# Patient Record
Sex: FEMALE | Race: BLACK OR AFRICAN AMERICAN | NOT HISPANIC OR LATINO | Employment: OTHER | URBAN - METROPOLITAN AREA
[De-identification: names, ages, dates, MRNs, and addresses within clinical notes are randomized per-mention and may not be internally consistent; named-entity substitution may affect disease eponyms.]

---

## 2017-09-28 ENCOUNTER — HOSPITAL ENCOUNTER (EMERGENCY)
Facility: HOSPITAL | Age: 67
Discharge: HOME | End: 2017-09-28
Attending: EMERGENCY MEDICINE
Payer: MEDICARE

## 2017-09-28 VITALS
HEIGHT: 59 IN | RESPIRATION RATE: 12 BRPM | BODY MASS INDEX: 23.79 KG/M2 | SYSTOLIC BLOOD PRESSURE: 188 MMHG | WEIGHT: 118 LBS | DIASTOLIC BLOOD PRESSURE: 76 MMHG | TEMPERATURE: 98 F | OXYGEN SATURATION: 98 % | HEART RATE: 75 BPM

## 2017-09-28 DIAGNOSIS — Z99.2 ESRD (END STAGE RENAL DISEASE) ON DIALYSIS: Primary | ICD-10-CM

## 2017-09-28 DIAGNOSIS — N18.6 ESRD (END STAGE RENAL DISEASE) ON DIALYSIS: Primary | ICD-10-CM

## 2017-09-28 DIAGNOSIS — R07.9 CHEST PAIN: ICD-10-CM

## 2017-09-28 LAB
ALBUMIN SERPL BCP-MCNC: 3.1 G/DL
ALP SERPL-CCNC: 55 U/L
ALT SERPL W/O P-5'-P-CCNC: 5 U/L
ANION GAP SERPL CALC-SCNC: 17 MMOL/L
AST SERPL-CCNC: 10 U/L
BASOPHILS # BLD AUTO: 0.02 K/UL
BASOPHILS NFR BLD: 0.5 %
BILIRUB SERPL-MCNC: 0.9 MG/DL
BNP SERPL-MCNC: >4900 PG/ML
BUN SERPL-MCNC: 23 MG/DL
CALCIUM SERPL-MCNC: 9.6 MG/DL
CHLORIDE SERPL-SCNC: 99 MMOL/L
CK MB SERPL-MCNC: 1.2 NG/ML
CK MB SERPL-RTO: 5 %
CK SERPL-CCNC: 24 U/L
CO2 SERPL-SCNC: 23 MMOL/L
CREAT SERPL-MCNC: 7 MG/DL
DIFFERENTIAL METHOD: ABNORMAL
EOSINOPHIL # BLD AUTO: 0.1 K/UL
EOSINOPHIL NFR BLD: 2 %
ERYTHROCYTE [DISTWIDTH] IN BLOOD BY AUTOMATED COUNT: 16.8 %
EST. GFR  (AFRICAN AMERICAN): 6 ML/MIN/1.73 M^2
EST. GFR  (NON AFRICAN AMERICAN): 6 ML/MIN/1.73 M^2
GLUCOSE SERPL-MCNC: 72 MG/DL
HCT VFR BLD AUTO: 37.1 %
HGB BLD-MCNC: 11.9 G/DL
LYMPHOCYTES # BLD AUTO: 1.6 K/UL
LYMPHOCYTES NFR BLD: 39.3 %
MCH RBC QN AUTO: 27.2 PG
MCHC RBC AUTO-ENTMCNC: 32.1 G/DL
MCV RBC AUTO: 85 FL
MONOCYTES # BLD AUTO: 0.5 K/UL
MONOCYTES NFR BLD: 12.8 %
NEUTROPHILS # BLD AUTO: 1.8 K/UL
NEUTROPHILS NFR BLD: 45.9 %
PLATELET # BLD AUTO: 116 K/UL
PLATELET BLD QL SMEAR: ABNORMAL
PMV BLD AUTO: 10.2 FL
POTASSIUM SERPL-SCNC: 3.7 MMOL/L
PROT SERPL-MCNC: 7.3 G/DL
RBC # BLD AUTO: 4.38 M/UL
SODIUM SERPL-SCNC: 139 MMOL/L
TROPONIN I SERPL DL<=0.01 NG/ML-MCNC: 0.08 NG/ML
WBC # BLD AUTO: 3.99 K/UL

## 2017-09-28 PROCEDURE — 84484 ASSAY OF TROPONIN QUANT: CPT

## 2017-09-28 PROCEDURE — 99284 EMERGENCY DEPT VISIT MOD MDM: CPT | Mod: 25

## 2017-09-28 PROCEDURE — 93005 ELECTROCARDIOGRAM TRACING: CPT

## 2017-09-28 PROCEDURE — 83880 ASSAY OF NATRIURETIC PEPTIDE: CPT

## 2017-09-28 PROCEDURE — 82553 CREATINE MB FRACTION: CPT

## 2017-09-28 PROCEDURE — 85025 COMPLETE CBC W/AUTO DIFF WBC: CPT

## 2017-09-28 PROCEDURE — 80053 COMPREHEN METABOLIC PANEL: CPT

## 2017-09-28 PROCEDURE — 93010 ELECTROCARDIOGRAM REPORT: CPT | Mod: ,,, | Performed by: INTERNAL MEDICINE

## 2017-09-28 RX ORDER — BUTALBITAL, ACETAMINOPHEN AND CAFFEINE 50; 325; 40 MG/1; MG/1; MG/1
1 TABLET ORAL
Status: DISCONTINUED | OUTPATIENT
Start: 2017-09-28 | End: 2017-09-28

## 2017-09-28 RX ORDER — OMEPRAZOLE 20 MG/1
20 CAPSULE, DELAYED RELEASE ORAL DAILY
Qty: 30 CAPSULE | Refills: 11 | Status: SHIPPED | OUTPATIENT
Start: 2017-09-28 | End: 2018-06-04

## 2017-09-28 RX ORDER — PROMETHAZINE HYDROCHLORIDE 25 MG/1
25 TABLET ORAL EVERY 6 HOURS PRN
Qty: 15 TABLET | Refills: 0 | Status: ON HOLD | OUTPATIENT
Start: 2017-09-28 | End: 2018-07-18 | Stop reason: HOSPADM

## 2017-09-28 NOTE — ED PROVIDER NOTES
SCRIBE #1 NOTE: I, Yisel Blackman, am scribing for, and in the presence of, Dieudonne Cardenas MD. I have scribed the entire note.      History      Chief Complaint   Patient presents with    Chest Pain     reports pain for a week, radiates to L shoulder/neck. Reports mild SOB       Review of patient's allergies indicates:   Allergen Reactions    Tylenol [acetaminophen] Other (See Comments)     Headache        HPI   HPI    2017, 10:35 AM   History obtained from the patient      History of Present Illness: Talia Mcfarland is a 67 y.o. female patient who presents to the Emergency Department for acute substernal CP which onset gradually about 1 week ago. Symptoms are constant and moderate in severity.  No mitigating or exacerbating factors reported. Associated sxs include pain that radiates to the L shoulder and neck, SOB, and mild n/v. Patient denies any fever, chills, diaphoresis, palpitations, extremity weakness/numbness, leg pain/swelling, dizziness, cough, and all other sxs at this time. No prior Tx reported. No further complaints or concerns at this time.         Arrival mode: Personal vehicle      PCP: Tank Castro MD       Past Medical History:  Past Medical History:   Diagnosis Date    Anemia     CHF (congestive heart failure)     Diabetes mellitus     DVT (deep venous thrombosis)     Left leg    ESRD on hemodialysis 2 years     Gastroparesis     Hx of diabetic gastroparesis 3/18/2015    Hypertension     Type II or unspecified type diabetes mellitus with renal manifestations, not stated as uncontrolled 30 years     Urinary tract infection due to extended-spectrum beta lactamase (ESBL)-producing Klebsiella        Past Surgical History:  Past Surgical History:   Procedure Laterality Date     SECTION      CHOLECYSTECTOMY      KUSHAL Bilateral     LBP severe with multiple KUSHAL     ROTATOR CUFF REPAIR      UMBILICAL HERNIA REPAIR      VEIN BYPASS SURGERY Left          Family  History:  Family History   Problem Relation Age of Onset    Kidney disease Neg Hx        Social History:  Social History     Social History Main Topics    Smoking status: Never Smoker    Smokeless tobacco: Never Used    Alcohol use No    Drug use: No    Sexual activity: Not on file       ROS   Review of Systems   Constitutional: Negative for chills, diaphoresis and fever.   HENT: Negative for sore throat.    Respiratory: Positive for shortness of breath. Negative for cough.    Cardiovascular: Positive for chest pain. Negative for palpitations and leg swelling.   Gastrointestinal: Positive for nausea and vomiting.   Genitourinary: Negative for dysuria.   Musculoskeletal: Negative for back pain.        - leg pain    Skin: Negative for rash.   Neurological: Negative for dizziness, weakness and numbness.   Hematological: Does not bruise/bleed easily.       Physical Exam      Initial Vitals [09/28/17 1034]   BP Pulse Resp Temp SpO2   (!) 194/78 78 20 97.9 °F (36.6 °C) 100 %      MAP       116.67          Physical Exam  Nursing Notes and Vital Signs Reviewed.  Constitutional: Patient is in no apparent distress. Well-developed and well-nourished.  Head: Atraumatic. Normocephalic.  Eyes: PERRL. EOM intact. Conjunctivae are not pale. No scleral icterus.  ENT: Mucous membranes are moist. Oropharynx is clear and symmetri.    Neck: Supple. Full ROM. No lymphadenopathy.  Cardiovascular: Regular rate. Regular rhythm. No murmurs, rubs, or gallops. Distal pulses are 2+ and symmetric.  Pulmonary/Chest: No respiratory distress. Clear to auscultation bilaterally. No wheezing, rales, or rhonchi.  Abdominal: Soft and non-distended.  There is no tenderness.  No rebound, guarding, or rigidity. Good bowel sounds.  Genitourinary: No CVA tenderness  Musculoskeletal: Moves all extremities. No obvious deformities. No edema. No calf tenderness.  Skin: Warm and dry.  Neurological:  Alert, awake, and appropriate.  Normal speech.  No acute  "focal neurological deficits are appreciated.  Psychiatric: Normal affect. Good eye contact. Appropriate in content.    ED Course    Procedures  ED Vital Signs:  Vitals:    09/28/17 1034 09/28/17 1041 09/28/17 1132 09/28/17 1302   BP: (!) 194/78  (!) 196/76 (!) 188/76   Pulse: 78 77 74 75   Resp: 20  (!) 23 12   Temp: 97.9 °F (36.6 °C)      TempSrc: Oral      SpO2: 100%  97% 98%   Weight: 53.5 kg (118 lb)      Height: 4' 11" (1.499 m)          Abnormal Lab Results:  Labs Reviewed   CBC W/ AUTO DIFFERENTIAL - Abnormal; Notable for the following:        Result Value    Hemoglobin 11.9 (*)     RDW 16.8 (*)     Platelets 116 (*)     Platelet Estimate Decreased (*)     All other components within normal limits   B-TYPE NATRIURETIC PEPTIDE - Abnormal; Notable for the following:     BNP >4,900 (*)     All other components within normal limits   TROPONIN I - Abnormal; Notable for the following:     Troponin I 0.076 (*)     All other components within normal limits   COMPREHENSIVE METABOLIC PANEL - Abnormal; Notable for the following:     Creatinine 7.0 (*)     Albumin 3.1 (*)     ALT 5 (*)     Anion Gap 17 (*)     eGFR if  6 (*)     eGFR if non  6 (*)     All other components within normal limits   CK-MB   URINALYSIS        All Lab Results:  Results for orders placed or performed during the hospital encounter of 09/28/17   CBC auto differential   Result Value Ref Range    WBC 3.99 3.90 - 12.70 K/uL    RBC 4.38 4.00 - 5.40 M/uL    Hemoglobin 11.9 (L) 12.0 - 16.0 g/dL    Hematocrit 37.1 37.0 - 48.5 %    MCV 85 82 - 98 fL    MCH 27.2 27.0 - 31.0 pg    MCHC 32.1 32.0 - 36.0 g/dL    RDW 16.8 (H) 11.5 - 14.5 %    Platelets 116 (L) 150 - 350 K/uL    MPV 10.2 9.2 - 12.9 fL    Gran # 1.8 1.8 - 7.7 K/uL    Lymph # 1.6 1.0 - 4.8 K/uL    Mono # 0.5 0.3 - 1.0 K/uL    Eos # 0.1 0.0 - 0.5 K/uL    Baso # 0.02 0.00 - 0.20 K/uL    Gran% 45.9 38.0 - 73.0 %    Lymph% 39.3 18.0 - 48.0 %    Mono% 12.8 4.0 - 15.0 % "    Eosinophil% 2.0 0.0 - 8.0 %    Basophil% 0.5 0.0 - 1.9 %    Platelet Estimate Decreased (A)     Differential Method Automated    Brain natriuretic peptide   Result Value Ref Range    BNP >4,900 (H) 0 - 99 pg/mL   CK-MB   Result Value Ref Range    CPK 24 20 - 180 U/L    CPK MB 1.2 0.1 - 6.5 ng/mL    MB% 5.0 0.0 - 5.0 %   Troponin I   Result Value Ref Range    Troponin I 0.076 (H) 0.000 - 0.026 ng/mL   Comprehensive metabolic panel   Result Value Ref Range    Sodium 139 136 - 145 mmol/L    Potassium 3.7 3.5 - 5.1 mmol/L    Chloride 99 95 - 110 mmol/L    CO2 23 23 - 29 mmol/L    Glucose 72 70 - 110 mg/dL    BUN, Bld 23 8 - 23 mg/dL    Creatinine 7.0 (H) 0.5 - 1.4 mg/dL    Calcium 9.6 8.7 - 10.5 mg/dL    Total Protein 7.3 6.0 - 8.4 g/dL    Albumin 3.1 (L) 3.5 - 5.2 g/dL    Total Bilirubin 0.9 0.1 - 1.0 mg/dL    Alkaline Phosphatase 55 55 - 135 U/L    AST 10 10 - 40 U/L    ALT 5 (L) 10 - 44 U/L    Anion Gap 17 (H) 8 - 16 mmol/L    eGFR if African American 6 (A) >60 mL/min/1.73 m^2    eGFR if non African American 6 (A) >60 mL/min/1.73 m^2         Imaging Results:  Imaging Results          X-Ray Chest AP Portable (Final result)  Result time 09/28/17 11:43:49    Final result by RAISSA Gaviria Sr., MD (09/28/17 11:43:49)                 Impression:        1. The findings are characteristic of mild cardiomegaly with mild bilateral pulmonary edema.  2. There are surgical clips projected over the medial aspect of the left upper extremity.  3. There is a mild amount of dextroconvex curvature of the thoracic spine.        Electronically signed by: RAISSA GAVIRIA MD  Date:     09/28/17  Time:    11:43              Narrative:    One-view chest x-ray    Clinical History: Chest pain    Finding: Comparison was made to a prior examination performed on 12/22/2016. There is myocardium megaly. There is mild prominence of the central pulmonary vascularity bilaterally. There is a mild amount of interstitial and alveolar opacities seen in  both lungs with curly B lines visualized bilaterally. There is no pneumothorax.  The costophrenic angles are sharp. There is a mild amount of dextroconvex curvature of the thoracic spine. There are surgical clips projected over the medial aspect of the left upper extremity.                                    The EKG was ordered, reviewed, and independently interpreted by the ED provider.  Interpretation time: 1041  Rate: 77 BPM  Rhythm: normal sinus rhythm  Interpretation: Possible L latrial englargement. L anterior fascicular block. L ventricular hypertrophy with QRS widening and repolarization abnormality. Prolonged QT. No STEMI.      The Emergency Provider reviewed the vital signs and test results, which are outlined above.    ED Discussion   Pre-hypertension/Hypertension: The pt has been informed that they may have pre-hypertension or hypertension based on a blood pressure reading in the ED. I recommend that the pt call the PCP listed on their discharge instructions or a physician of their choice this week to arrange f/u for further evaluation of possible pre-hypertension or hypertension.     12:58 PM: Dr. Ronald MD discussed the pt's case with Dr. Maguire (cardiology) who recommends d/c pt to home and pt should attend routine dialysis tomorrow as scheduled.      12:59 PM:  Discussed with pt all pertinent ED information and results. Discussed pt dx  and plan of tx. Gave pt all f/u and return to the ED instructions. All questions and concerns were addressed at this time. Pt expresses understanding of information and instructions, and is comfortable with plan to discharge. Pt is stable for discharge.    I have discussed with patient and/or family/caretaker chest pain precautions, specifically to return for worsening chest pain, shortness of breath, fever, or any concern.  I have low suspicion for cardiopulmonary, vascular, infectious, respiratory, or other emergent medical condition based on my evaluation in the  ED.      ED Medication(s):  Medications - No data to display    Discharge Medication List as of 9/28/2017  1:08 PM          Follow-up Information     Tank Castro MD In 2 days.    Specialty:  Internal Medicine  Contact information:  [address] [facility] [address] [phone]                     Medical Decision Making    Medical Decision Making:   Clinical Tests:   Lab Tests: Reviewed and Ordered  Radiological Study: Reviewed and Ordered  Medical Tests: Reviewed and Ordered           Scribe Attestation:   Scribe #1: I performed the above scribed service and the documentation accurately describes the services I performed. I attest to the accuracy of the note.    Attending:   Physician Attestation Statement for Scribe #1: I, Dieudonne Cardenas MD, personally performed the services described in this documentation, as scribed by Yisel Blackman, in my presence, and it is both accurate and complete.          Clinical Impression       ICD-10-CM ICD-9-CM   1. ESRD (end stage renal disease) on dialysis N18.6 585.6    Z99.2 V45.11   2. Chest pain R07.9 786.50       Disposition:   Disposition: Discharged  Condition: Stable         Dieudonne Cardenas MD  09/28/17 5406

## 2017-10-02 ENCOUNTER — HOSPITAL ENCOUNTER (EMERGENCY)
Facility: HOSPITAL | Age: 67
Discharge: HOME | End: 2017-10-02
Attending: EMERGENCY MEDICINE
Payer: MEDICARE

## 2017-10-02 VITALS
TEMPERATURE: 98 F | HEIGHT: 59 IN | WEIGHT: 114.63 LBS | BODY MASS INDEX: 23.11 KG/M2 | HEART RATE: 75 BPM | OXYGEN SATURATION: 96 % | SYSTOLIC BLOOD PRESSURE: 138 MMHG | DIASTOLIC BLOOD PRESSURE: 49 MMHG | RESPIRATION RATE: 11 BRPM

## 2017-10-02 DIAGNOSIS — R52 GENERALIZED PAIN: Primary | ICD-10-CM

## 2017-10-02 LAB
ALBUMIN SERPL BCP-MCNC: 3 G/DL
ALP SERPL-CCNC: 57 U/L
ALT SERPL W/O P-5'-P-CCNC: <5 U/L
ANION GAP SERPL CALC-SCNC: 16 MMOL/L
ANISOCYTOSIS BLD QL SMEAR: SLIGHT
AST SERPL-CCNC: 10 U/L
BASOPHILS # BLD AUTO: 0.03 K/UL
BASOPHILS NFR BLD: 0.8 %
BILIRUB SERPL-MCNC: 0.8 MG/DL
BUN SERPL-MCNC: 16 MG/DL
CALCIUM SERPL-MCNC: 9.1 MG/DL
CHLORIDE SERPL-SCNC: 98 MMOL/L
CO2 SERPL-SCNC: 25 MMOL/L
CREAT SERPL-MCNC: 6.3 MG/DL
DACRYOCYTES BLD QL SMEAR: ABNORMAL
DIFFERENTIAL METHOD: ABNORMAL
EOSINOPHIL # BLD AUTO: 0.2 K/UL
EOSINOPHIL NFR BLD: 4.3 %
ERYTHROCYTE [DISTWIDTH] IN BLOOD BY AUTOMATED COUNT: 16.6 %
EST. GFR  (AFRICAN AMERICAN): 7 ML/MIN/1.73 M^2
EST. GFR  (NON AFRICAN AMERICAN): 6 ML/MIN/1.73 M^2
GLUCOSE SERPL-MCNC: 73 MG/DL
HCT VFR BLD AUTO: 34.8 %
HGB BLD-MCNC: 11.3 G/DL
HYPOCHROMIA BLD QL SMEAR: ABNORMAL
LYMPHOCYTES # BLD AUTO: 1.8 K/UL
LYMPHOCYTES NFR BLD: 45.1 %
MAGNESIUM SERPL-MCNC: 2 MG/DL
MCH RBC QN AUTO: 27.5 PG
MCHC RBC AUTO-ENTMCNC: 32.5 G/DL
MCV RBC AUTO: 85 FL
MONOCYTES # BLD AUTO: 0.5 K/UL
MONOCYTES NFR BLD: 12.2 %
NEUTROPHILS # BLD AUTO: 1.5 K/UL
NEUTROPHILS NFR BLD: 37.9 %
OVALOCYTES BLD QL SMEAR: ABNORMAL
PLATELET # BLD AUTO: 118 K/UL
PLATELET BLD QL SMEAR: ABNORMAL
PMV BLD AUTO: 9.6 FL
POIKILOCYTOSIS BLD QL SMEAR: SLIGHT
POTASSIUM SERPL-SCNC: 3.4 MMOL/L
PROT SERPL-MCNC: 7.2 G/DL
RBC # BLD AUTO: 4.11 M/UL
SODIUM SERPL-SCNC: 139 MMOL/L
SPHEROCYTES BLD QL SMEAR: ABNORMAL
STOMATOCYTES BLD QL SMEAR: PRESENT
TARGETS BLD QL SMEAR: ABNORMAL
TROPONIN I SERPL DL<=0.01 NG/ML-MCNC: 0.08 NG/ML
WBC # BLD AUTO: 3.95 K/UL

## 2017-10-02 PROCEDURE — 93005 ELECTROCARDIOGRAM TRACING: CPT

## 2017-10-02 PROCEDURE — 80053 COMPREHEN METABOLIC PANEL: CPT

## 2017-10-02 PROCEDURE — 84484 ASSAY OF TROPONIN QUANT: CPT

## 2017-10-02 PROCEDURE — 63600175 PHARM REV CODE 636 W HCPCS: Performed by: EMERGENCY MEDICINE

## 2017-10-02 PROCEDURE — 99284 EMERGENCY DEPT VISIT MOD MDM: CPT | Mod: 25

## 2017-10-02 PROCEDURE — 85025 COMPLETE CBC W/AUTO DIFF WBC: CPT

## 2017-10-02 PROCEDURE — 83735 ASSAY OF MAGNESIUM: CPT

## 2017-10-02 PROCEDURE — 96374 THER/PROPH/DIAG INJ IV PUSH: CPT

## 2017-10-02 PROCEDURE — 93010 ELECTROCARDIOGRAM REPORT: CPT | Mod: ,,, | Performed by: INTERNAL MEDICINE

## 2017-10-02 PROCEDURE — 96375 TX/PRO/DX INJ NEW DRUG ADDON: CPT

## 2017-10-02 RX ORDER — ONDANSETRON 2 MG/ML
4 INJECTION INTRAMUSCULAR; INTRAVENOUS
Status: COMPLETED | OUTPATIENT
Start: 2017-10-02 | End: 2017-10-02

## 2017-10-02 RX ORDER — MORPHINE SULFATE 4 MG/ML
4 INJECTION, SOLUTION INTRAMUSCULAR; INTRAVENOUS
Status: COMPLETED | OUTPATIENT
Start: 2017-10-02 | End: 2017-10-02

## 2017-10-02 RX ADMIN — MORPHINE SULFATE 4 MG: 4 INJECTION, SOLUTION INTRAMUSCULAR; INTRAVENOUS at 07:10

## 2017-10-02 RX ADMIN — ONDANSETRON 4 MG: 2 INJECTION INTRAMUSCULAR; INTRAVENOUS at 07:10

## 2017-10-02 NOTE — ED PROVIDER NOTES
"SCRIBE #1 NOTE: I, Randy Arechiga, am scribing for, and in the presence of, Jenniffer Zavala MD. I have scribed the entire note.     SCRIBE #2 NOTE: I, Benedict Herrera, am scribing for, and in the presence of,  Asuncion Mack MD. I have scribed the remaining portions of the note not scribed by Scribe #1.     History      Chief Complaint   Patient presents with    Pain     "I'm hurting all over"; pt dialysis pt- she did have dialysis today       Review of patient's allergies indicates:   Allergen Reactions    Tylenol [acetaminophen] Other (See Comments)     Headache        HPI   HPI    10/2/2017, 6:42 PM   History obtained from the patient      History of Present Illness: Talia Mcfarland is a 67 y.o. female patient with a PMHx of ESRD, CHF, DVT, who presents to the Emergency Department for diffuse body aches which onset gradually today. Sxs are constant and moderate in severity.  Pt notes pain to chest and back. Pt reports her sxs are chronic.There are no mitigating or exacerbating factors noted.  Pt denies any fever, chills, leg swelling, calf pain, numbness, HA, LOC, SOB, cough, and all other sxs at this time. Pt had dialysis today. No further complaints or concerns at this time.       Arrival mode: Personal vehicle    PCP: Tank Castro MD       Past Medical History:  Past Medical History:   Diagnosis Date    Anemia     CHF (congestive heart failure)     Diabetes mellitus     DVT (deep venous thrombosis)     Left leg    ESRD on hemodialysis 2 years     Gastroparesis     Hx of diabetic gastroparesis 3/18/2015    Hypertension     Type II or unspecified type diabetes mellitus with renal manifestations, not stated as uncontrolled 30 years     Urinary tract infection due to extended-spectrum beta lactamase (ESBL)-producing Klebsiella        Past Surgical History:  Past Surgical History:   Procedure Laterality Date     SECTION      CHOLECYSTECTOMY      KUSHAL Bilateral     LBP severe with " multiple KUSHAL     ROTATOR CUFF REPAIR      UMBILICAL HERNIA REPAIR      VEIN BYPASS SURGERY Left          Family History:  Family History   Problem Relation Age of Onset    Kidney disease Neg Hx        Social History:  Social History     Social History Main Topics    Smoking status: Never Smoker    Smokeless tobacco: Never Used    Alcohol use No    Drug use: No    Sexual activity: unknown       ROS   Review of Systems   Constitutional: Negative for fever.   HENT: Negative for sore throat.    Respiratory: Negative for shortness of breath.    Cardiovascular: Positive for chest pain.   Gastrointestinal: Negative for abdominal pain, constipation, diarrhea, nausea and vomiting.   Genitourinary: Negative for dysuria.   Musculoskeletal: Positive for arthralgias and back pain.   Skin: Negative for rash.   Neurological: Negative for weakness.   Hematological: Does not bruise/bleed easily.       Physical Exam      Initial Vitals [10/02/17 1752]   BP Pulse Resp Temp SpO2   (!) 189/99 81 18 98 °F (36.7 °C) 98 %      MAP       129          Physical Exam  Nursing Notes and Vital Signs Reviewed.  Constitutional: Patient is in no acute distress. Well-developed and well-nourished.  Head: Atraumatic. Normocephalic.  Eyes: PERRL. EOM intact. Conjunctivae are not pale. No scleral icterus.  ENT: Mucous membranes are moist. Oropharynx is clear and symmetric.    Neck: Supple. Full ROM. No lymphadenopathy.  Cardiovascular: Regular rate. Regular rhythm. No murmurs, rubs, or gallops. Distal pulses are 2+ and symmetric.  Pulmonary/Chest: No respiratory distress. Clear to auscultation bilaterally. No wheezing, rales, or rhonchi.  Abdominal: Soft and non-distended.  There is no tenderness.  No rebound, guarding, or rigidity. Good bowel sounds.  Genitourinary: No CVA tenderness  Musculoskeletal: Moves all extremities. No obvious deformities. No edema. No calf tenderness.  Skin: Warm and dry. Shunt to LUE with positive thrill.  "  Neurological:  Alert, awake, and appropriate.  Normal speech.  No acute focal neurological deficits are appreciated.  Psychiatric: Normal affect. Good eye contact. Appropriate in content.    ED Course    Procedures  ED Vital Signs:  Vitals:    10/02/17 1752 10/02/17 1854 10/02/17 2000 10/02/17 2100   BP: (!) 189/99  (!) 142/56 (!) 138/49   Pulse: 81 78 78 75   Resp: 18  15 11   Temp: 98 °F (36.7 °C)      TempSrc: Oral      SpO2: 98%  98% 96%   Weight: 52 kg (114 lb 10.2 oz)      Height: 4' 11" (1.499 m)          Abnormal Lab Results:  Labs Reviewed   COMPREHENSIVE METABOLIC PANEL - Abnormal; Notable for the following:        Result Value    Potassium 3.4 (*)     Creatinine 6.3 (*)     Albumin 3.0 (*)     ALT <5 (*)     eGFR if  7 (*)     eGFR if non  6 (*)     All other components within normal limits   TROPONIN I - Abnormal; Notable for the following:     Troponin I 0.082 (*)     All other components within normal limits   CBC W/ AUTO DIFFERENTIAL - Abnormal; Notable for the following:     Hemoglobin 11.3 (*)     Hematocrit 34.8 (*)     RDW 16.6 (*)     Platelets 118 (*)     Gran # 1.5 (*)     Gran% 37.9 (*)     Platelet Estimate Decreased (*)     All other components within normal limits   MAGNESIUM        All Lab Results:  Results for orders placed or performed during the hospital encounter of 10/02/17   Comprehensive metabolic panel   Result Value Ref Range    Sodium 139 136 - 145 mmol/L    Potassium 3.4 (L) 3.5 - 5.1 mmol/L    Chloride 98 95 - 110 mmol/L    CO2 25 23 - 29 mmol/L    Glucose 73 70 - 110 mg/dL    BUN, Bld 16 8 - 23 mg/dL    Creatinine 6.3 (H) 0.5 - 1.4 mg/dL    Calcium 9.1 8.7 - 10.5 mg/dL    Total Protein 7.2 6.0 - 8.4 g/dL    Albumin 3.0 (L) 3.5 - 5.2 g/dL    Total Bilirubin 0.8 0.1 - 1.0 mg/dL    Alkaline Phosphatase 57 55 - 135 U/L    AST 10 10 - 40 U/L    ALT <5 (L) 10 - 44 U/L    Anion Gap 16 8 - 16 mmol/L    eGFR if African American 7 (A) >60 mL/min/1.73 " m^2    eGFR if non African American 6 (A) >60 mL/min/1.73 m^2   Troponin I #1   Result Value Ref Range    Troponin I 0.082 (H) 0.000 - 0.026 ng/mL   Magnesium   Result Value Ref Range    Magnesium 2.0 1.6 - 2.6 mg/dL   CBC auto differential   Result Value Ref Range    WBC 3.95 3.90 - 12.70 K/uL    RBC 4.11 4.00 - 5.40 M/uL    Hemoglobin 11.3 (L) 12.0 - 16.0 g/dL    Hematocrit 34.8 (L) 37.0 - 48.5 %    MCV 85 82 - 98 fL    MCH 27.5 27.0 - 31.0 pg    MCHC 32.5 32.0 - 36.0 g/dL    RDW 16.6 (H) 11.5 - 14.5 %    Platelets 118 (L) 150 - 350 K/uL    MPV 9.6 9.2 - 12.9 fL    Gran # 1.5 (L) 1.8 - 7.7 K/uL    Lymph # 1.8 1.0 - 4.8 K/uL    Mono # 0.5 0.3 - 1.0 K/uL    Eos # 0.2 0.0 - 0.5 K/uL    Baso # 0.03 0.00 - 0.20 K/uL    Gran% 37.9 (L) 38.0 - 73.0 %    Lymph% 45.1 18.0 - 48.0 %    Mono% 12.2 4.0 - 15.0 %    Eosinophil% 4.3 0.0 - 8.0 %    Basophil% 0.8 0.0 - 1.9 %    Platelet Estimate Decreased (A)     Aniso Slight     Poik Slight     Hypo Occasional     Ovalocytes Occasional     Target Cells Occasional     Tear Drop Cells Occasional     Stomatocytes Present     Spherocytes Occasional     Differential Method Automated          Imaging Results:  Imaging Results          X-Ray Chest AP Portable (Final result)  Result time 10/02/17 19:14:07    Final result by RAISSA Gaviria Sr., MD (10/02/17 19:14:07)                 Impression:        1. The findings are characteristic of mild cardiomegaly with mild bilateral pulmonary edema.  2. There are surgical clips projected over the medial aspect of the left upper extremity.        Electronically signed by: RAISSA GAVIRIA MD  Date:     10/02/17  Time:    19:14              Narrative:    One-view chest x-ray    Clinical History: Chest pain    Finding: Comparison was made to a prior examination performed on 9/28/2017. There is mild cardiomegaly. There is a mild amount of interstitial and alveolar opacities seen in both lungs. There is a mild amount of atherosclerosis. There is no  pneumothorax.  The costophrenic angles are sharp. There are surgical clips projected over the medial aspect of the left upper extremity.                                    The EKG was ordered, reviewed, and independently interpreted by the ED provider.  Interpretation time: 18:35  Rate: 77 BPM  Rhythm: normal sinus rhythm  Interpretation: Possible LAE. LAFB. LVH. No STEMI.      The Emergency Provider reviewed the vital signs and test results, which are outlined above.    ED Discussion     7:57 PM: Dr. Zavala transfers care of pt to Dr. Mack, pending lab results.    8:51 PM: Reassessed pt at this time.  Pt is awake, alert, and in no distress. Pt has hydrocodone for pain at home. Discussed with pt all pertinent ED information and results. Discussed pt dx and plan of tx. Gave pt all f/u and return to the ED instructions. All questions and concerns were addressed at this time. Pt expresses understanding of information and instructions, and is comfortable with plan to discharge. Pt is stable for discharge.        ED Medication(s):  Medications   morphine injection 4 mg (4 mg Intravenous Given 10/2/17 1949)   ondansetron injection 4 mg (4 mg Intravenous Given 10/2/17 1948)       Discharge Medication List as of 10/2/2017  8:53 PM          Follow-up Information     Tank Castro MD In 1 day.    Specialty:  Internal Medicine  Contact information:  [address] [facility] [address] [phone]             [facility] - .    Specialty:  Emergency Medicine  Why:  As needed, If symptoms worsen  Contact information:  [address] [facility] [address] [phone]                   Medical Decision Making    Medical Decision Making:   Clinical Tests:   Lab Tests: Ordered and Reviewed  Radiological Study: Ordered and Reviewed  Medical Tests: Ordered and Reviewed           Scribe Attestation:   Scribe #1: I performed the above scribed service and the documentation accurately  describes the services I performed. I attest to the accuracy of the note.    Attending:   Physician Attestation Statement for Scribe #1: I, Jenniffer Zavala MD, personally performed the services described in this documentation, as scribed by Randy Arechiga, in my presence, and it is both accurate and complete.       Scribe Attestation:   Scribe #2: I performed the above scribed service and the documentation accurately describes the services I performed. I attest to the accuracy of the note.    Attending Attestation:           Physician Attestation for Scribe:    Physician Attestation Statement for Scribe #2: I, Asuncion Mack MD, reviewed documentation, as scribed by Benedict Herrera in my presence, and it is both accurate and complete. I also acknowledge and confirm the content of the note done by Scribe #1.          Clinical Impression       ICD-10-CM ICD-9-CM   1. Generalized pain R52 780.96       Disposition:   Disposition: Discharged  Condition: Stable         Asuncion Mack MD  10/03/17 0542

## 2017-12-05 ENCOUNTER — OFFICE VISIT (OUTPATIENT)
Dept: OBSTETRICS AND GYNECOLOGY | Facility: CLINIC | Age: 67
End: 2017-12-05
Payer: MEDICARE

## 2017-12-05 VITALS
BODY MASS INDEX: 24.8 KG/M2 | SYSTOLIC BLOOD PRESSURE: 130 MMHG | HEIGHT: 59 IN | WEIGHT: 123 LBS | DIASTOLIC BLOOD PRESSURE: 72 MMHG

## 2017-12-05 DIAGNOSIS — Z12.31 ENCOUNTER FOR SCREENING MAMMOGRAM FOR MALIGNANT NEOPLASM OF BREAST: ICD-10-CM

## 2017-12-05 DIAGNOSIS — Z00.00 PREVENTATIVE HEALTH CARE: ICD-10-CM

## 2017-12-05 DIAGNOSIS — Z78.0 POSTMENOPAUSAL: ICD-10-CM

## 2017-12-05 DIAGNOSIS — Z01.419 ENCOUNTER FOR WELL WOMAN EXAM WITH ROUTINE GYNECOLOGICAL EXAM: Primary | ICD-10-CM

## 2017-12-05 PROCEDURE — G0101 CA SCREEN;PELVIC/BREAST EXAM: HCPCS | Mod: S$PBB,,, | Performed by: NURSE PRACTITIONER

## 2017-12-05 PROCEDURE — 88175 CYTOPATH C/V AUTO FLUID REDO: CPT

## 2017-12-05 PROCEDURE — 99213 OFFICE O/P EST LOW 20 MIN: CPT | Mod: PBBFAC | Performed by: NURSE PRACTITIONER

## 2017-12-05 PROCEDURE — 99999 PR PBB SHADOW E&M-EST. PATIENT-LVL III: CPT | Mod: PBBFAC,,, | Performed by: NURSE PRACTITIONER

## 2017-12-05 RX ORDER — AMLODIPINE BESYLATE 5 MG/1
TABLET ORAL
Status: ON HOLD | COMMUNITY
Start: 2017-10-27 | End: 2018-07-18 | Stop reason: HOSPADM

## 2017-12-05 RX ORDER — ERGOCALCIFEROL 1.25 MG/1
50000 CAPSULE ORAL
COMMUNITY
Start: 2017-03-22 | End: 2018-03-22

## 2017-12-05 NOTE — PROGRESS NOTES
"CC: Well woman exam    Talia Mcfarland is a 67 y.o. female is presenting for WWE. , no AUB and is not sexually active. Last pap exam and mammogram were over ten years ago.Never had a bone scan. Colonoscopy 2 years ago, normal.On HD.     Past Medical History:   Diagnosis Date    Anemia     CHF (congestive heart failure)     Diabetes mellitus     DVT (deep venous thrombosis)     Left leg    ESRD on hemodialysis 2 years     Gastroparesis     Hx of diabetic gastroparesis 3/18/2015    Hypertension     Type II or unspecified type diabetes mellitus with renal manifestations, not stated as uncontrolled(250.40) 30 years     Urinary tract infection due to extended-spectrum beta lactamase (ESBL)-producing Klebsiella      Past Surgical History:   Procedure Laterality Date     SECTION      CHOLECYSTECTOMY      KUSHAL Bilateral     LBP severe with multiple KUSHAL     ROTATOR CUFF REPAIR      UMBILICAL HERNIA REPAIR      VEIN BYPASS SURGERY Left      Social History     Social History    Marital status:      Spouse name: N/A    Number of children: N/A    Years of education: N/A     Occupational History    Not on file.     Social History Main Topics    Smoking status: Never Smoker    Smokeless tobacco: Never Used    Alcohol use No    Drug use: No    Sexual activity: Not on file     Other Topics Concern    Not on file     Social History Narrative    No narrative on file     Family History   Problem Relation Age of Onset    Kidney disease Neg Hx      OB History     No data available          /72   Ht 4' 11" (1.499 m)   Wt 55.8 kg (123 lb)   LMP  (LMP Unknown)   BMI 24.84 kg/m²       ROS:  GENERAL: Denies weight gain or weight loss. Feeling well overall.   SKIN: Denies rash or lesions.   HEAD: Denies head injury or headache.   NODES: Denies enlarged lymph nodes.   CHEST: Denies chest pain or shortness of breath.   CARDIOVASCULAR: Denies palpitations or left sided chest pain. "   ABDOMEN: No abdominal pain, constipation, diarrhea, nausea, vomiting or rectal bleeding.   URINARY: No frequency, dysuria, hematuria, or burning on urination.  REPRODUCTIVE: See HPI.   BREASTS: The patient performs breast self-examination and denies pain, lumps, or nipple discharge.   HEMATOLOGIC: No easy bruisability or excessive bleeding.   MUSCULOSKELETAL: Wheelchair for ambulation.  NEUROLOGIC: Denies syncope or weakness.   PSYCHIATRIC: Denies depression, anxiety or mood swings.    PHYSICAL EXAM:  APPEARANCE: Older AA female, in no acute distress.  AFFECT: WNL, alert and oriented x 3  SKIN: No acne or hirsutism  NECK: Neck symmetric without masses or thyromegaly  NODES: No inguinal, cervical, axillary, or femoral lymph node enlargement  CHEST: Good respiratory effect  ABDOMEN: Soft.  No tenderness or masses.  No hepatosplenomegaly.  No hernias.  BREASTS: Symmetrical, no skin changes or visible lesions.  No palpable masses, nipple discharge bilaterally.  PELVIC: Normal external genitalia without lesions.  Normal hair distribution.  Adequate perineal body, normal urethral meatus.  Vagina atrophy.  Cervix pink, without lesions, discharge or tenderness.  No significant cystocele or rectocele.  Bimanual exam shows uterus to be normal size, regular, mobile and nontender.  Adnexa without masses or tenderness.    EXTREMITIES: No edema.    1. Encounter for well woman exam with routine gynecological exam  Mammo Digital Screening Bilat with CAD    DXA Bone Density Spine And Hip    Liquid-based pap smear, screening   2. Preventative health care  Mammo Digital Screening Bilat with CAD    DXA Bone Density Spine And Hip    Liquid-based pap smear, screening   3. Postmenopausal  Mammo Digital Screening Bilat with CAD    DXA Bone Density Spine And Hip    Liquid-based pap smear, screening   4. Encounter for screening mammogram for malignant neoplasm of breast   Mammo Digital Screening Bilat with CAD    PLAN:  Dexa bone scan  Pap  exam  Mammogram       Patient was counseled today on A.C.S. Pap guidelines and recommendations for yearly pelvic exams, mammograms and monthly self breast exams; to see her PCP for other health maintenance.

## 2017-12-09 NOTE — PROGRESS NOTES
Please call patient and inform  Her that pap exam results were normal. She does not need any more pap exams.

## 2017-12-28 ENCOUNTER — TELEPHONE (OUTPATIENT)
Dept: OBSTETRICS AND GYNECOLOGY | Facility: CLINIC | Age: 67
End: 2017-12-28

## 2017-12-28 ENCOUNTER — APPOINTMENT (OUTPATIENT)
Dept: RADIOLOGY | Facility: CLINIC | Age: 67
End: 2017-12-28
Attending: NURSE PRACTITIONER
Payer: MEDICARE

## 2017-12-28 DIAGNOSIS — Z01.419 ENCOUNTER FOR WELL WOMAN EXAM WITH ROUTINE GYNECOLOGICAL EXAM: ICD-10-CM

## 2017-12-28 DIAGNOSIS — Z00.00 PREVENTATIVE HEALTH CARE: ICD-10-CM

## 2017-12-28 DIAGNOSIS — Z78.0 POSTMENOPAUSAL: ICD-10-CM

## 2017-12-28 PROCEDURE — 77080 DXA BONE DENSITY AXIAL: CPT | Mod: TC

## 2017-12-28 PROCEDURE — 77080 DXA BONE DENSITY AXIAL: CPT | Mod: 26,,, | Performed by: RADIOLOGY

## 2017-12-28 RX ORDER — ALENDRONATE SODIUM 70 MG/1
70 TABLET ORAL
Qty: 4 TABLET | Refills: 11 | Status: SHIPPED | OUTPATIENT
Start: 2017-12-28 | End: 2017-12-28

## 2017-12-28 NOTE — PROGRESS NOTES
Please call patient and inform  Her that bone scan indicated osteoporosis. She is remain on calcium supplements, increase weight bearing exercises , like walking, and start weekly Fosamax.

## 2017-12-28 NOTE — TELEPHONE ENCOUNTER
Spoke with pt DEXA results given. Informed pt to start weight bearing exercises. Patient verbalized understanding  Renee Cheng will reassess Fosamax instructions since pt is unable to drink water per Nephrology provider.

## 2017-12-28 NOTE — TELEPHONE ENCOUNTER
----- Message from Renee Cheng NP sent at 12/28/2017  1:07 PM CST -----  Please call patient and inform  Her that bone scan indicated osteoporosis. Related to ESRD, patient needs to be referred to Rheumatology.

## 2017-12-29 ENCOUNTER — TELEPHONE (OUTPATIENT)
Dept: OBSTETRICS AND GYNECOLOGY | Facility: CLINIC | Age: 67
End: 2017-12-29

## 2017-12-29 NOTE — TELEPHONE ENCOUNTER
----- Message from Renee Cheng NP sent at 12/28/2017  3:02 PM CST -----  Patient needs to be referred for treatment of osteoporosis to rheumatology secondary to ESRD

## 2018-01-02 ENCOUNTER — OFFICE VISIT (OUTPATIENT)
Dept: RHEUMATOLOGY | Facility: CLINIC | Age: 68
End: 2018-01-02
Payer: MEDICARE

## 2018-01-02 VITALS
HEIGHT: 59 IN | BODY MASS INDEX: 26.31 KG/M2 | DIASTOLIC BLOOD PRESSURE: 62 MMHG | WEIGHT: 130.5 LBS | HEART RATE: 67 BPM | SYSTOLIC BLOOD PRESSURE: 127 MMHG

## 2018-01-02 DIAGNOSIS — R26.89 IMBALANCE: ICD-10-CM

## 2018-01-02 DIAGNOSIS — N18.6 ESRD ON HEMODIALYSIS: Chronic | ICD-10-CM

## 2018-01-02 DIAGNOSIS — E55.9 VITAMIN D DEFICIENCY: ICD-10-CM

## 2018-01-02 DIAGNOSIS — M81.0 AGE-RELATED OSTEOPOROSIS WITHOUT CURRENT PATHOLOGICAL FRACTURE: Primary | ICD-10-CM

## 2018-01-02 DIAGNOSIS — R29.6 FREQUENT FALLS: ICD-10-CM

## 2018-01-02 DIAGNOSIS — Z99.2 ESRD ON HEMODIALYSIS: Chronic | ICD-10-CM

## 2018-01-02 PROCEDURE — 99213 OFFICE O/P EST LOW 20 MIN: CPT | Mod: PBBFAC,PO | Performed by: PHYSICIAN ASSISTANT

## 2018-01-02 PROCEDURE — 99204 OFFICE O/P NEW MOD 45 MIN: CPT | Mod: S$PBB,,, | Performed by: PHYSICIAN ASSISTANT

## 2018-01-02 PROCEDURE — 99999 PR PBB SHADOW E&M-EST. PATIENT-LVL III: CPT | Mod: PBBFAC,,, | Performed by: PHYSICIAN ASSISTANT

## 2018-01-02 NOTE — PROGRESS NOTES
Subjective:       Patient ID: Talia Mcfarland is a 67 y.o. female.    Chief Complaint: Osteoporosis    Talia is here today for new patient evaluation for osteoporosis in the setting of end-stage renal disease at the request of Renee CRISOSTOMO in the gynecology dept.     Talia is a 67-year-old female patient with end-stage renal disease currently on hemodialysis 3 times weekly managed through the nephrology Department and  Dr Fuentes.  She had a bone density done 12/28/17 meeting criteria for osteoporosis at the left femur and the left femur neck with T-scores -2.6.  She denies any prior fractures but reports imbalance and recurrent falling.  She does have vitamin D deficiency and takes 50,000 units 3 times a week on vitamin D.  On our last labs she did have a normal magnesium and phosphorus level, normal calcium.  Last vitamin D level XII.9 but this was in March 2017, no PTH noted.  She receives her dialysis at [facility] and has frequent lab draws.      She does have chronic pain and sees pain management and is prescribed narcotic pain medication.  Rates her pain level today 10/10.  Generalized and lower back     Past medical history positive for diabetes causing end-stage renal disease and gastroparesis, hemodialysis and chronic anemia      Review of Systems   Constitutional: Negative.  Negative for activity change, appetite change, chills, fatigue and fever.   HENT: Negative.  Negative for mouth sores and trouble swallowing.         No dry mouth   Eyes: Negative.  Negative for photophobia, pain and redness.        No swollen or red eyes, no dry eye     Respiratory: Negative.  Negative for chest tightness, shortness of breath, wheezing and stridor.    Cardiovascular: Negative.  Negative for chest pain.   Gastrointestinal: Negative.  Negative for abdominal pain, blood in stool, diarrhea, nausea and vomiting.   Genitourinary: Negative.  Negative for dysuria, frequency, hematuria and urgency.  "  Musculoskeletal: Positive for arthralgias, back pain and gait problem. Negative for joint swelling, myalgias, neck pain and neck stiffness.   Skin: Negative.  Negative for color change, pallor and rash.   Neurological: Negative for weakness.   Hematological: Negative for adenopathy.   Psychiatric/Behavioral: Negative for suicidal ideas.         Objective:   /62   Pulse 67   Ht 4' 11" (1.499 m)   Wt 59.2 kg (130 lb 8.2 oz)   LMP  (LMP Unknown)   BMI 26.36 kg/m²      Physical Exam   Constitutional: She is oriented to person, place, and time and well-developed, well-nourished, and in no distress. No distress.   HENT:   Head: Normocephalic and atraumatic.   Right Ear: External ear normal.   Left Ear: External ear normal.   Mouth/Throat: No oropharyngeal exudate.   Eyes: Conjunctivae and EOM are normal. Pupils are equal, round, and reactive to light. No scleral icterus.   Neck: Normal range of motion. Neck supple. No thyromegaly present.   Cardiovascular: Normal rate, regular rhythm and normal heart sounds.    No murmur heard.  Pulmonary/Chest: Effort normal and breath sounds normal. She exhibits no tenderness.   Abdominal: Soft. Bowel sounds are normal.   Lymphadenopathy:     She has no cervical adenopathy.   Neurological: She is alert and oriented to person, place, and time. She displays normal reflexes. No cranial nerve deficit. She exhibits normal muscle tone. Gait normal.   Skin: Skin is warm and dry. No rash noted.     Musculoskeletal: She exhibits deformity. She exhibits no edema or tenderness.   She has some mild kyphosis of the mid back  No tenderness to palpation  She does have some mild generalized weakness  Mild osteoarthritic changes  No synovitis on exam             Labs from [facility]--  1/4/17 phosphorous low at 1.2  1/11/17 intact  1/16/17 phosphorous 1.21/20/17 magnesium 1.7 and phosphorus 2.2  1/20/17         Component      Latest Ref Rng & Units 10/2/2017   Sodium      136 " - 145 mmol/L 139   Potassium      3.5 - 5.1 mmol/L 3.4 (L)   Chloride      95 - 110 mmol/L 98   CO2      23 - 29 mmol/L 25   Glucose      70 - 110 mg/dL 73   BUN, Bld      8 - 23 mg/dL 16   Creatinine      0.5 - 1.4 mg/dL 6.3 (H)   Calcium      8.7 - 10.5 mg/dL 9.1   Total Protein      6.0 - 8.4 g/dL 7.2   Albumin      3.5 - 5.2 g/dL 3.0 (L)   Total Bilirubin      0.1 - 1.0 mg/dL 0.8   Alkaline Phosphatase      55 - 135 U/L 57   AST      10 - 40 U/L 10   ALT      10 - 44 U/L <5 (L)   Anion Gap      8 - 16 mmol/L 16   eGFR if African American      >60 mL/min/1.73 m:2 7 (A)   eGFR if non African American      >60 mL/min/1.73 m:2 6 (A)       Component      Latest Ref Rng & Units 10/2/2017   Magnesium      1.6 - 2.6 mg/dL 2.0     Component      Latest Ref Rng & Units 12/23/2016   Magnesium      1.6 - 2.6 mg/dL 2.0   Phosphorus      2.7 - 4.5 mg/dL 4.2         DEXA 12/28/17  Left femur mean 0.679 g/cm² with a T score -2.6  Left femur neck 0.683 g/cm² with a T score -2.6  Spine L1-L2 0.981 g/cm² T score -1.5  Osteoporosis      Assessment:       1. Age-related osteoporosis without current pathological fracture    2. ESRD on hemodialysis    3. Vitamin D deficiency    4. Imbalance    5. Frequent falls          1.  Osteoporosis without history of fracture in the setting of patient with end-stage renal disease on dialysis- T score at the left femur neck -2.6  Treatment is limited    2.  Chronic low vitamin D on replacement 50,000 units 3 times weekly  Normal magnesium and phosphorus  Need to obtain more lab records for most recent vitamin D, phosphorus and PTH level    3.  Imbalance with frequent falling    4. ESRD on HD 3 X weekly- monitored by Dr Fuentes- Nephrology     Plan:         Discussed pathology and treatment of osteoporosis, discussed limitations to treatment with  esrd   Discussed imbalance and frequent fall increasing her risk for fracture  We'll refer her to physical therapy for fall prevention,  muscle  strengthening, and balance training     Continue her vitamin D supplementation 50K 3x weekly per renal  Get copies of her most recent lab work especially vitamin D, PTH, magnesium, phosphorus and metabolic panel from [facility]      Addendum: I spoke with  regarding Prolia he said to go ahead and initiate Prolia therapy, watch her calcium closely and screen for any urinary tract infections   Outpatient discussed the initiation of Prolia, went over risks versus benefits and potential side effects again with patient   Medication literature had already been issued previously     Will get insurance approval start Prolia in 1-2 weeks we'll do a complete metabolic panel stat before administration of Prolia   We'll increase her calcium and magnesium intake for 2 weeks post Prolia   Follow her calcium levels-CMP once weekly for 3 weeks post Prolia - we will see if we can set this up with her dialysis center       Rationale for use of Prolia We are very limited to treatment all diphosphonate meds are contraindicated, prolia would be our best option but managing renal osteodystrophy would supersede our treatment so we would only proceed with approval of nephrology. we have heard back and have confirmation that okay to proceed                         Copy Renee Cheng NP-C  Copy Dr baptiste

## 2018-01-02 NOTE — LETTER
January 2, 2018      Renee Cheng NP  [address] [place] [address]           [facility] - Rheumatology  [address] [place] [address]  Phone: [phone]  Fax: [phone]          Patient: Talia Mcfarland   MR Number: [id number]   YOB: 1950   Date of Visit: 1/2/2018       Dear Renee Cheng:    Thank you for referring Talia Mcfarland to me for evaluation. Attached you will find relevant portions of my assessment and plan of care.    If you have questions, please do not hesitate to call me. I look forward to following Talia Mcfarland along with you.    Sincerely,    Renee Solitario PA-C    Enclosure  CC:  No Recipients    If you would like to receive this communication electronically, please contact [email or web address] or [phone] to request more information on [employer] Link access.    For providers and/or their staff who would like to refer a patient to [facility], please contact us through our one-stop-shop provider referral line, [facility]pin, at [phone].    If you feel you have received this communication in error or would no longer like to receive these types of communications, please e-mail [email or web address]

## 2018-01-02 NOTE — Clinical Note
Mrs Yeager was referred to Rheum by GYN dexa showed osteoporosis T score -2.6 at left hip Treatment options  limited with her ESRD  Prolia would be best option but management of  renal osteodystrophy would supersede our treating osteoporosis so we  would only proceed with your approval.  I did refer her to PT for muscle strengthening, balance training and fall prevention  Thank you Renee Solitario PA-C

## 2018-01-09 ENCOUNTER — TELEPHONE (OUTPATIENT)
Dept: RHEUMATOLOGY | Facility: CLINIC | Age: 68
End: 2018-01-09

## 2018-01-09 DIAGNOSIS — M81.0 AGE-RELATED OSTEOPOROSIS WITHOUT CURRENT PATHOLOGICAL FRACTURE: Primary | ICD-10-CM

## 2018-01-09 DIAGNOSIS — N18.6 ESRD ON HEMODIALYSIS: Chronic | ICD-10-CM

## 2018-01-09 DIAGNOSIS — Z99.2 ESRD ON HEMODIALYSIS: Chronic | ICD-10-CM

## 2018-01-09 NOTE — TELEPHONE ENCOUNTER
----- Message from Gurvinder Rios MD sent at 1/3/2018 10:41 AM CST -----  Proceed with prolia and recheck calcium levels and watch for UTI     Thanks Renee DODD  ----- Message -----  From: Renee Solitario PA-C  Sent: 1/2/2018   3:23 PM  To: Braden Fuentes MD    Mrs Yeager was referred to Rheum by GYN  dexa showed osteoporosis T score -2.6 at left hip  Treatment options  limited with her ESRD   Prolia would be best option but management of  renal osteodystrophy would supersede our treating osteoporosis so we  would only proceed with your approval.   I did refer her to PT for muscle strengthening, balance training and fall prevention    Thank you  Renee Solitario PA-C

## 2018-01-09 NOTE — TELEPHONE ENCOUNTER
Get copies of her most recent lab work especially vitamin D, PTH, magnesium, phosphorus and metabolic panel from [facility] on [address]    If not done then will do labs    Called mani to let her know I had heard back from dr baptiste and he is ok to move forward with prolia just watch her calcium level closely    Discussed risk vx benefits again  Med literature given to pt last week    Please set up nurse visit in the next 1-2 weeks for pt  to start prolia-same day labs- cmp only  If unable to obtain labs from [facility] then want to do vit D, pth, phos and cmp the day of her nurse visit     Prolia auth and orders entered

## 2018-01-10 ENCOUNTER — TELEPHONE (OUTPATIENT)
Dept: RHEUMATOLOGY | Facility: CLINIC | Age: 68
End: 2018-01-10

## 2018-01-19 ENCOUNTER — TELEPHONE (OUTPATIENT)
Dept: RHEUMATOLOGY | Facility: CLINIC | Age: 68
End: 2018-01-19

## 2018-01-19 NOTE — TELEPHONE ENCOUNTER
Called mrs brunson    Spoke to [facility] np in nephrology    The have mrs brunson on calcium bath with dialysis because she was not compliant with taking oral calcium  She is not taking her tums as instructed and at times misses her HD    We will follow her calcium levels for the next 4-6 weeks and I dicussed importance of compliance with tums, oral meds and HD apt. Pt verbalized understanding    Discussed risk of treatment with prolia and discussed risk and potential consequences of major fracture sulma hip    Pt verbalized understanding    I cancelled her apt for 1/23/18 for lab and nurse visit for prolia  Need to get her weekly labs from [facility] [address]    Mrs live was supposed to have them fax me a weekly copy but have not seen any yet.

## 2018-01-22 ENCOUNTER — TELEPHONE (OUTPATIENT)
Dept: RHEUMATOLOGY | Facility: CLINIC | Age: 68
End: 2018-01-22

## 2018-01-22 NOTE — TELEPHONE ENCOUNTER
Spoke to Lulu and requested for labs for patient from [facility] on [address] [phone]. Fax number provided

## 2018-01-26 ENCOUNTER — DOCUMENTATION ONLY (OUTPATIENT)
Dept: RHEUMATOLOGY | Facility: CLINIC | Age: 68
End: 2018-01-26

## 2018-01-26 NOTE — PROGRESS NOTES
Labs received    1/19/18   Ca 8.1, corrected Ca 9.1    Will continue to follow her calcium for then next month before we consider starting prolia

## 2018-05-22 DIAGNOSIS — R10.9 ABDOMINAL PAIN, UNSPECIFIED ABDOMINAL LOCATION: Primary | ICD-10-CM

## 2018-06-04 ENCOUNTER — INITIAL CONSULT (OUTPATIENT)
Dept: GASTROENTEROLOGY | Facility: CLINIC | Age: 68
End: 2018-06-04
Payer: MEDICARE

## 2018-06-04 VITALS
DIASTOLIC BLOOD PRESSURE: 78 MMHG | HEIGHT: 59 IN | SYSTOLIC BLOOD PRESSURE: 122 MMHG | HEART RATE: 66 BPM | BODY MASS INDEX: 23.74 KG/M2 | WEIGHT: 117.75 LBS

## 2018-06-04 DIAGNOSIS — E11.43 GASTROPARESIS DUE TO DM: Primary | ICD-10-CM

## 2018-06-04 DIAGNOSIS — K31.84 GASTROPARESIS DUE TO DM: Primary | ICD-10-CM

## 2018-06-04 PROCEDURE — 99213 OFFICE O/P EST LOW 20 MIN: CPT | Mod: PBBFAC,PO | Performed by: NURSE PRACTITIONER

## 2018-06-04 PROCEDURE — 99999 PR PBB SHADOW E&M-EST. PATIENT-LVL III: CPT | Mod: PBBFAC,,, | Performed by: NURSE PRACTITIONER

## 2018-06-04 PROCEDURE — 99204 OFFICE O/P NEW MOD 45 MIN: CPT | Mod: S$PBB,,, | Performed by: NURSE PRACTITIONER

## 2018-06-04 RX ORDER — PANTOPRAZOLE SODIUM 20 MG/1
20 TABLET, DELAYED RELEASE ORAL DAILY
Qty: 30 TABLET | Refills: 11 | Status: ON HOLD | OUTPATIENT
Start: 2018-06-04 | End: 2018-07-18 | Stop reason: HOSPADM

## 2018-06-04 RX ORDER — ASPIRIN 81 MG/1
81 TABLET ORAL DAILY
Status: ON HOLD | COMMUNITY
End: 2018-07-18 | Stop reason: HOSPADM

## 2018-06-04 NOTE — LETTER
June 5, 2018      Braden Fuentes MD  [address] [place] [address]           [facility]  [address]  [place] [address]  Phone: [phone]  Fax: [phone]          Patient: Talia Mcfarland   MR Number: [id number]   YOB: 1950   Date of Visit: 6/4/2018       Dear Dr. Braden Fuentes:    Thank you for referring Talia Mcfarland to me for evaluation. Attached you will find relevant portions of my assessment and plan of care.    If you have questions, please do not hesitate to call me. I look forward to following Talia Mcfarland along with you.    Sincerely,    Harriet Fang, [facility]    Enclosure  CC:  No Recipients    If you would like to receive this communication electronically, please contact [email or web address] or [phone] to request more information on [employer] Link access.    For providers and/or their staff who would like to refer a patient to [facility], please contact us through our one-stop-shop provider referral line, [facility], at [phone].    If you feel you have received this communication in error or would no longer like to receive these types of communications, please e-mail [email or web address]

## 2018-06-05 NOTE — PROGRESS NOTES
"Clinic Consult:  [facility] Consultation Note    Reason for Consult:  The encounter diagnosis was Gastroparesis due to DM.    PCP: Tank Castro   [address] [address]    HPI:  This is a 68 y.o. female here for evaluation of the above  Pt states that she was diagnosed with gastroparesis "a long time ago".  She states that she has had a persistent, mild generalized abdominal pain with the diagnosis.  She has tried medications previously without any significant improvement.    She has occasional nausea with some vomiting.  Is not currently on PPI.  Was previously on omeprazole, but reports it worsened her abdominal pain.   She denies any melena or hematochezia.  No change in bowel pattern.  No weight loss.   No recent medication changes.   She is on chronic opioids for back pain.       Review of Systems   Constitutional: Positive for malaise/fatigue. Negative for chills, fever and weight loss.   Respiratory: Negative for cough.    Cardiovascular: Negative for chest pain.   Gastrointestinal:        Per HPI   Musculoskeletal: Positive for back pain. Negative for myalgias.   Skin: Negative for itching and rash.   Neurological: Negative for headaches.   Psychiatric/Behavioral: The patient is not nervous/anxious.        Medical History:   Past Medical History:   Diagnosis Date    Anemia     CHF (congestive heart failure)     Diabetes mellitus     DVT (deep venous thrombosis)     Left leg    ESRD on hemodialysis 2 years     Gastroparesis     Hx of diabetic gastroparesis 3/18/2015    Hypertension     Type II or unspecified type diabetes mellitus with renal manifestations, not stated as uncontrolled(250.40) 30 years     Urinary tract infection due to extended-spectrum beta lactamase (ESBL)-producing Klebsiella        Surgical History:  Past Surgical History:   Procedure Laterality Date     SECTION      CHOLECYSTECTOMY      KUSHAL Bilateral     LBP severe with multiple KUSHAL  " "   ROTATOR CUFF REPAIR      UMBILICAL HERNIA REPAIR      VEIN BYPASS SURGERY Left        Family History:   Family History   Problem Relation Age of Onset    Kidney disease Neg Hx        Social History:   Social History   Substance Use Topics    Smoking status: Never Smoker    Smokeless tobacco: Never Used    Alcohol use No       Allergies: Reviewed    Home Medications:   Current Outpatient Prescriptions on File Prior to Visit   Medication Sig Dispense Refill    amLODIPine (NORVASC) 5 MG tablet TAKE 1 TABLET BY MOUTH DAILY      carvedilol (COREG) 25 MG tablet Take 25 mg by mouth 2 (two) times daily with meals.      hydrocodone-acetaminophen 5-325mg (NORCO) 5-325 mg per tablet Take 1 tablet by mouth 2 (two) times daily as needed for Pain. 15 tablet 0    levothyroxine (SYNTHROID) 25 MCG tablet Take 25 mcg by mouth once daily.  0    lidocaine-prilocaine (EMLA) cream Apply topically as needed. APPLY TO TREATMENT AREA 1 HOUR PRIOR TO TREATMENT UTD  3    promethazine (PHENERGAN) 25 MG tablet Take 1 tablet (25 mg total) by mouth every 6 (six) hours as needed for Nausea. 15 tablet 0    ropinirole (REQUIP) 0.5 MG tablet Take 1 tablet (0.5 mg total) by mouth 3 (three) times daily. 15 tablet 0     Current Facility-Administered Medications on File Prior to Visit   Medication Dose Route Frequency Provider Last Rate Last Dose    denosumab (PROLIA) injection 60 mg  60 mg Subcutaneous Q6 Months Renee Solitario PA-C           Physical Exam:  Vital Signs:  /78   Pulse 66   Ht 4' 11" (1.499 m)   Wt 53.4 kg (117 lb 11.6 oz)   LMP  (LMP Unknown)   BMI 23.78 kg/m²   Body mass index is 23.78 kg/m².  Physical Exam   Constitutional: She is oriented to person, place, and time. No distress.   Chronically ill appearing   HENT:   Head: Normocephalic.   Eyes: No scleral icterus.   Neck: Normal range of motion.   Cardiovascular: Normal rate and regular rhythm.    Pulmonary/Chest: Effort normal and breath sounds normal. "   Abdominal: Soft. Bowel sounds are normal. She exhibits no distension (generalized). There is tenderness.   Musculoskeletal: She exhibits no edema.   In wheelchair   Neurological: She is alert and oriented to person, place, and time.   Skin: Skin is warm and dry.   Psychiatric: She has a normal mood and affect.   Vitals reviewed.      Labs: Pertinent labs reviewed.    Assessment:  1. Gastroparesis due to DM         Recommendations:  Gastroparesis due to DM  - discussed possible treatment and possible EGD for further investigation and to R/O other causes of the abdominal pain.   - Pt is not interested in any procedures at this time  - Will start on Protonix once daily  - Encouraged pt to decrease narcotic use as this will worsen the gastroparesis.   - Small, frequent meals discussed and encouraged.     -     pantoprazole (PROTONIX) 20 MG tablet; Take 1 tablet (20 mg total) by mouth once daily.  Dispense: 30 tablet; Refill: 11          Follow-up if symptoms worsen or fail to improve.        Thank you so much for allowing me to participate in the care of Talia CECILIA Seth

## 2018-07-11 ENCOUNTER — HOSPITAL ENCOUNTER (INPATIENT)
Facility: HOSPITAL | Age: 68
LOS: 7 days | Discharge: HOSPICE | DRG: 870 | End: 2018-07-18
Attending: EMERGENCY MEDICINE | Admitting: INTERNAL MEDICINE
Payer: MEDICARE

## 2018-07-11 DIAGNOSIS — R60.9 SWELLING: ICD-10-CM

## 2018-07-11 DIAGNOSIS — D69.6 THROMBOCYTOPENIA: Chronic | ICD-10-CM

## 2018-07-11 DIAGNOSIS — Z91.199 HX OF NONCOMPLIANCE WITH MEDICAL TREATMENT, PRESENTING HAZARDS TO HEALTH: ICD-10-CM

## 2018-07-11 DIAGNOSIS — G93.40 ACUTE ENCEPHALOPATHY: ICD-10-CM

## 2018-07-11 DIAGNOSIS — I46.9 CARDIAC ARREST: ICD-10-CM

## 2018-07-11 DIAGNOSIS — R07.9 CHEST PAIN: ICD-10-CM

## 2018-07-11 DIAGNOSIS — E11.65 TYPE 2 DIABETES MELLITUS WITH HYPERGLYCEMIA, WITHOUT LONG-TERM CURRENT USE OF INSULIN: ICD-10-CM

## 2018-07-11 DIAGNOSIS — I10 ESSENTIAL HYPERTENSION: Chronic | ICD-10-CM
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
## 2018-07-11 DIAGNOSIS — N39.0 E. COLI URINARY TRACT INFECTION: ICD-10-CM

## 2018-07-11 DIAGNOSIS — B96.20 E. COLI URINARY TRACT INFECTION: ICD-10-CM

## 2018-07-11 DIAGNOSIS — Z99.2 ESRD ON HEMODIALYSIS: Chronic | ICD-10-CM

## 2018-07-11 DIAGNOSIS — J18.9 PNEUMONIA: ICD-10-CM

## 2018-07-11 DIAGNOSIS — T14.8XXA DEEP TISSUE INJURY: ICD-10-CM

## 2018-07-11 DIAGNOSIS — R09.2 RESPIRATORY ARREST: Primary | ICD-10-CM

## 2018-07-11 DIAGNOSIS — J18.9 HAP (HOSPITAL-ACQUIRED PNEUMONIA): ICD-10-CM

## 2018-07-11 DIAGNOSIS — R41.82 ALTERED MENTAL STATUS: ICD-10-CM

## 2018-07-11 DIAGNOSIS — R41.82 ALTERED MENTAL STATUS, UNSPECIFIED ALTERED MENTAL STATUS TYPE: ICD-10-CM

## 2018-07-11 DIAGNOSIS — J96.01 ACUTE RESPIRATORY FAILURE WITH HYPOXIA: ICD-10-CM

## 2018-07-11 DIAGNOSIS — M86.9 OSTEOMYELITIS: ICD-10-CM

## 2018-07-11 DIAGNOSIS — N18.6 ESRD ON HEMODIALYSIS: Chronic | ICD-10-CM

## 2018-07-11 DIAGNOSIS — A41.59: ICD-10-CM

## 2018-07-11 PROBLEM — A41.9 SEVERE SEPSIS: Status: ACTIVE | Noted: 2018-07-11

## 2018-07-11 PROBLEM — J96.00 ACUTE RESPIRATORY FAILURE: Status: ACTIVE | Noted: 2018-07-11

## 2018-07-11 PROBLEM — R65.20 SEVERE SEPSIS: Status: ACTIVE | Noted: 2018-07-11

## 2018-07-11 PROBLEM — E11.9 DM TYPE 2 (DIABETES MELLITUS, TYPE 2): Status: ACTIVE | Noted: 2018-07-11

## 2018-07-11 LAB
ALBUMIN SERPL BCP-MCNC: 2.6 G/DL
ALBUMIN SERPL BCP-MCNC: 2.9 G/DL
ALLENS TEST: ABNORMAL
ALP SERPL-CCNC: 105 U/L
ALP SERPL-CCNC: 76 U/L
ALT SERPL W/O P-5'-P-CCNC: 11 U/L
ALT SERPL W/O P-5'-P-CCNC: 43 U/L
AMORPH CRY URNS QL MICRO: ABNORMAL
AMPHET+METHAMPHET UR QL: NEGATIVE
ANION GAP SERPL CALC-SCNC: 17 MMOL/L
ANION GAP SERPL CALC-SCNC: 17 MMOL/L
ANISOCYTOSIS BLD QL SMEAR: SLIGHT
AORTIC VALVE STENOSIS: ABNORMAL
AST SERPL-CCNC: 17 U/L
AST SERPL-CCNC: 75 U/L
BACTERIA #/AREA URNS HPF: ABNORMAL /HPF
BARBITURATES UR QL SCN>200 NG/ML: NEGATIVE
BASOPHILS # BLD AUTO: 0.01 K/UL
BASOPHILS # BLD AUTO: 0.02 K/UL
BASOPHILS NFR BLD: 0.1 %
BASOPHILS NFR BLD: 0.1 %
BENZODIAZ UR QL SCN>200 NG/ML: NEGATIVE
BILIRUB SERPL-MCNC: 0.6 MG/DL
BILIRUB SERPL-MCNC: 0.8 MG/DL
BILIRUB UR QL STRIP: ABNORMAL
BNP SERPL-MCNC: >4900 PG/ML
BUN SERPL-MCNC: 20 MG/DL
BUN SERPL-MCNC: 24 MG/DL
BZE UR QL SCN: NEGATIVE
CALCIUM SERPL-MCNC: 10.5 MG/DL
CALCIUM SERPL-MCNC: 9.8 MG/DL
CANNABINOIDS UR QL SCN: NEGATIVE
CHLORIDE SERPL-SCNC: 95 MMOL/L
CHLORIDE SERPL-SCNC: 97 MMOL/L
CLARITY UR: ABNORMAL
CO2 SERPL-SCNC: 25 MMOL/L
CO2 SERPL-SCNC: 25 MMOL/L
COLOR UR: ABNORMAL
CREAT SERPL-MCNC: 3.6 MG/DL
CREAT SERPL-MCNC: 3.8 MG/DL
CREAT UR-MCNC: <5 MG/DL
DACRYOCYTES BLD QL SMEAR: ABNORMAL
DELSYS: ABNORMAL
DIFFERENTIAL METHOD: ABNORMAL
DIFFERENTIAL METHOD: ABNORMAL
EOSINOPHIL # BLD AUTO: 0 K/UL
EOSINOPHIL # BLD AUTO: 0 K/UL
EOSINOPHIL NFR BLD: 0 %
EOSINOPHIL NFR BLD: 0.1 %
ERYTHROCYTE [DISTWIDTH] IN BLOOD BY AUTOMATED COUNT: 19.2 %
ERYTHROCYTE [DISTWIDTH] IN BLOOD BY AUTOMATED COUNT: 19.7 %
ERYTHROCYTE [SEDIMENTATION RATE] IN BLOOD BY WESTERGREN METHOD: 22 MM/H
EST. GFR  (AFRICAN AMERICAN): 13 ML/MIN/1.73 M^2
EST. GFR  (AFRICAN AMERICAN): 14 ML/MIN/1.73 M^2
EST. GFR  (NON AFRICAN AMERICAN): 12 ML/MIN/1.73 M^2
EST. GFR  (NON AFRICAN AMERICAN): 12 ML/MIN/1.73 M^2
ESTIMATED PA SYSTOLIC PRESSURE: 59.09
FIO2: 100
GIANT PLATELETS BLD QL SMEAR: PRESENT
GLUCOSE SERPL-MCNC: 76 MG/DL
GLUCOSE SERPL-MCNC: 97 MG/DL (ref 70–110)
GLUCOSE SERPL-MCNC: 99 MG/DL
GLUCOSE UR QL STRIP: ABNORMAL
HCO3 UR-SCNC: 31.7 MMOL/L (ref 24–28)
HCT VFR BLD AUTO: 37 %
HCT VFR BLD AUTO: 40.6 %
HGB BLD-MCNC: 10.9 G/DL
HGB BLD-MCNC: 12 G/DL
HGB UR QL STRIP: ABNORMAL
HYALINE CASTS #/AREA URNS LPF: ABNORMAL /LPF
HYPOCHROMIA BLD QL SMEAR: ABNORMAL
KETONES UR QL STRIP: ABNORMAL
LACTATE SERPL-SCNC: 2.2 MMOL/L
LACTATE SERPL-SCNC: 3.3 MMOL/L
LEUKOCYTE ESTERASE UR QL STRIP: ABNORMAL
LYMPHOCYTES # BLD AUTO: 0.4 K/UL
LYMPHOCYTES # BLD AUTO: 0.8 K/UL
LYMPHOCYTES NFR BLD: 3.7 %
LYMPHOCYTES NFR BLD: 5.6 %
MAGNESIUM SERPL-MCNC: 2 MG/DL
MCH RBC QN AUTO: 24.2 PG
MCH RBC QN AUTO: 24.3 PG
MCHC RBC AUTO-ENTMCNC: 29.5 G/DL
MCHC RBC AUTO-ENTMCNC: 29.6 G/DL
MCV RBC AUTO: 82 FL
MCV RBC AUTO: 82 FL
METHADONE UR QL SCN>300 NG/ML: NEGATIVE
MICROSCOPIC COMMENT: ABNORMAL
MITRAL VALVE MOBILITY: ABNORMAL
MODE: ABNORMAL
MONOCYTES # BLD AUTO: 0.2 K/UL
MONOCYTES # BLD AUTO: 0.8 K/UL
MONOCYTES NFR BLD: 1.9 %
MONOCYTES NFR BLD: 5.6 %
NEUTROPHILS # BLD AUTO: 11.1 K/UL
NEUTROPHILS # BLD AUTO: 13.4 K/UL
NEUTROPHILS NFR BLD: 88.9 %
NEUTROPHILS NFR BLD: 94.3 %
NITRITE UR QL STRIP: ABNORMAL
NON-SQ EPI CELLS #/AREA URNS HPF: ABNORMAL /HPF
OPIATES UR QL SCN: NEGATIVE
OVALOCYTES BLD QL SMEAR: ABNORMAL
PCO2 BLDA: 49.3 MMHG (ref 35–45)
PCP UR QL SCN>25 NG/ML: NEGATIVE
PEEP: 5
PH SMN: 7.42 [PH] (ref 7.35–7.45)
PH UR STRIP: ABNORMAL [PH] (ref 5–8)
PLATELET # BLD AUTO: 165 K/UL
PLATELET # BLD AUTO: 165 K/UL
PLATELET BLD QL SMEAR: ABNORMAL
PMV BLD AUTO: 9.3 FL
PMV BLD AUTO: ABNORMAL FL
PO2 BLDA: 138 MMHG (ref 80–100)
POC BE: 7 MMOL/L
POC SATURATED O2: 99 % (ref 95–100)
POCT GLUCOSE: 114 MG/DL (ref 70–110)
POCT GLUCOSE: 97 MG/DL (ref 70–110)
POIKILOCYTOSIS BLD QL SMEAR: SLIGHT
POLYCHROMASIA BLD QL SMEAR: ABNORMAL
POTASSIUM SERPL-SCNC: 4.2 MMOL/L
POTASSIUM SERPL-SCNC: 5.4 MMOL/L
PROCALCITONIN SERPL IA-MCNC: 2.77 NG/ML
PROT SERPL-MCNC: 6.7 G/DL
PROT SERPL-MCNC: 8.1 G/DL
PROT UR QL STRIP: ABNORMAL
RBC # BLD AUTO: 4.51 M/UL
RBC # BLD AUTO: 4.93 M/UL
RBC #/AREA URNS HPF: >100 /HPF (ref 0–4)
RETIRED EF AND QEF - SEE NOTES: 60 (ref 55–65)
SAMPLE: ABNORMAL
SCHISTOCYTES BLD QL SMEAR: PRESENT
SITE: ABNORMAL
SODIUM SERPL-SCNC: 137 MMOL/L
SODIUM SERPL-SCNC: 139 MMOL/L
SP GR UR STRIP: ABNORMAL (ref 1–1.03)
SPHEROCYTES BLD QL SMEAR: ABNORMAL
SQUAMOUS #/AREA URNS HPF: ABNORMAL /HPF
STOMATOCYTES BLD QL SMEAR: PRESENT
TARGETS BLD QL SMEAR: ABNORMAL
TOXICOLOGY INFORMATION: ABNORMAL
TRICUSPID VALVE REGURGITATION: ABNORMAL
TROPONIN I SERPL DL<=0.01 NG/ML-MCNC: 0.04 NG/ML
TSH SERPL DL<=0.005 MIU/L-ACNC: 1.35 UIU/ML
URN SPEC COLLECT METH UR: ABNORMAL
UROBILINOGEN UR STRIP-ACNC: ABNORMAL EU/DL
VT: 300
WBC # BLD AUTO: 11.76 K/UL
WBC # BLD AUTO: 15.1 K/UL
WBC #/AREA URNS HPF: >100 /HPF (ref 0–5)
WBC CLUMPS URNS QL MICRO: ABNORMAL
YEAST URNS QL MICRO: ABNORMAL

## 2018-07-11 PROCEDURE — 63600175 PHARM REV CODE 636 W HCPCS: Performed by: INTERNAL MEDICINE

## 2018-07-11 PROCEDURE — 84484 ASSAY OF TROPONIN QUANT: CPT

## 2018-07-11 PROCEDURE — 63600175 PHARM REV CODE 636 W HCPCS: Performed by: EMERGENCY MEDICINE

## 2018-07-11 PROCEDURE — 87070 CULTURE OTHR SPECIMN AEROBIC: CPT

## 2018-07-11 PROCEDURE — 85025 COMPLETE CBC W/AUTO DIFF WBC: CPT

## 2018-07-11 PROCEDURE — 87186 SC STD MICRODIL/AGAR DIL: CPT | Mod: 59

## 2018-07-11 PROCEDURE — 93306 TTE W/DOPPLER COMPLETE: CPT | Mod: 26,,, | Performed by: INTERNAL MEDICINE

## 2018-07-11 PROCEDURE — 83735 ASSAY OF MAGNESIUM: CPT

## 2018-07-11 PROCEDURE — 5A1955Z RESPIRATORY VENTILATION, GREATER THAN 96 CONSECUTIVE HOURS: ICD-10-PCS | Performed by: EMERGENCY MEDICINE

## 2018-07-11 PROCEDURE — 36415 COLL VENOUS BLD VENIPUNCTURE: CPT

## 2018-07-11 PROCEDURE — 87040 BLOOD CULTURE FOR BACTERIA: CPT

## 2018-07-11 PROCEDURE — 84443 ASSAY THYROID STIM HORMONE: CPT

## 2018-07-11 PROCEDURE — 87077 CULTURE AEROBIC IDENTIFY: CPT | Mod: 59

## 2018-07-11 PROCEDURE — 93010 ELECTROCARDIOGRAM REPORT: CPT | Mod: ,,, | Performed by: INTERNAL MEDICINE

## 2018-07-11 PROCEDURE — 20000000 HC ICU ROOM

## 2018-07-11 PROCEDURE — 25000003 PHARM REV CODE 250: Performed by: INTERNAL MEDICINE

## 2018-07-11 PROCEDURE — 25000003 PHARM REV CODE 250: Performed by: EMERGENCY MEDICINE

## 2018-07-11 PROCEDURE — 87205 SMEAR GRAM STAIN: CPT

## 2018-07-11 PROCEDURE — 0BH17EZ INSERTION OF ENDOTRACHEAL AIRWAY INTO TRACHEA, VIA NATURAL OR ARTIFICIAL OPENING: ICD-10-PCS | Performed by: EMERGENCY MEDICINE

## 2018-07-11 PROCEDURE — 96367 TX/PROPH/DG ADDL SEQ IV INF: CPT

## 2018-07-11 PROCEDURE — 93306 TTE W/DOPPLER COMPLETE: CPT

## 2018-07-11 PROCEDURE — 83605 ASSAY OF LACTIC ACID: CPT | Mod: 91

## 2018-07-11 PROCEDURE — 82962 GLUCOSE BLOOD TEST: CPT

## 2018-07-11 PROCEDURE — 63600175 PHARM REV CODE 636 W HCPCS

## 2018-07-11 PROCEDURE — 96375 TX/PRO/DX INJ NEW DRUG ADDON: CPT | Mod: 59

## 2018-07-11 PROCEDURE — 81000 URINALYSIS NONAUTO W/SCOPE: CPT | Mod: 59

## 2018-07-11 PROCEDURE — S0028 INJECTION, FAMOTIDINE, 20 MG: HCPCS | Performed by: INTERNAL MEDICINE

## 2018-07-11 PROCEDURE — 99285 EMERGENCY DEPT VISIT HI MDM: CPT | Mod: ,,, | Performed by: INTERNAL MEDICINE

## 2018-07-11 PROCEDURE — 25500020 PHARM REV CODE 255: Performed by: EMERGENCY MEDICINE

## 2018-07-11 PROCEDURE — 31500 INSERT EMERGENCY AIRWAY: CPT

## 2018-07-11 PROCEDURE — 87088 URINE BACTERIA CULTURE: CPT

## 2018-07-11 PROCEDURE — 84145 PROCALCITONIN (PCT): CPT

## 2018-07-11 PROCEDURE — 99291 CRITICAL CARE FIRST HOUR: CPT | Mod: ,,, | Performed by: INTERNAL MEDICINE

## 2018-07-11 PROCEDURE — 99291 CRITICAL CARE FIRST HOUR: CPT | Mod: 25

## 2018-07-11 PROCEDURE — 80307 DRUG TEST PRSMV CHEM ANLYZR: CPT

## 2018-07-11 PROCEDURE — 25000003 PHARM REV CODE 250

## 2018-07-11 PROCEDURE — 80053 COMPREHEN METABOLIC PANEL: CPT | Mod: 91

## 2018-07-11 PROCEDURE — 83036 HEMOGLOBIN GLYCOSYLATED A1C: CPT

## 2018-07-11 PROCEDURE — 83880 ASSAY OF NATRIURETIC PEPTIDE: CPT

## 2018-07-11 PROCEDURE — 94002 VENT MGMT INPAT INIT DAY: CPT

## 2018-07-11 PROCEDURE — 96365 THER/PROPH/DIAG IV INF INIT: CPT

## 2018-07-11 PROCEDURE — 92950 HEART/LUNG RESUSCITATION CPR: CPT

## 2018-07-11 PROCEDURE — 80053 COMPREHEN METABOLIC PANEL: CPT

## 2018-07-11 PROCEDURE — 5A12012 PERFORMANCE OF CARDIAC OUTPUT, SINGLE, MANUAL: ICD-10-PCS | Performed by: EMERGENCY MEDICINE

## 2018-07-11 PROCEDURE — 83605 ASSAY OF LACTIC ACID: CPT

## 2018-07-11 PROCEDURE — 87086 URINE CULTURE/COLONY COUNT: CPT

## 2018-07-11 PROCEDURE — 96366 THER/PROPH/DIAG IV INF ADDON: CPT

## 2018-07-11 PROCEDURE — 99900035 HC TECH TIME PER 15 MIN (STAT)

## 2018-07-11 RX ORDER — ATROPINE SULFATE 0.1 MG/ML
INJECTION INTRAVENOUS CODE/TRAUMA/SEDATION MEDICATION
Status: COMPLETED | OUTPATIENT
Start: 2018-07-11 | End: 2018-07-11

## 2018-07-11 RX ORDER — DEXTROSE MONOHYDRATE AND SODIUM CHLORIDE 5; .9 G/100ML; G/100ML
INJECTION, SOLUTION INTRAVENOUS CONTINUOUS
Status: DISCONTINUED | OUTPATIENT
Start: 2018-07-11 | End: 2018-07-12

## 2018-07-11 RX ORDER — HEPARIN SODIUM,PORCINE/D5W 25000/250
16 INTRAVENOUS SOLUTION INTRAVENOUS CONTINUOUS
Status: DISCONTINUED | OUTPATIENT
Start: 2018-07-11 | End: 2018-07-11

## 2018-07-11 RX ORDER — GLUCAGON 1 MG
1 KIT INJECTION
Status: DISCONTINUED | OUTPATIENT
Start: 2018-07-11 | End: 2018-07-18

## 2018-07-11 RX ORDER — INSULIN ASPART 100 [IU]/ML
0-5 INJECTION, SOLUTION INTRAVENOUS; SUBCUTANEOUS EVERY 6 HOURS PRN
Status: DISCONTINUED | OUTPATIENT
Start: 2018-07-11 | End: 2018-07-18

## 2018-07-11 RX ORDER — DOPAMINE HYDROCHLORIDE 160 MG/100ML
INJECTION, SOLUTION INTRAVENOUS
Status: DISPENSED
Start: 2018-07-11 | End: 2018-07-12

## 2018-07-11 RX ORDER — PROPOFOL 10 MG/ML
5 INJECTION, EMULSION INTRAVENOUS CONTINUOUS
Status: DISCONTINUED | OUTPATIENT
Start: 2018-07-11 | End: 2018-07-12

## 2018-07-11 RX ORDER — EPINEPHRINE 0.1 MG/ML
INJECTION INTRAVENOUS CODE/TRAUMA/SEDATION MEDICATION
Status: COMPLETED | OUTPATIENT
Start: 2018-07-11 | End: 2018-07-11

## 2018-07-11 RX ORDER — CHLORHEXIDINE GLUCONATE ORAL RINSE 1.2 MG/ML
15 SOLUTION DENTAL 2 TIMES DAILY
Status: DISCONTINUED | OUTPATIENT
Start: 2018-07-11 | End: 2018-07-18

## 2018-07-11 RX ORDER — FAMOTIDINE 20 MG/50ML
20 INJECTION, SOLUTION INTRAVENOUS DAILY
Status: DISCONTINUED | OUTPATIENT
Start: 2018-07-11 | End: 2018-07-11

## 2018-07-11 RX ORDER — SODIUM BICARBONATE 1 MEQ/ML
SYRINGE (ML) INTRAVENOUS CODE/TRAUMA/SEDATION MEDICATION
Status: COMPLETED | OUTPATIENT
Start: 2018-07-11 | End: 2018-07-11

## 2018-07-11 RX ORDER — CALCIUM CHLORIDE INJECTION 100 MG/ML
INJECTION, SOLUTION INTRAVENOUS CODE/TRAUMA/SEDATION MEDICATION
Status: COMPLETED | OUTPATIENT
Start: 2018-07-11 | End: 2018-07-11

## 2018-07-11 RX ORDER — DOPAMINE HYDROCHLORIDE 160 MG/100ML
10 INJECTION, SOLUTION INTRAVENOUS CONTINUOUS
Status: DISCONTINUED | OUTPATIENT
Start: 2018-07-11 | End: 2018-07-11

## 2018-07-11 RX ORDER — FAMOTIDINE 10 MG/ML
20 INJECTION INTRAVENOUS DAILY
Status: DISCONTINUED | OUTPATIENT
Start: 2018-07-12 | End: 2018-07-18

## 2018-07-11 RX ORDER — HEPARIN SODIUM 5000 [USP'U]/ML
5000 INJECTION, SOLUTION INTRAVENOUS; SUBCUTANEOUS EVERY 8 HOURS
Status: DISCONTINUED | OUTPATIENT
Start: 2018-07-11 | End: 2018-07-18

## 2018-07-11 RX ADMIN — DOPAMINE HYDROCHLORIDE IN DEXTROSE 10 MCG/KG/MIN: 1.6 INJECTION, SOLUTION INTRAVENOUS at 02:07

## 2018-07-11 RX ADMIN — DEXTROSE AND SODIUM CHLORIDE: 5; .9 INJECTION, SOLUTION INTRAVENOUS at 06:07

## 2018-07-11 RX ADMIN — CHLORHEXIDINE GLUCONATE 15 ML: 1.2 RINSE ORAL at 10:07

## 2018-07-11 RX ADMIN — PIPERACILLIN SODIUM AND TAZOBACTAM SODIUM 4.5 G: 4; .5 INJECTION, POWDER, LYOPHILIZED, FOR SOLUTION INTRAVENOUS at 02:07

## 2018-07-11 RX ADMIN — EPINEPHRINE 1 MG: 0.1 INJECTION, SOLUTION ENDOTRACHEAL; INTRACARDIAC; INTRAVENOUS at 02:07

## 2018-07-11 RX ADMIN — PROPOFOL 5 MCG/KG/MIN: 10 INJECTION, EMULSION INTRAVENOUS at 03:07

## 2018-07-11 RX ADMIN — FAMOTIDINE 20 MG: 20 INJECTION, SOLUTION INTRAVENOUS at 06:07

## 2018-07-11 RX ADMIN — ATROPINE SULFATE 1 MG: 0.1 INJECTION, SOLUTION ENDOTRACHEAL; INTRAMUSCULAR; INTRAVENOUS; SUBCUTANEOUS at 02:07

## 2018-07-11 RX ADMIN — IOHEXOL 100 ML: 350 INJECTION, SOLUTION INTRAVENOUS at 05:07

## 2018-07-11 RX ADMIN — CALCIUM CHLORIDE 1 G: 100 INJECTION, SOLUTION INTRAVENOUS; INTRAVENTRICULAR at 02:07

## 2018-07-11 RX ADMIN — SODIUM BICARBONATE 50 MEQ: 84 INJECTION, SOLUTION INTRAVENOUS at 02:07

## 2018-07-11 RX ADMIN — VANCOMYCIN HYDROCHLORIDE 1000 MG: 1 INJECTION, POWDER, LYOPHILIZED, FOR SOLUTION INTRAVENOUS at 07:07

## 2018-07-11 RX ADMIN — HEPARIN SODIUM 5000 UNITS: 5000 INJECTION, SOLUTION INTRAVENOUS; SUBCUTANEOUS at 10:07

## 2018-07-11 NOTE — ED PROVIDER NOTES
SCRIBE #1 NOTE: I, Corinne Mack, am scribing for, and in the presence of, Ricardo Blake DO. I have scribed the entire note.      History      Chief Complaint   Patient presents with    Fatigue     EMS reports that patient called from dialysis reporting that she wasnt feeling well with general malaise and skin tear to the left toe area        Review of patient's allergies indicates:   Allergen Reactions    Tylenol [acetaminophen] Other (See Comments)     Headache        HPI   HPI    2018, 11:51 AM   History obtained from EMS      History of Present Illness: Talia Mcfarland is a 68 y.o. female patient with PMHx of CHF, DM, DVT, ESRD, gastroparesis, and HTN who was sent to the Emergency Department from the dialysis clinic for AMS which onset gradually today after the pt completed dialysis. Per EMS, pt called from the clinic stating that she was not feeling well. Pt c/o malaise and a painful wound to the L foot, but is not communicative in the ED. Symptoms are constant and moderate in severity. No prior Tx reported. HPI and ROS limited secondary to pt AMS.        Arrival mode: EMS    PCP: Tank Castro MD       Past Medical History:  Past Medical History:   Diagnosis Date    Anemia     CHF (congestive heart failure)     Diabetes mellitus     DVT (deep venous thrombosis)     Left leg    ESRD on hemodialysis 2 years     Gastroparesis     Hx of diabetic gastroparesis 3/18/2015    Hypertension     Type II or unspecified type diabetes mellitus with renal manifestations, not stated as uncontrolled(250.40) 30 years     Urinary tract infection due to extended-spectrum beta lactamase (ESBL)-producing Klebsiella        Past Surgical History:  Past Surgical History:   Procedure Laterality Date     SECTION      CHOLECYSTECTOMY      KUSHAL Bilateral     LBP severe with multiple KUSHAL     ROTATOR CUFF REPAIR      UMBILICAL HERNIA REPAIR      VEIN BYPASS SURGERY Left          Family History:  Family  History   Problem Relation Age of Onset    Kidney disease Neg Hx        Social History:  Social History     Social History Main Topics    Smoking status: Never Smoker    Smokeless tobacco: Never Used    Alcohol use No    Drug use: No    Sexual activity: Not on file       ROS   Review of Systems   Unable to perform ROS: Mental status change   Constitutional:        (+) generalized malaise   Skin: Positive for wound (L foot).   Psychiatric/Behavioral: Positive for confusion.     Physical Exam      Initial Vitals   BP Pulse Resp Temp SpO2   07/11/18 1159 07/11/18 1159 07/11/18 1159 07/11/18 1148 --   115/80 91 (!) 25 99.8 °F (37.7 °C)       MAP       --                 Physical Exam  Nursing Notes and Vital Signs Reviewed.  Constitutional: Patient is in no apparent distress. Well-developed and well-nourished.  Head: Atraumatic. Normocephalic.  Eyes: PERRL. EOM intact. Conjunctivae are not pale. No scleral icterus.  Neck: Supple. Full ROM.   Cardiovascular: Regular rate. Regular rhythm. 4/6 systolic murmur. No rubs or gallops. Distal pulses are 2+ and symmetric.  Pulmonary/Chest: No respiratory distress. Mild course breath sounds bilaterally. No wheezing or rales.  Abdominal: Soft and non-distended.  There is no tenderness.  No rebound, guarding, or rigidity.   Musculoskeletal: Moves all extremities. No obvious deformities. BLE edema that is greater on the L than the R. Shunt to the LUE with good bruit.  Skin: Warm and dry. Wound and skin break to the dorsum of the L foot.  Neurological:  Arouses to voice. AMS.    ED Course    Intubation  Date/Time: 7/11/2018 2:26 PM  Performed by: ANGELIA BENITEZ  Authorized by: ANGEILA BENITEZ   Consent Done: Emergent Situation  Indications: respiratory failure  Intubation method: direct  Preoxygenation: bag valve mask  Pretreatment medications: none  Sedatives: see MAR for details  Paralytic: none  Tube size: 7.5 mm  Tube type: cuffed  Number of attempts: 1  Ventilation between  attempts: BVM  Cricoid pressure: yes  Cords visualized: yes  Post-procedure assessment: chest rise  Breath sounds: equal  Cuff inflated: yes  ETT to teeth: 24 cm  Tube secured with: ETT carvalho  Chest x-ray interpreted by radiologist.  Chest x-ray findings: endotracheal tube in appropriate position  Patient tolerance: Patient tolerated the procedure well with no immediate complications  Complications: No  Specimens: No  Implants: No    Critical Care  Date/Time: 7/11/2018 2:47 PM  Performed by: ANGELIA BENITEZ  Authorized by: ANGELIA BENITEZ   Direct patient critical care time: 20 minutes  Additional history critical care time: 4 minutes  Ordering / reviewing critical care time: 5 minutes  Documentation critical care time: 10 minutes  Consulting other physicians critical care time: 5 minutes  Consult with family critical care time: 3 minutes  Total critical care time (exclusive of procedural time) : 47 minutes  Critical care time was exclusive of separately billable procedures and treating other patients and teaching time.  Critical care was necessary to treat or prevent imminent or life-threatening deterioration of the following conditions: cardiac failure and respiratory failure.  Critical care was time spent personally by me on the following activities: blood draw for specimens, development of treatment plan with patient or surrogate, discussions with consultants, discussions with primary provider, interpretation of cardiac output measurements, evaluation of patient's response to treatment, examination of patient, obtaining history from patient or surrogate, ordering and review of laboratory studies, ordering and performing treatments and interventions, ordering and review of radiographic studies, pulse oximetry, re-evaluation of patient's condition, review of old charts and ventilator management.        ED Vital Signs:  Vitals:    07/11/18 1148 07/11/18 1155 07/11/18 1159 07/11/18 1211   BP:   115/80    Pulse:   91  92   Resp:   (!) 25    Temp: 99.8 °F (37.7 °C)      TempSrc: Oral      Weight:  55.1 kg (121 lb 6.4 oz)      07/11/18 1320 07/11/18 1405   BP: 132/63 (!) 118/59   Pulse: 88 81   Resp: 20 18   Temp:     TempSrc:     Weight:         Abnormal Lab Results:  Labs Reviewed   CBC W/ AUTO DIFFERENTIAL - Abnormal; Notable for the following:        Result Value    WBC 15.10 (*)     MCH 24.3 (*)     MCHC 29.6 (*)     RDW 19.7 (*)     Gran # (ANC) 13.4 (*)     Lymph # 0.8 (*)     Gran% 88.9 (*)     Lymph% 5.6 (*)     All other components within normal limits   COMPREHENSIVE METABOLIC PANEL - Abnormal; Notable for the following:     Potassium 5.4 (*)     Creatinine 3.6 (*)     Albumin 2.9 (*)     Anion Gap 17 (*)     eGFR if  14 (*)     eGFR if non  12 (*)     All other components within normal limits   TROPONIN I - Abnormal; Notable for the following:     Troponin I 0.039 (*)     All other components within normal limits   B-TYPE NATRIURETIC PEPTIDE - Abnormal; Notable for the following:     BNP >4,900 (*)     All other components within normal limits   PROCALCITONIN - Abnormal; Notable for the following:     Procalcitonin 2.77 (*)     All other components within normal limits   POCT GLUCOSE MONITORING CONTINUOUS - Normal   CULTURE, BLOOD   CULTURE, BLOOD   TSH   LACTIC ACID, PLASMA   POCT GLUCOSE        All Lab Results:  Results for orders placed or performed during the hospital encounter of 07/11/18   CBC auto differential   Result Value Ref Range    WBC 15.10 (H) 3.90 - 12.70 K/uL    RBC 4.93 4.00 - 5.40 M/uL    Hemoglobin 12.0 12.0 - 16.0 g/dL    Hematocrit 40.6 37.0 - 48.5 %    MCV 82 82 - 98 fL    MCH 24.3 (L) 27.0 - 31.0 pg    MCHC 29.6 (L) 32.0 - 36.0 g/dL    RDW 19.7 (H) 11.5 - 14.5 %    Platelets 165 150 - 350 K/uL    MPV SEE COMMENT 9.2 - 12.9 fL    Gran # (ANC) 13.4 (H) 1.8 - 7.7 K/uL    Lymph # 0.8 (L) 1.0 - 4.8 K/uL    Mono # 0.8 0.3 - 1.0 K/uL    Eos # 0.0 0.0 - 0.5 K/uL    Baso #  0.02 0.00 - 0.20 K/uL    Gran% 88.9 (H) 38.0 - 73.0 %    Lymph% 5.6 (L) 18.0 - 48.0 %    Mono% 5.6 4.0 - 15.0 %    Eosinophil% 0.1 0.0 - 8.0 %    Basophil% 0.1 0.0 - 1.9 %    Platelet Estimate Appears normal     Aniso Slight     Poik Slight     Poly Occasional     Hypo Moderate     Ovalocytes Occasional     Target Cells Occasional     Tear Drop Cells Occasional     Stomatocytes Present     Spherocytes Occasional     Schistocytes Present     Large/Giant Platelets Present     Differential Method Automated    Comprehensive metabolic panel   Result Value Ref Range    Sodium 137 136 - 145 mmol/L    Potassium 5.4 (H) 3.5 - 5.1 mmol/L    Chloride 95 95 - 110 mmol/L    CO2 25 23 - 29 mmol/L    Glucose 76 70 - 110 mg/dL    BUN, Bld 20 8 - 23 mg/dL    Creatinine 3.6 (H) 0.5 - 1.4 mg/dL    Calcium 9.8 8.7 - 10.5 mg/dL    Total Protein 8.1 6.0 - 8.4 g/dL    Albumin 2.9 (L) 3.5 - 5.2 g/dL    Total Bilirubin 0.6 0.1 - 1.0 mg/dL    Alkaline Phosphatase 76 55 - 135 U/L    AST 17 10 - 40 U/L    ALT 11 10 - 44 U/L    Anion Gap 17 (H) 8 - 16 mmol/L    eGFR if African American 14 (A) >60 mL/min/1.73 m^2    eGFR if non African American 12 (A) >60 mL/min/1.73 m^2   Troponin I   Result Value Ref Range    Troponin I 0.039 (H) 0.000 - 0.026 ng/mL   TSH   Result Value Ref Range    TSH 1.353 0.400 - 4.000 uIU/mL   Brain natriuretic peptide   Result Value Ref Range    BNP >4,900 (H) 0 - 99 pg/mL   Lactic acid, plasma   Result Value Ref Range    Lactate (Lactic Acid) 2.2 0.5 - 2.2 mmol/L   Procalcitonin   Result Value Ref Range    Procalcitonin 2.77 (H) <0.25 ng/mL   POCT glucose   Result Value Ref Range    POC Glucose 97 70 - 110 MG/DL   POCT glucose   Result Value Ref Range    POCT Glucose 97 70 - 110 mg/dL       Imaging Results:  Imaging Results          X-Ray Chest AP Portable (In process)                X-Ray Foot Complete Left (Final result)  Result time 07/11/18 13:18:19    Final result by Vamshi Lynne MD (07/11/18 13:18:19)                  Impression:      Charcot arthropathy.  Superimposed osteomyelitis is not excluded.  Consider MRI correlation.      Electronically signed by: Vamshi Lynne MD  Date:    07/11/2018  Time:    13:18             Narrative:    EXAMINATION:  XR FOOT COMPLETE 3 VIEW LEFT    CLINICAL HISTORY:  Osteomyelitis, unspecified.   Pain.    TECHNIQUE:  3 views.    COMPARISON:  None    FINDINGS:  Severe osteopenia.  Extensive vascular calcifications.  Plantar subluxation of the midfoot.  Sclerosis and partial collapse of the middle cuneiform.  Extensive erosive changes throughout the midfoot.  Diffuse soft tissue swelling.  Prominent plantar spur.                               X-Ray Chest AP Portable (Final result)  Result time 07/11/18 13:15:44    Final result by Vamshi Lynne MD (07/11/18 13:15:44)                 Impression:      Left upper lobe pneumonia.  Recommend close follow-up after treatment.      Electronically signed by: Vamshi Lynne MD  Date:    07/11/2018  Time:    13:15             Narrative:    EXAMINATION:  XR CHEST AP PORTABLE    CLINICAL HISTORY:  cough;    TECHNIQUE:  AP view of the chest was performed.    COMPARISON:  October 2, 2017    FINDINGS:  Left upper lobe consolidation.    Stable cardiomegaly.  Aortic atherosclerosis.  No acute osseous findings demonstrated.                               US Lower Extremity Veins Bilateral (Final result)  Result time 07/11/18 13:15:04    Final result by Vamshi Lynne MD (07/11/18 13:15:04)                 Impression:      Negative for bilateral lower extremity DVT.  Limited visualization of the left calf veins.      Electronically signed by: Vamshi Lynne MD  Date:    07/11/2018  Time:    13:15             Narrative:    EXAMINATION:  US LOWER EXTREMITY VEINS BILATERAL    CLINICAL HISTORY:  Edema, unspecified    FINDINGS:  Grayscale and color Doppler sonography was formed through the bilateral lower extremities.    The visualized bilateral lower extremity veins are widely  patent with normal augmentation, and compressibility and respiratory variability.  The left leg and calf veins were not visualized secondary to use marked soft tissue edema and skin thickening.  No evidence of fluid collection.                               CT Head Without Contrast (Final result)  Result time 07/11/18 12:18:25    Final result by Benedict Henderson MD (07/11/18 12:18:25)                 Impression:      Chronic microvascular ischemic changes.      Electronically signed by: Benedict Henderson MD  Date:    07/11/2018  Time:    12:18             Narrative:    EXAMINATION:  CT HEAD WITHOUT CONTRAST    CLINICAL HISTORY:  ams;    TECHNIQUE:  Low dose axial CT images obtained throughout the head without intravenous contrast. Sagittal and coronal reconstructions were performed.  All CT scans at this facility use dose modulation, iterative reconstruction and/or weight based dosing when appropriate to reduce radiation dose to as low as reasonably achievable.    COMPARISON:  03/14/2016    FINDINGS:  Intracranial compartment:    The brain parenchyma demonstrates areas of decreased attenuation with mild to moderate periventricular white matter consistent with chronic microvascular ischemic changes..  No parenchymal mass, hemorrhage, edema or major vascular distribution infarct.  Vascular calcifications are noted.    Mild prominence of the sulci and ventricles are consistent with age-related involutional changes.    No extra-axial blood or fluid collections.    Skull/extracranial contents (limited evaluation): No fracture. Mastoid air cells and paranasal sinuses are essentially clear.                                 The EKG was ordered, reviewed, and independently interpreted by the ED provider.  Interpretation time: 1227  Rate: 93 BPM  Rhythm: normal sinus rhythm  Interpretation: Baseline artifact. Nonspecific ST and T wave abnormalities. IVCD. No STEMI.    The EKG was ordered, reviewed, and independently interpreted by  the ED provider.  Interpretation time: 1436  Rate: 132 BPM  Rhythm: sinus tachycardia  Interpretation: IVCD. Nonspecific ST and T wave abnormalities. No STEMI.           The Emergency Provider reviewed the vital signs and test results, which are outlined above.    ED Discussion     1:33 PM: Re-evaluated pt. Pt is resting comfortably and is in no acute distress.  D/w pt all pertinent results. D/w pt any concerns expressed at this time. Answered all questions. Pt expresses understanding at this time.    2:25 PM: Pt moved to bed 4.    2:26 PM: Pt intubated.    2:27 PM: Pt has no pulse. Compressions started.     2:28 PM: Compressions stopped. Epi pushed. Bicarb pushed. Compressions resumed.    2:29 PM: Compressions stopped. Pulse check. Atropine pushed.    2:30 PM: No pulse appreciated. Compressions resumed.    2:31 PM: Epi pushed. Calcium pushed.    2:32 PM: Compressions stopped. Pulse check. Pulse appreciated. Pt is tachycardic.    2:42 PM: Discussed case with Deborah Angelo NP ([facility]). Dr. England agrees with current care and management of pt and accepts admission.   Admitting Service: Hospital medicine   Admitting Physician: Dr. England  Admit to: ICU    2:48 PM: Pt's family is unaware of what the pt's code status is. Pt's family made aware of pt condition. I have discussed test results, shared treatment plan, and the need for admission with patient's family at bedside. Pt's family expresses understanding at this time and agrees with all information. All questions answered. Pt's family has no further questions or concerns at this time. Pt is ready for admit.      ED Medication(s):  Medications   DOPamine 400 mg in dextrose 5 % 250 mL infusion (premix) (10 mcg/kg/min × 55.1 kg Intravenous New Bag 7/11/18 1440)   piperacillin-tazobactam 4.5 g in dextrose 5 % 100 mL IVPB (ready to mix system) (0 g Intravenous Stopped 7/11/18 1438)       New Prescriptions    No medications on file             Medical Decision  Making    Medical Decision Making:   Clinical Tests:   Lab Tests: Ordered and Reviewed  Radiological Study: Ordered and Reviewed  Medical Tests: Ordered and Reviewed           Scribe Attestation:   Scribe #1: I performed the above scribed service and the documentation accurately describes the services I performed. I attest to the accuracy of the note.    Attending:   Physician Attestation Statement for Scribe #1: I, Ricardo Blake DO, personally performed the services described in this documentation, as scribed by Corinne Mack, in my presence, and it is both accurate and complete.          Clinical Impression       ICD-10-CM ICD-9-CM   1. Respiratory arrest R09.2 799.1   2. Altered mental status R41.82 780.97   3. Swelling R60.9 782.3   4. Osteomyelitis M86.9 730.20   5. Chest pain R07.9 786.50   6. Altered mental status, unspecified altered mental status type R41.82 780.97       Disposition:   Disposition: Admitted  Condition: Critical           Ricardo Blake DO  07/11/18 1509

## 2018-07-11 NOTE — PROGRESS NOTES
Patient arrived in er bed 4 with no spontaneous respirations. MD at bedside. Patient bagged via Ambu bag with mask at 100% fio2. No pulse noted, CPR initiated and patient intubated per MD without difficulty. Pulse noted and patient placed on  Ventilator.RT to continue to monitor.

## 2018-07-11 NOTE — CONSULTS
[facility] -   Cardiology  Consult Note    Patient Name: Talia Mcfarland  MRN: [id number]  Admission Date: 2018  Hospital Length of Stay: 0 days  Code Status: Prior   Attending Provider: Ricardo Blake DO   Consulting Provider: Tanisha Trammell Md, MD  Primary Care Physician: Tank Castro MD  Principal Problem:<principal problem not specified>    Patient information was obtained from past medical records and ER records.     Inpatient consult to Cardiology  Consult performed by: TANISHA TRAMMELL  Consult ordered by: CHIN MCMAHON  Reason for consult: cardiac arrest         Subjective:     Chief Complaint:  Malaise     HPI:   Talia Mcfarland is a 68 y.o. female patient with  DM, DVT, ESRD, gastroparesis, and HTN who was sent to the Emergency Department from the dialysis clinic for AMS which onset gradually today after the pt completed dialysis. Per EMS, pt called from the clinic stating that she was not feeling well. Pt c/o malaise and a painful wound to the L foot. Information obtained from chart. Pt became apneic/agonal breathing and had lost pulse and required CPR for PEA cardiac arrest and ultimately achieved ROSC after epi/bicarb given. Pt had 2D ECHO with normal LV function but dilated RV and dec function, will be going to CT to r/o PE. Pt intubated/sedated, family at bedside discussed case.     Past Medical History:   Diagnosis Date    Anemia     CHF (congestive heart failure)     Diabetes mellitus     DVT (deep venous thrombosis)     Left leg    ESRD on hemodialysis 2 years     Gastroparesis     Hx of diabetic gastroparesis 3/18/2015    Hypertension     Type II or unspecified type diabetes mellitus with renal manifestations, not stated as uncontrolled(250.40) 30 years     Urinary tract infection due to extended-spectrum beta lactamase (ESBL)-producing Klebsiella        Past Surgical History:   Procedure Laterality Date     SECTION      CHOLECYSTECTOMY      KUSHAL  Bilateral     LBP severe with multiple KUSHAL     ROTATOR CUFF REPAIR      UMBILICAL HERNIA REPAIR      VEIN BYPASS SURGERY Left        Review of patient's allergies indicates:   Allergen Reactions    Tylenol [acetaminophen] Other (See Comments)     Headache       Current Facility-Administered Medications on File Prior to Encounter   Medication    denosumab (PROLIA) injection 60 mg     Current Outpatient Prescriptions on File Prior to Encounter   Medication Sig    amLODIPine (NORVASC) 5 MG tablet TAKE 1 TABLET BY MOUTH DAILY    aspirin (ECOTRIN) 81 MG EC tablet Take 81 mg by mouth once daily.    carvedilol (COREG) 25 MG tablet Take 25 mg by mouth 2 (two) times daily with meals.    hydrocodone-acetaminophen 5-325mg (NORCO) 5-325 mg per tablet Take 1 tablet by mouth 2 (two) times daily as needed for Pain.    levothyroxine (SYNTHROID) 25 MCG tablet Take 25 mcg by mouth once daily.    lidocaine-prilocaine (EMLA) cream Apply topically as needed. APPLY TO TREATMENT AREA 1 HOUR PRIOR TO TREATMENT UTD    pantoprazole (PROTONIX) 20 MG tablet Take 1 tablet (20 mg total) by mouth once daily.    promethazine (PHENERGAN) 25 MG tablet Take 1 tablet (25 mg total) by mouth every 6 (six) hours as needed for Nausea.    ropinirole (REQUIP) 0.5 MG tablet Take 1 tablet (0.5 mg total) by mouth 3 (three) times daily.     Family History     None        Social History Main Topics    Smoking status: Never Smoker    Smokeless tobacco: Never Used    Alcohol use No    Drug use: No    Sexual activity: Not on file     Review of Systems   Unable to perform ROS: intubated     Objective:     Vital Signs (Most Recent):  Temp: 99.8 °F (37.7 °C) (07/11/18 1148)  Pulse: 87 (07/11/18 1731)  Resp: (!) 22 (07/11/18 1731)  BP: (!) 144/66 (07/11/18 1731)  SpO2: 100 % (07/11/18 1731) Vital Signs (24h Range):  Temp:  [99.8 °F (37.7 °C)] 99.8 °F (37.7 °C)  Pulse:  [] 87  Resp:  [18-26] 22  SpO2:  [94 %-100 %] 100 %  BP: (115-144)/(57-80)  144/66     Weight: 55.1 kg (121 lb 6.4 oz)  Body mass index is 24.52 kg/m².    SpO2: 100 %  O2 Device (Oxygen Therapy): ventilator    No intake or output data in the 24 hours ending 07/11/18 1738    Lines/Drains/Airways     Drain                 Urethral Catheter 07/11/18 1445 Latex 16 Fr. less than 1 day          Airway                 Airway - Non-Surgical 07/11/18 1426 Endotracheal Tube less than 1 day          Peripheral Intravenous Line                 Peripheral IV - Single Lumen 07/11/18 1240 Right Forearm less than 1 day         Peripheral IV - Single Lumen 07/11/18 1438 Right  less than 1 day                Physical Exam   Constitutional: She is oriented to person, place, and time. She appears well-developed and well-nourished. No distress.   HENT:   Head: Normocephalic and atraumatic.   Nose: Nose normal.   Mouth/Throat: Oropharynx is clear and moist.   Eyes: Conjunctivae and EOM are normal. No scleral icterus.   Neck: Normal range of motion. Neck supple. No JVD present. No thyromegaly present.   Cardiovascular: Normal rate, regular rhythm, S1 normal and S2 normal.  Exam reveals no gallop, no S3, no S4 and no friction rub.    Murmur heard.  Pulmonary/Chest: Effort normal and breath sounds normal. No stridor. No respiratory distress. She has no wheezes. She has no rales. She exhibits no tenderness.   Abdominal: Soft. Bowel sounds are normal. She exhibits no distension and no mass. There is no tenderness. There is no rebound.   Genitourinary:   Genitourinary Comments: Deferred   Musculoskeletal: Normal range of motion. She exhibits no edema, tenderness or deformity.   Lymphadenopathy:     She has no cervical adenopathy.   Neurological: She is alert and oriented to person, place, and time. She exhibits normal muscle tone. Coordination normal.   Skin: Skin is warm and dry. No rash noted. She is not diaphoretic. No erythema. No pallor.   Psychiatric: She has a normal mood and affect. Her behavior is normal.  Judgment and thought content normal.   Nursing note and vitals reviewed.      Significant Labs:   All pertinent lab results from the last 24 hours have been reviewed. and   Recent Lab Results       07/11/18  1640 07/11/18  1536 07/11/18  1511 07/11/18  1245 07/11/18  1240      Procalcitonin     2.77  Comment:  Please re-baseline procalcitonin if a patient is transferred from  other facilities to [facility].  A concentration < 0.25 ng/mL represents a low risk bacterial   infection.  Procalcitonin may not be accurate among patients with localized   infection, recent trauma or major surgery, immunosuppressed state,   invasive fungal infection, renal dysfunction. Decisions regarding   initiation or continuation of antibiotic therapy should not be based   solely on procalcitonin levels.  (H)     Albumin  2.6(L)   2.9(L)     Alkaline Phosphatase  105   76     Allens Test   N/A       ALT  43   11     Anion Gap  17(H)   17(H)     Aniso     Slight     AST  75(H)   17     Aortic Valve Stenosis MODERATE(A)         Baso #  0.01   0.02     Basophil%  0.1   0.1     Total Bilirubin  0.8  Comment:  For infants and newborns, interpretation of results should be based  on gestational age, weight and in agreement with clinical  observations.  Premature Infant recommended reference ranges:  Up to 24 hours.............<8.0 mg/dL  Up to 48 hours............<12.0 mg/dL  3-5 days..................<15.0 mg/dL  6-29 days.................<15.0 mg/dL     0.6  Comment:  For infants and newborns, interpretation of results should be based  on gestational age, weight and in agreement with clinical  observations.  Premature Infant recommended reference ranges:  Up to 24 hours.............<8.0 mg/dL  Up to 48 hours............<12.0 mg/dL  3-5 days..................<15.0 mg/dL  6-29 days.................<15.0 mg/dL       BNP     >4,900  Comment:  Values of less than 100 pg/ml are consistent with non-CHF populations.(H)     Site   RR       BUN,  Bld  24(H)   20     Calcium  10.5   9.8     Chloride  97   95     CO2  25   25     Creatinine  3.8(H)   3.6(H)     DelSys   Adult Vent       Differential Method  Automated   Automated     EF 60         eGFR if   13(A)   14(A)     eGFR if non   12  Comment:  Calculation used to obtain the estimated glomerular filtration  rate (eGFR) is the CKD-EPI equation.   (A)   12  Comment:  Calculation used to obtain the estimated glomerular filtration  rate (eGFR) is the CKD-EPI equation.   (A)     Eos #  0.0   0.0     Eosinophil%  0.0   0.1     FiO2   100       Large/Giant Platelets     Present     Glucose  99   76     Gran # (ANC)  11.1(H)   13.4(H)     Gran%  94.3(H)   88.9(H)     Hematocrit  37.0   40.6     Hemoglobin  10.9(L)   12.0     Hypo     Moderate     Lactate, Yasir  3.3  Comment:  Falsely low lactic acid results can be found in samples   containing >=13.0 mg/dL total bilirubin and/or >=3.5 mg/dL   direct bilirubin.  (H)   2.2  Comment:  Falsely low lactic acid results can be found in samples   containing >=13.0 mg/dL total bilirubin and/or >=3.5 mg/dL   direct bilirubin.       Lymph #  0.4(L)   0.8(L)     Lymph%  3.7(L)   5.6(L)     MCH  24.2(L)   24.3(L)     MCHC  29.5(L)   29.6(L)     MCV  82   82     Mode   AC/PRVC       Mono #  0.2(L)   0.8     Mono%  1.9(L)   5.6     MPV  9.3   SEE COMMENT  Comment:  Result not available.     Mitral Valve Mobility MILDLY RESTRICTED         Ovalocytes     Occasional     Est. PA Systolic Pressure 59.09(A)         PEEP   5       Platelet Estimate     Appears normal     Platelets  165   165     POC BE   7       POC Glucose    97      POC HCO3   31.7(H)       POC PCO2   49.3(H)       POC PH   7.416       POC PO2   138(H)       POC SATURATED O2   99       POCT Glucose          Poik     Slight     Poly     Occasional     Potassium  4.2   5.4(H)     Total Protein  6.7   8.1     Rate   22       RBC  4.51   4.93     RDW  19.2(H)   19.7(H)     Sample    ARTERIAL       Schistocytes     Present     Sodium  139   137     Spherocytes     Occasional     Stomatocytes     Present     Target Cells     Occasional     Tear Drop Cells     Occasional     Troponin I     0.039  Comment:  The reference interval for Troponin I represents the 99th percentile   cutoff   for our facility and is consistent with 3rd generation assay   performance.  (H)     TSH     1.353     Tricuspid Valve Regurgitation MILD TO MODERATE         Vt   300       WBC  11.76   15.10(H)                 07/11/18  1239      Procalcitonin      Albumin      Alkaline Phosphatase      Allens Test      ALT      Anion Gap      Aniso      AST      Aortic Valve Stenosis      Baso #      Basophil%      Total Bilirubin      BNP      Site      BUN, Bld      Calcium      Chloride      CO2      Creatinine      DelSys      Differential Method      EF      eGFR if       eGFR if non       Eos #      Eosinophil%      FiO2      Large/Giant Platelets      Glucose      Gran # (ANC)      Gran%      Hematocrit      Hemoglobin      Hypo      Lactate, Yasir      Lymph #      Lymph%      MCH      MCHC      MCV      Mode      Mono #      Mono%      MPV      Mitral Valve Mobility      Ovalocytes      Est. PA Systolic Pressure      PEEP      Platelet Estimate      Platelets      POC BE      POC Glucose      POC HCO3      POC PCO2      POC PH      POC PO2      POC SATURATED O2      POCT Glucose 97     Poik      Poly      Potassium      Total Protein      Rate      RBC      RDW      Sample      Schistocytes      Sodium      Spherocytes      Stomatocytes      Target Cells      Tear Drop Cells      Troponin I      TSH      Tricuspid Valve Regurgitation      Vt      WBC            Significant Imaging: Echocardiogram:   2D echo with color flow doppler:   Results for orders placed or performed during the hospital encounter of 07/11/18   2D echo with color flow doppler   Result Value Ref Range    EF 60 55 - 65     Aortic Valve Stenosis MODERATE (A)     Est. PA Systolic Pressure 59.09 (A)     Mitral Valve Mobility MILDLY RESTRICTED     Tricuspid Valve Regurgitation MILD TO MODERATE     and X-Ray: CXR: X-Ray Chest 1 View (CXR): No results found for this visit on 07/11/18.    Assessment and Plan:     Respiratory arrest    For CTA to r/o PE  Pt also has PNA on CXR leading to hypoxic arrest          Elevated troponin    In setting of PEA cardiac arrest  Trend enzymes  Will discuss ishcemic workup once stable  2D ECHO without WMA        ESRD on hemodialysis    Cont HD per nephrology             VTE Risk Mitigation     None          Thank you for your consult. I will follow-up with patient. Please contact us if you have any additional questions.    Manny Avina Md, MD  Cardiology   [facility] -

## 2018-07-11 NOTE — SUBJECTIVE & OBJECTIVE
Past Medical History:   Diagnosis Date    Anemia     CHF (congestive heart failure)     Diabetes mellitus     DVT (deep venous thrombosis)     Left leg    ESRD on hemodialysis 2 years     Gastroparesis     Hx of diabetic gastroparesis 3/18/2015    Hypertension     Type II or unspecified type diabetes mellitus with renal manifestations, not stated as uncontrolled(250.40) 30 years     Urinary tract infection due to extended-spectrum beta lactamase (ESBL)-producing Klebsiella        Past Surgical History:   Procedure Laterality Date     SECTION      CHOLECYSTECTOMY      KUSHAL Bilateral     LBP severe with multiple KUSHAL     ROTATOR CUFF REPAIR      UMBILICAL HERNIA REPAIR      VEIN BYPASS SURGERY Left        Review of patient's allergies indicates:   Allergen Reactions    Tylenol [acetaminophen] Other (See Comments)     Headache     Current Facility-Administered Medications   Medication Frequency    denosumab (PROLIA) injection 60 mg Q6 Months    dextrose 5 % and 0.9 % NaCl infusion Continuous    dextrose 50% injection 12.5 g PRN    famotidine IVPB 20 mg Daily    glucagon (human recombinant) injection 1 mg PRN    heparin (porcine) injection 5,000 Units Q8H    insulin aspart U-100 pen 0-5 Units Q6H PRN    [START ON 2018] piperacillin-tazobactam 4.5 g in dextrose 5 % 100 mL IVPB (ready to mix system) Q12H    propofol (DIPRIVAN) 10 mg/mL infusion Continuous    vancomycin 1 g in dextrose 5 % 250 mL IVPB (ready to mix system) Once     Current Outpatient Prescriptions   Medication    amLODIPine (NORVASC) 5 MG tablet    aspirin (ECOTRIN) 81 MG EC tablet    carvedilol (COREG) 25 MG tablet    hydrocodone-acetaminophen 5-325mg (NORCO) 5-325 mg per tablet    levothyroxine (SYNTHROID) 25 MCG tablet    lidocaine-prilocaine (EMLA) cream    pantoprazole (PROTONIX) 20 MG tablet    promethazine (PHENERGAN) 25 MG tablet    ropinirole (REQUIP) 0.5 MG tablet     Family History     None         Social History Main Topics    Smoking status: Never Smoker    Smokeless tobacco: Never Used    Alcohol use No    Drug use: No    Sexual activity: Not on file     Review of Systems   Unable to perform ROS: Intubated     Objective:     Vital Signs (Most Recent):  Temp: (!) 100.8 °F (38.2 °C) (07/11/18 1738)  Pulse: 84 (07/11/18 1812)  Resp: (!) 23 (07/11/18 1812)  BP: 120/61 (07/11/18 1812)  SpO2: 99 % (07/11/18 1812)  O2 Device (Oxygen Therapy): ventilator (07/11/18 1551) Vital Signs (24h Range):  Temp:  [99.8 °F (37.7 °C)-100.8 °F (38.2 °C)] 100.8 °F (38.2 °C)  Pulse:  [] 84  Resp:  [18-26] 23  SpO2:  [94 %-100 %] 99 %  BP: (115-144)/(57-80) 120/61     Weight: 55.1 kg (121 lb 6.4 oz) (07/11/18 1155)  Body mass index is 24.52 kg/m².  Body surface area is 1.51 meters squared.    No intake/output data recorded.    Physical Exam   Constitutional: She appears well-developed. She appears distressed.   HENT:   Head: Normocephalic and atraumatic.   Mouth/Throat: Oropharynx is clear and moist. No oropharyngeal exudate.   ETT in place   Eyes: Conjunctivae are normal.   Neck: No JVD present. Carotid bruit is not present. No tracheal deviation present. No thyroid mass and no thyromegaly present.   Cardiovascular: Normal rate, regular rhythm and intact distal pulses.  Exam reveals no gallop and no friction rub.    Murmur heard.  Pulmonary/Chest: No respiratory distress. She has wheezes. She has rales. She exhibits no tenderness.   Abdominal: Soft. Bowel sounds are normal. She exhibits no distension, no abdominal bruit, no ascites and no mass. There is no hepatosplenomegaly. There is no tenderness. There is no rebound, no guarding and no CVA tenderness.   Musculoskeletal: She exhibits edema. She exhibits no tenderness.   Lymphadenopathy:     She has no cervical adenopathy.   Neurological: No cranial nerve deficit. She exhibits normal muscle tone. Coordination normal.   Skin: Skin is warm and intact. No rash noted.  No erythema. No pallor.       Significant Labs:    CBC:   Recent Labs  Lab 07/11/18  1536   WBC 11.76   RBC 4.51   HGB 10.9*   HCT 37.0      MCV 82   MCH 24.2*   MCHC 29.5*     CMP:   Recent Labs  Lab 07/11/18  1536   GLU 99   CALCIUM 10.5   ALBUMIN 2.6*   PROT 6.7      K 4.2   CO2 25   CL 97   BUN 24*   CREATININE 3.8*   ALKPHOS 105   ALT 43   AST 75*   BILITOT 0.8     Coagulation: No results for input(s): PT, INR, APTT in the last 168 hours.  LFTs:   Recent Labs  Lab 07/11/18  1536   ALT 43   AST 75*   ALKPHOS 105   BILITOT 0.8   PROT 6.7   ALBUMIN 2.6*     All labs within the past 24 hours have been reviewed.    Lab Results   Component Value Date    CREATININE 3.8 (H) 07/11/2018    BUN 24 (H) 07/11/2018     07/11/2018    K 4.2 07/11/2018    CL 97 07/11/2018    CO2 25 07/11/2018       Lab Results   Component Value Date    CALCIUM 10.5 07/11/2018    PHOS 4.2 12/23/2016       Lab Results   Component Value Date    ALBUMIN 2.6 (L) 07/11/2018         Significant Imaging: reviewed

## 2018-07-11 NOTE — PROGRESS NOTES
Pharmacist Renal Dose Adjustment Note    Talia Mcfarland is a 68 y.o. female being treated with the medication Pepcid    Patient Data:    Vital Signs (Most Recent):  Temp: (!) 100.8 °F (38.2 °C) (07/11/18 1738)  Pulse: 87 (07/11/18 1731)  Resp: (!) 22 (07/11/18 1731)  BP: (!) 144/66 (07/11/18 1731)  SpO2: 100 % (07/11/18 1731)   Vital Signs (72h Range):  Temp:  [99.8 °F (37.7 °C)-100.8 °F (38.2 °C)]   Pulse:  []   Resp:  [18-26]   BP: (115-144)/(57-80)   SpO2:  [94 %-100 %]        Recent Labs     Lab 07/11/18  1240 07/11/18  1536   CREATININE 3.6* 3.8*     Serum creatinine: 3.8 mg/dL (H) 07/11/18 1536  Estimated creatinine clearance: 12.3 mL/min (A)    Medication:Pepcid dose: 20mg frequency BId will be changed to medication:Pepcid dose:20mg frequency:QD    Pharmacist's Name: Deneen Kelly  Pharmacist's Extension: [phone]

## 2018-07-11 NOTE — ASSESSMENT & PLAN NOTE
- multilobar pneumonia on broad spectrum antibotics   - also possible oste in toe will order mri once patient stable  - continue with antibotics and follow up on cultures

## 2018-07-11 NOTE — H&P
[facility] Hospital Medicine  History & Physical    Patient Name: Talia Mcfarland  MRN: [id number]  Admission Date: 7/11/2018  Attending Physician: Zion England MD   Primary Care Provider: Tank Castro MD         Patient information was obtained from past medical records and ER records.     Subjective:     Principal Problem:HAP (hospital-acquired pneumonia)    Chief Complaint:   Chief Complaint   Patient presents with    Fatigue     EMS reports that patient called from dialysis reporting that she wasnt feeling well with general malaise and skin tear to the left toe area         HPI: Talia Mcfarland is a 66 y.o. female patient with PMHx of ESRD on HD, DM2, HTN, PVD, who presented to the ER from dialysis center with alert mental status . At dialysis center patient was receiving  First dose of vancomycin  For possible infection . She was confused so was transferred to ed . History obtained from electronic records no family bedside . 13 30 pm patient was seen by ed physician with confusion but awake . Later around 14 25 she was found not responsive bradycardiac and show breathing weak pulse was moved to ed room 4 from room 14 . Was intubated and after that was found to have no pulse(pea) cpr was started with two rounds , two epi,one atropine / calcium and bicarb  . After ROSc ekg done was with no acute changes .  . Not following commands but randomly moving extremities and opening eyes . Stat echo was done which showed right heart strain with possibility of PE . So cta was ordered . Also started on broad spectrum antibotic culture drawn     Past Medical History:   Diagnosis Date    Anemia     CHF (congestive heart failure)     Diabetes mellitus     DVT (deep venous thrombosis)     Left leg    ESRD on hemodialysis 2 years 2012    Gastroparesis     Hx of diabetic gastroparesis 3/18/2015    Hypertension     Type II or unspecified type diabetes mellitus with renal manifestations, not stated  as uncontrolled(250.40) 30 years     Urinary tract infection due to extended-spectrum beta lactamase (ESBL)-producing Klebsiella        Past Surgical History:   Procedure Laterality Date     SECTION      CHOLECYSTECTOMY      KUSHAL Bilateral     LBP severe with multiple KUSHAL     ROTATOR CUFF REPAIR      UMBILICAL HERNIA REPAIR      VEIN BYPASS SURGERY Left        Review of patient's allergies indicates:   Allergen Reactions    Tylenol [acetaminophen] Other (See Comments)     Headache       Current Facility-Administered Medications on File Prior to Encounter   Medication    denosumab (PROLIA) injection 60 mg     Current Outpatient Prescriptions on File Prior to Encounter   Medication Sig    amLODIPine (NORVASC) 5 MG tablet TAKE 1 TABLET BY MOUTH DAILY    aspirin (ECOTRIN) 81 MG EC tablet Take 81 mg by mouth once daily.    carvedilol (COREG) 25 MG tablet Take 25 mg by mouth 2 (two) times daily with meals.    hydrocodone-acetaminophen 5-325mg (NORCO) 5-325 mg per tablet Take 1 tablet by mouth 2 (two) times daily as needed for Pain.    levothyroxine (SYNTHROID) 25 MCG tablet Take 25 mcg by mouth once daily.    lidocaine-prilocaine (EMLA) cream Apply topically as needed. APPLY TO TREATMENT AREA 1 HOUR PRIOR TO TREATMENT UTD    pantoprazole (PROTONIX) 20 MG tablet Take 1 tablet (20 mg total) by mouth once daily.    promethazine (PHENERGAN) 25 MG tablet Take 1 tablet (25 mg total) by mouth every 6 (six) hours as needed for Nausea.    ropinirole (REQUIP) 0.5 MG tablet Take 1 tablet (0.5 mg total) by mouth 3 (three) times daily.     Family History     Reviewed and not relevant         Social History Main Topics    Smoking status: Never Smoker    Smokeless tobacco: Never Used    Alcohol use No    Drug use: No    Sexual activity: Not on file     Review of Systems   Unable to perform ROS: Intubated     Objective:     Vital Signs (Most Recent):  Temp: (!) 100.8 °F (38.2 °C) (18 1738)  Pulse: 87  (07/11/18 1731)  Resp: (!) 22 (07/11/18 1731)  BP: (!) 144/66 (07/11/18 1731)  SpO2: 100 % (07/11/18 1731) Vital Signs (24h Range):  Temp:  [99.8 °F (37.7 °C)-100.8 °F (38.2 °C)] 100.8 °F (38.2 °C)  Pulse:  [] 87  Resp:  [18-26] 22  SpO2:  [94 %-100 %] 100 %  BP: (115-144)/(57-80) 144/66     Weight: 55.1 kg (121 lb 6.4 oz)  Body mass index is 24.52 kg/m².    Physical Exam   Constitutional: She appears well-developed and well-nourished. She is sedated and intubated.   HENT:   Head: Normocephalic and atraumatic.   Eyes: Right eye exhibits no discharge. No scleral icterus.   Neck: No JVD present.   Cardiovascular: Normal rate and regular rhythm.    Murmur heard.  Pulmonary/Chest: Effort normal. She is intubated. She has rales.   Abdominal: Soft. Bowel sounds are normal.   Musculoskeletal: Normal range of motion.   Lymphadenopathy:     She has no cervical adenopathy.   Neurological:   Intubated and sedated    Skin: Skin is warm and dry.   Psychiatric: She has a normal mood and affect.   Nursing note and vitals reviewed.          Significant Labs: All pertinent labs within the past 24 hours have been reviewed.    Significant Imaging: I have reviewed all pertinent imaging results/findings within the past 24 hours.    Assessment/Plan:     * HAP (hospital-acquired pneumonia)    -dialysis patient with multilobar pneumonia  - continue with broad spectrum antibotics   -follow up on cultures   - continue with ventilator support           DM type 2 (diabetes mellitus, type 2)    Continue with iss   And follow hba1c           Severe sepsis    - multilobar pneumonia on broad spectrum antibotics   - also possible oste in toe will order mri once patient stable  - continue with antibotics and follow up on cultures           Acute respiratory failure    -acute hyper capneic and hypoxic respiratory failure   - in setting of multilobar pneumonia   - continue with broad spectrum antibotics and follow up on cultures   - pe rule out  with cta          Cardiac arrest    -initial rhythm as per ed notes PEA  - two rounds of cpr with rosc   - echo with right heart strain and elevated PA and increase central vein pressure pe was rule out cta negative   - respiratory arrest in setting of pneumonia          Elevated troponin    - stress induced in setting of the sepsis           ESRD on hemodialysis    -nephrology on board   - case discussed with  nephrology         Type II or unspecified type diabetes mellitus with renal manifestations, not stated as uncontrolled(250.40)                VTE Risk Mitigation         Ordered     heparin (porcine) injection 5,000 Units  Every 8 hours      07/11/18 1811             Zayda England MD  Department of Hospital Medicine   [facility]

## 2018-07-11 NOTE — SUBJECTIVE & OBJECTIVE
Past Medical History:   Diagnosis Date    Anemia     CHF (congestive heart failure)     Diabetes mellitus     DVT (deep venous thrombosis)     Left leg    ESRD on hemodialysis 2 years     Gastroparesis     Hx of diabetic gastroparesis 3/18/2015    Hypertension     Type II or unspecified type diabetes mellitus with renal manifestations, not stated as uncontrolled(250.40) 30 years     Urinary tract infection due to extended-spectrum beta lactamase (ESBL)-producing Klebsiella        Past Surgical History:   Procedure Laterality Date     SECTION      CHOLECYSTECTOMY      KUSHAL Bilateral     LBP severe with multiple KUSHAL     ROTATOR CUFF REPAIR      UMBILICAL HERNIA REPAIR      VEIN BYPASS SURGERY Left        Review of patient's allergies indicates:   Allergen Reactions    Tylenol [acetaminophen] Other (See Comments)     Headache       Current Facility-Administered Medications on File Prior to Encounter   Medication    denosumab (PROLIA) injection 60 mg     Current Outpatient Prescriptions on File Prior to Encounter   Medication Sig    amLODIPine (NORVASC) 5 MG tablet TAKE 1 TABLET BY MOUTH DAILY    aspirin (ECOTRIN) 81 MG EC tablet Take 81 mg by mouth once daily.    carvedilol (COREG) 25 MG tablet Take 25 mg by mouth 2 (two) times daily with meals.    hydrocodone-acetaminophen 5-325mg (NORCO) 5-325 mg per tablet Take 1 tablet by mouth 2 (two) times daily as needed for Pain.    levothyroxine (SYNTHROID) 25 MCG tablet Take 25 mcg by mouth once daily.    lidocaine-prilocaine (EMLA) cream Apply topically as needed. APPLY TO TREATMENT AREA 1 HOUR PRIOR TO TREATMENT UTD    pantoprazole (PROTONIX) 20 MG tablet Take 1 tablet (20 mg total) by mouth once daily.    promethazine (PHENERGAN) 25 MG tablet Take 1 tablet (25 mg total) by mouth every 6 (six) hours as needed for Nausea.    ropinirole (REQUIP) 0.5 MG tablet Take 1 tablet (0.5 mg total) by mouth 3 (three) times daily.     Family History      None        Social History Main Topics    Smoking status: Never Smoker    Smokeless tobacco: Never Used    Alcohol use No    Drug use: No    Sexual activity: Not on file     Review of Systems   Unable to perform ROS: intubated     Objective:     Vital Signs (Most Recent):  Temp: 99.8 °F (37.7 °C) (07/11/18 1148)  Pulse: 87 (07/11/18 1731)  Resp: (!) 22 (07/11/18 1731)  BP: (!) 144/66 (07/11/18 1731)  SpO2: 100 % (07/11/18 1731) Vital Signs (24h Range):  Temp:  [99.8 °F (37.7 °C)] 99.8 °F (37.7 °C)  Pulse:  [] 87  Resp:  [18-26] 22  SpO2:  [94 %-100 %] 100 %  BP: (115-144)/(57-80) 144/66     Weight: 55.1 kg (121 lb 6.4 oz)  Body mass index is 24.52 kg/m².    SpO2: 100 %  O2 Device (Oxygen Therapy): ventilator    No intake or output data in the 24 hours ending 07/11/18 1738    Lines/Drains/Airways     Drain                 Urethral Catheter 07/11/18 1445 Latex 16 Fr. less than 1 day          Airway                 Airway - Non-Surgical 07/11/18 1426 Endotracheal Tube less than 1 day          Peripheral Intravenous Line                 Peripheral IV - Single Lumen 07/11/18 1240 Right Forearm less than 1 day         Peripheral IV - Single Lumen 07/11/18 1438 Right  less than 1 day                Physical Exam   Constitutional: She is oriented to person, place, and time. She appears well-developed and well-nourished. No distress.   HENT:   Head: Normocephalic and atraumatic.   Nose: Nose normal.   Mouth/Throat: Oropharynx is clear and moist.   Eyes: Conjunctivae and EOM are normal. No scleral icterus.   Neck: Normal range of motion. Neck supple. No JVD present. No thyromegaly present.   Cardiovascular: Normal rate, regular rhythm, S1 normal and S2 normal.  Exam reveals no gallop, no S3, no S4 and no friction rub.    Murmur heard.  Pulmonary/Chest: Effort normal and breath sounds normal. No stridor. No respiratory distress. She has no wheezes. She has no rales. She exhibits no tenderness.   Abdominal: Soft.  Bowel sounds are normal. She exhibits no distension and no mass. There is no tenderness. There is no rebound.   Genitourinary:   Genitourinary Comments: Deferred   Musculoskeletal: Normal range of motion. She exhibits no edema, tenderness or deformity.   Lymphadenopathy:     She has no cervical adenopathy.   Neurological: She is alert and oriented to person, place, and time. She exhibits normal muscle tone. Coordination normal.   Skin: Skin is warm and dry. No rash noted. She is not diaphoretic. No erythema. No pallor.   Psychiatric: She has a normal mood and affect. Her behavior is normal. Judgment and thought content normal.   Nursing note and vitals reviewed.      Significant Labs:   All pertinent lab results from the last 24 hours have been reviewed. and   Recent Lab Results       07/11/18  1640 07/11/18  1536 07/11/18  1511 07/11/18  1245 07/11/18  1240      Procalcitonin     2.77  Comment:  Please re-baseline procalcitonin if a patient is transferred from  other facilities to [facility].  A concentration < 0.25 ng/mL represents a low risk bacterial   infection.  Procalcitonin may not be accurate among patients with localized   infection, recent trauma or major surgery, immunosuppressed state,   invasive fungal infection, renal dysfunction. Decisions regarding   initiation or continuation of antibiotic therapy should not be based   solely on procalcitonin levels.  (H)     Albumin  2.6(L)   2.9(L)     Alkaline Phosphatase  105   76     Allens Test   N/A       ALT  43   11     Anion Gap  17(H)   17(H)     Aniso     Slight     AST  75(H)   17     Aortic Valve Stenosis MODERATE(A)         Baso #  0.01   0.02     Basophil%  0.1   0.1     Total Bilirubin  0.8  Comment:  For infants and newborns, interpretation of results should be based  on gestational age, weight and in agreement with clinical  observations.  Premature Infant recommended reference ranges:  Up to 24 hours.............<8.0 mg/dL  Up to 48  hours............<12.0 mg/dL  3-5 days..................<15.0 mg/dL  6-29 days.................<15.0 mg/dL     0.6  Comment:  For infants and newborns, interpretation of results should be based  on gestational age, weight and in agreement with clinical  observations.  Premature Infant recommended reference ranges:  Up to 24 hours.............<8.0 mg/dL  Up to 48 hours............<12.0 mg/dL  3-5 days..................<15.0 mg/dL  6-29 days.................<15.0 mg/dL       BNP     >4,900  Comment:  Values of less than 100 pg/ml are consistent with non-CHF populations.(H)     Site   RR       BUN, Bld  24(H)   20     Calcium  10.5   9.8     Chloride  97   95     CO2  25   25     Creatinine  3.8(H)   3.6(H)     DelSys   Adult Vent       Differential Method  Automated   Automated     EF 60         eGFR if   13(A)   14(A)     eGFR if non   12  Comment:  Calculation used to obtain the estimated glomerular filtration  rate (eGFR) is the CKD-EPI equation.   (A)   12  Comment:  Calculation used to obtain the estimated glomerular filtration  rate (eGFR) is the CKD-EPI equation.   (A)     Eos #  0.0   0.0     Eosinophil%  0.0   0.1     FiO2   100       Large/Giant Platelets     Present     Glucose  99   76     Gran # (ANC)  11.1(H)   13.4(H)     Gran%  94.3(H)   88.9(H)     Hematocrit  37.0   40.6     Hemoglobin  10.9(L)   12.0     Hypo     Moderate     Lactate, Yasir  3.3  Comment:  Falsely low lactic acid results can be found in samples   containing >=13.0 mg/dL total bilirubin and/or >=3.5 mg/dL   direct bilirubin.  (H)   2.2  Comment:  Falsely low lactic acid results can be found in samples   containing >=13.0 mg/dL total bilirubin and/or >=3.5 mg/dL   direct bilirubin.       Lymph #  0.4(L)   0.8(L)     Lymph%  3.7(L)   5.6(L)     MCH  24.2(L)   24.3(L)     MCHC  29.5(L)   29.6(L)     MCV  82   82     Mode   AC/PRVC       Mono #  0.2(L)   0.8     Mono%  1.9(L)   5.6     MPV  9.3   SEE  COMMENT  Comment:  Result not available.     Mitral Valve Mobility MILDLY RESTRICTED         Ovalocytes     Occasional     Est. PA Systolic Pressure 59.09(A)         PEEP   5       Platelet Estimate     Appears normal     Platelets  165   165     POC BE   7       POC Glucose    97      POC HCO3   31.7(H)       POC PCO2   49.3(H)       POC PH   7.416       POC PO2   138(H)       POC SATURATED O2   99       POCT Glucose          Poik     Slight     Poly     Occasional     Potassium  4.2   5.4(H)     Total Protein  6.7   8.1     Rate   22       RBC  4.51   4.93     RDW  19.2(H)   19.7(H)     Sample   ARTERIAL       Schistocytes     Present     Sodium  139   137     Spherocytes     Occasional     Stomatocytes     Present     Target Cells     Occasional     Tear Drop Cells     Occasional     Troponin I     0.039  Comment:  The reference interval for Troponin I represents the 99th percentile   cutoff   for our facility and is consistent with 3rd generation assay   performance.  (H)     TSH     1.353     Tricuspid Valve Regurgitation MILD TO MODERATE         Vt   300       WBC  11.76   15.10(H)                 07/11/18  1239      Procalcitonin      Albumin      Alkaline Phosphatase      Allens Test      ALT      Anion Gap      Aniso      AST      Aortic Valve Stenosis      Baso #      Basophil%      Total Bilirubin      BNP      Site      BUN, Bld      Calcium      Chloride      CO2      Creatinine      DelSys      Differential Method      EF      eGFR if       eGFR if non       Eos #      Eosinophil%      FiO2      Large/Giant Platelets      Glucose      Gran # (ANC)      Gran%      Hematocrit      Hemoglobin      Hypo      Lactate, Yasir      Lymph #      Lymph%      MCH      MCHC      MCV      Mode      Mono #      Mono%      MPV      Mitral Valve Mobility      Ovalocytes      Est. PA Systolic Pressure      PEEP      Platelet Estimate      Platelets      POC BE      POC Glucose      POC  HCO3      POC PCO2      POC PH      POC PO2      POC SATURATED O2      POCT Glucose 97     Poik      Poly      Potassium      Total Protein      Rate      RBC      RDW      Sample      Schistocytes      Sodium      Spherocytes      Stomatocytes      Target Cells      Tear Drop Cells      Troponin I      TSH      Tricuspid Valve Regurgitation      Vt      WBC            Significant Imaging: Echocardiogram:   2D echo with color flow doppler:   Results for orders placed or performed during the hospital encounter of 07/11/18   2D echo with color flow doppler   Result Value Ref Range    EF 60 55 - 65    Aortic Valve Stenosis MODERATE (A)     Est. PA Systolic Pressure 59.09 (A)     Mitral Valve Mobility MILDLY RESTRICTED     Tricuspid Valve Regurgitation MILD TO MODERATE     and X-Ray: CXR: X-Ray Chest 1 View (CXR): No results found for this visit on 07/11/18.

## 2018-07-11 NOTE — ASSESSMENT & PLAN NOTE
-dialysis patient with multilobar pneumonia  - continue with broad spectrum antibotics   -follow up on cultures   - continue with ventilator support

## 2018-07-11 NOTE — HPI
Talia Mcfarland is a 68 y.o. female patient with  DM, DVT, ESRD, gastroparesis, and HTN who was sent to the Emergency Department from the dialysis clinic for AMS which onset gradually today after the pt completed dialysis. Per EMS, pt called from the clinic stating that she was not feeling well. Pt c/o malaise and a painful wound to the L foot. Information obtained from chart. Pt became apneic/agonal breathing and had lost pulse and required CPR for PEA cardiac arrest and ultimately achieved ROSC after epi/bicarb given. Pt had 2D ECHO with normal LV function but dilated RV and dec function, will be going to CT to r/o PE. Pt intubated/sedated, family at bedside discussed case.

## 2018-07-11 NOTE — ASSESSMENT & PLAN NOTE
In setting of PEA cardiac arrest  Trend enzymes  Will discuss ishcemic workup once stable  2D ECHO without WMA

## 2018-07-11 NOTE — ASSESSMENT & PLAN NOTE
-initial rhythm as per ed notes PEA  - two rounds of cpr with rosc   - echo with right heart strain and elevated PA and increase central vein pressure pe was rule out cta negative   - respiratory arrest in setting of pneumonia

## 2018-07-11 NOTE — ASSESSMENT & PLAN NOTE
1. ESRD on HD ( MWF , [facility] [place] [facility] ) - had short treatment of hemodialysis today, no acute indication for the same at this time, will continue to monitor    2.  Respiratory failure - following cardiac arrest requiring brief CPR, significant pneumonia noted on the CT scan of the chest, continue broad-spectrum antibiotics, continue ventilator support,    3. S/p cardiac arrest - cardiology note reviewed, patient being evaluated for acute coronary syndrome.    4. HTN - BP controlled    5. Anemia of CKD - stable Hb     6. Edema - due to nonadherence with dialysis treatments as outpatient, likely nonadherence with salt and fluid restriction,    7.  Wound on her left foot - continue antibiotics and wound care.

## 2018-07-11 NOTE — CONSULTS
[facility] -   Nephrology  Consult Note      Patient Name: Talia Mcfarland  MRN: [id number]  Admission Date: 2018  Hospital Length of Stay: 0 days  Attending Provider: Ricardo Blake DO   Primary Care Physician: Tank Castro MD  Principal Problem:HAP (hospital-acquired pneumonia)    Consults  Subjective:     HPI: Talia Mcfarland is a 78 y old AA woman with h/o HTN, PAD , ESRD on HD ( MWF , [facility] ) , DM-2 , highly noncompliant with outpatient dialysis treatments, patient misses at least 1 treatment every week for outpatient dialysis team, patient went to hemodialysis treatment today, she had some fever and nausea at the dialysis unit, and a set of blood cultures were drawn and patient received a g of vancomycin and was sent to the emergency room, while she was in the ER here patient developed cardiac arrest requiring CPR and was intubated.  Patient started on broad-spectrum antibiotics.  She also has a wound on her left ft, significant peripheral edema noted likely due to nonadherence with dialysis.  CT chest done on admission showed significant left-sided pneumonia.    Past Medical History:   Diagnosis Date    Anemia     CHF (congestive heart failure)     Diabetes mellitus     DVT (deep venous thrombosis)     Left leg    ESRD on hemodialysis 2 years     Gastroparesis     Hx of diabetic gastroparesis 3/18/2015    Hypertension     Type II or unspecified type diabetes mellitus with renal manifestations, not stated as uncontrolled(250.40) 30 years     Urinary tract infection due to extended-spectrum beta lactamase (ESBL)-producing Klebsiella        Past Surgical History:   Procedure Laterality Date     SECTION      CHOLECYSTECTOMY      KUSHAL Bilateral     LBP severe with multiple KUSHAL     ROTATOR CUFF REPAIR      UMBILICAL HERNIA REPAIR      VEIN BYPASS SURGERY Left        Review of patient's allergies indicates:   Allergen Reactions    Tylenol [acetaminophen] Other  (See Comments)     Headache     Current Facility-Administered Medications   Medication Frequency    denosumab (PROLIA) injection 60 mg Q6 Months    dextrose 5 % and 0.9 % NaCl infusion Continuous    dextrose 50% injection 12.5 g PRN    famotidine IVPB 20 mg Daily    glucagon (human recombinant) injection 1 mg PRN    heparin (porcine) injection 5,000 Units Q8H    insulin aspart U-100 pen 0-5 Units Q6H PRN    [START ON 7/12/2018] piperacillin-tazobactam 4.5 g in dextrose 5 % 100 mL IVPB (ready to mix system) Q12H    propofol (DIPRIVAN) 10 mg/mL infusion Continuous    vancomycin 1 g in dextrose 5 % 250 mL IVPB (ready to mix system) Once     Current Outpatient Prescriptions   Medication    amLODIPine (NORVASC) 5 MG tablet    aspirin (ECOTRIN) 81 MG EC tablet    carvedilol (COREG) 25 MG tablet    hydrocodone-acetaminophen 5-325mg (NORCO) 5-325 mg per tablet    levothyroxine (SYNTHROID) 25 MCG tablet    lidocaine-prilocaine (EMLA) cream    pantoprazole (PROTONIX) 20 MG tablet    promethazine (PHENERGAN) 25 MG tablet    ropinirole (REQUIP) 0.5 MG tablet     Family History     None        Social History Main Topics    Smoking status: Never Smoker    Smokeless tobacco: Never Used    Alcohol use No    Drug use: No    Sexual activity: Not on file     Review of Systems   Unable to perform ROS: Intubated     Objective:     Vital Signs (Most Recent):  Temp: (!) 100.8 °F (38.2 °C) (07/11/18 1738)  Pulse: 84 (07/11/18 1812)  Resp: (!) 23 (07/11/18 1812)  BP: 120/61 (07/11/18 1812)  SpO2: 99 % (07/11/18 1812)  O2 Device (Oxygen Therapy): ventilator (07/11/18 1551) Vital Signs (24h Range):  Temp:  [99.8 °F (37.7 °C)-100.8 °F (38.2 °C)] 100.8 °F (38.2 °C)  Pulse:  [] 84  Resp:  [18-26] 23  SpO2:  [94 %-100 %] 99 %  BP: (115-144)/(57-80) 120/61     Weight: 55.1 kg (121 lb 6.4 oz) (07/11/18 1155)  Body mass index is 24.52 kg/m².  Body surface area is 1.51 meters squared.    No intake/output data  recorded.    Physical Exam   Constitutional: She appears well-developed. She appears distressed.   HENT:   Head: Normocephalic and atraumatic.   Mouth/Throat: Oropharynx is clear and moist. No oropharyngeal exudate.   ETT in place   Eyes: Conjunctivae are normal.   Neck: No JVD present. Carotid bruit is not present. No tracheal deviation present. No thyroid mass and no thyromegaly present.   Cardiovascular: Normal rate, regular rhythm and intact distal pulses.  Exam reveals no gallop and no friction rub.    Murmur heard.  Pulmonary/Chest: No respiratory distress. She has wheezes. She has rales. She exhibits no tenderness.   Abdominal: Soft. Bowel sounds are normal. She exhibits no distension, no abdominal bruit, no ascites and no mass. There is no hepatosplenomegaly. There is no tenderness. There is no rebound, no guarding and no CVA tenderness.   Musculoskeletal: She exhibits edema. She exhibits no tenderness.   Lymphadenopathy:     She has no cervical adenopathy.   Neurological: No cranial nerve deficit. She exhibits normal muscle tone. Coordination normal.   Skin: Skin is warm and intact. No rash noted. No erythema. No pallor.       Significant Labs:    CBC:   Recent Labs  Lab 07/11/18  1536   WBC 11.76   RBC 4.51   HGB 10.9*   HCT 37.0      MCV 82   MCH 24.2*   MCHC 29.5*     CMP:   Recent Labs  Lab 07/11/18  1536   GLU 99   CALCIUM 10.5   ALBUMIN 2.6*   PROT 6.7      K 4.2   CO2 25   CL 97   BUN 24*   CREATININE 3.8*   ALKPHOS 105   ALT 43   AST 75*   BILITOT 0.8     Coagulation: No results for input(s): PT, INR, APTT in the last 168 hours.  LFTs:   Recent Labs  Lab 07/11/18  1536   ALT 43   AST 75*   ALKPHOS 105   BILITOT 0.8   PROT 6.7   ALBUMIN 2.6*     All labs within the past 24 hours have been reviewed.    Lab Results   Component Value Date    CREATININE 3.8 (H) 07/11/2018    BUN 24 (H) 07/11/2018     07/11/2018    K 4.2 07/11/2018    CL 97 07/11/2018    CO2 25 07/11/2018       Lab  Results   Component Value Date    CALCIUM 10.5 07/11/2018    PHOS 4.2 12/23/2016       Lab Results   Component Value Date    ALBUMIN 2.6 (L) 07/11/2018         Significant Imaging: reviewed     Assessment/Plan:     ESRD on hemodialysis    1. ESRD on HD ( MWF , [facility] ) - had short treatment of hemodialysis today, no acute indication for the same at this time, will continue to monitor    2.  Respiratory failure - following cardiac arrest requiring brief CPR, significant pneumonia noted on the CT scan of the chest, continue broad-spectrum antibiotics, continue ventilator support,    3. S/p cardiac arrest - cardiology note reviewed, patient being evaluated for acute coronary syndrome.    4. HTN - BP controlled    5. Anemia of CKD - stable Hb     6. Edema - due to nonadherence with dialysis treatments as outpatient, likely nonadherence with salt and fluid restriction,    7.  Wound on her left foot - continue antibiotics and wound care.            Thank you for your consult. I will follow-up with patient. Please contact us if you have any additional questions.     Total critical care time spent 70 minutes including time needed to review the records,  patient  evaluation, documentation, face-to-face discussion with the ER and primary teams,   more than 50% of the time was spent on coordination of care and counseling.       Riky Bautista MD   Nephrology  [facility] [place]

## 2018-07-11 NOTE — HPI
Talia Mcfarland is a 78 y old AA woman with h/o HTN, PAD , ESRD on HD ( [place] , [facility] ) , DM-2 , highly noncompliant with outpatient dialysis treatments, patient misses at least 1 treatment every week for outpatient dialysis team, patient went to hemodialysis treatment today, she had some fever and nausea at the dialysis unit, and a set of blood cultures were drawn and patient received a g of vancomycin and was sent to the emergency room, while she was in the ER here patient developed cardiac arrest requiring CPR and was intubated.  Patient started on broad-spectrum antibiotics.  She also has a wound on her left ft, significant peripheral edema noted likely due to nonadherence with dialysis.  CT chest done on admission showed significant left-sided pneumonia.

## 2018-07-11 NOTE — ED NOTES
Pt lying in bed in NAD, VSS, RR equal and unlabored. Bed is low, locked, and call light in reach. Side rails up x 2.  Pt  at bs, updated on POC. Pt responsive to verbal and tactile stimulation.

## 2018-07-11 NOTE — ASSESSMENT & PLAN NOTE
-acute hyper capneic and hypoxic respiratory failure   - in setting of multilobar pneumonia   - continue with broad spectrum antibotics and follow up on cultures   - pe rule out with cta

## 2018-07-11 NOTE — SUBJECTIVE & OBJECTIVE
Past Medical History:   Diagnosis Date    Anemia     CHF (congestive heart failure)     Diabetes mellitus     DVT (deep venous thrombosis)     Left leg    ESRD on hemodialysis 2 years     Gastroparesis     Hx of diabetic gastroparesis 3/18/2015    Hypertension     Type II or unspecified type diabetes mellitus with renal manifestations, not stated as uncontrolled(250.40) 30 years     Urinary tract infection due to extended-spectrum beta lactamase (ESBL)-producing Klebsiella        Past Surgical History:   Procedure Laterality Date     SECTION      CHOLECYSTECTOMY      KUSHAL Bilateral     LBP severe with multiple KUSHAL     ROTATOR CUFF REPAIR      UMBILICAL HERNIA REPAIR      VEIN BYPASS SURGERY Left        Review of patient's allergies indicates:   Allergen Reactions    Tylenol [acetaminophen] Other (See Comments)     Headache       Current Facility-Administered Medications on File Prior to Encounter   Medication    denosumab (PROLIA) injection 60 mg     Current Outpatient Prescriptions on File Prior to Encounter   Medication Sig    amLODIPine (NORVASC) 5 MG tablet TAKE 1 TABLET BY MOUTH DAILY    aspirin (ECOTRIN) 81 MG EC tablet Take 81 mg by mouth once daily.    carvedilol (COREG) 25 MG tablet Take 25 mg by mouth 2 (two) times daily with meals.    hydrocodone-acetaminophen 5-325mg (NORCO) 5-325 mg per tablet Take 1 tablet by mouth 2 (two) times daily as needed for Pain.    levothyroxine (SYNTHROID) 25 MCG tablet Take 25 mcg by mouth once daily.    lidocaine-prilocaine (EMLA) cream Apply topically as needed. APPLY TO TREATMENT AREA 1 HOUR PRIOR TO TREATMENT UTD    pantoprazole (PROTONIX) 20 MG tablet Take 1 tablet (20 mg total) by mouth once daily.    promethazine (PHENERGAN) 25 MG tablet Take 1 tablet (25 mg total) by mouth every 6 (six) hours as needed for Nausea.    ropinirole (REQUIP) 0.5 MG tablet Take 1 tablet (0.5 mg total) by mouth 3 (three) times daily.     Family History      None        Social History Main Topics    Smoking status: Never Smoker    Smokeless tobacco: Never Used    Alcohol use No    Drug use: No    Sexual activity: Not on file     Review of Systems   Unable to perform ROS: Intubated     Objective:     Vital Signs (Most Recent):  Temp: (!) 100.8 °F (38.2 °C) (07/11/18 1738)  Pulse: 87 (07/11/18 1731)  Resp: (!) 22 (07/11/18 1731)  BP: (!) 144/66 (07/11/18 1731)  SpO2: 100 % (07/11/18 1731) Vital Signs (24h Range):  Temp:  [99.8 °F (37.7 °C)-100.8 °F (38.2 °C)] 100.8 °F (38.2 °C)  Pulse:  [] 87  Resp:  [18-26] 22  SpO2:  [94 %-100 %] 100 %  BP: (115-144)/(57-80) 144/66     Weight: 55.1 kg (121 lb 6.4 oz)  Body mass index is 24.52 kg/m².    Physical Exam   Constitutional: She appears well-developed and well-nourished. She is sedated and intubated.   HENT:   Head: Normocephalic and atraumatic.   Eyes: Right eye exhibits no discharge. No scleral icterus.   Neck: No JVD present.   Cardiovascular: Normal rate and regular rhythm.    Murmur heard.  Pulmonary/Chest: Effort normal. She is intubated. She has rales.   Abdominal: Soft. Bowel sounds are normal.   Musculoskeletal: Normal range of motion.   Lymphadenopathy:     She has no cervical adenopathy.   Neurological:   Intubated and sedated    Skin: Skin is warm and dry.   Psychiatric: She has a normal mood and affect.   Nursing note and vitals reviewed.          Significant Labs: All pertinent labs within the past 24 hours have been reviewed.    Significant Imaging: I have reviewed all pertinent imaging results/findings within the past 24 hours.

## 2018-07-11 NOTE — ED NOTES
Pt family contact numbers    Donnell Mcfarland -  - [phone]    Keegan Maloney - daughter - [phone], [phone]    Steve Melendez - Son - [phone]    Jasmyn Melendez - Son - [phone]

## 2018-07-11 NOTE — HPI
Talia Mcfarland is a 66 y.o. female patient with PMHx of ESRD on HD, DM2, HTN, PVD, who presented to the ER from dialysis center with alert mental status . At dialysis center patient was receiving  First dose of vancomycin  For possible infection . She was confused so was transferred to ed . History obtained from electronic records no family bedside . 13 30 pm patient was seen by ed physician with confusion but awake . Later around 14 25 she was found not responsive bradycardiac and show breathing weak pulse was moved to ed room 4 from room 14 . Was intubated and after that was found to have no pulse(pea) cpr was started with two rounds , two epi,one atropine / calcium and bicarb  . After ROSc ekg done was with no acute changes .  . Not following commands but randomly moving extremities and opening eyes . Stat echo was done which showed right heart strain with possibility of PE . So cta was ordered . Also started on broad spectrum antibotic culture drawn

## 2018-07-11 NOTE — ED NOTES
Pt lying in bed not responding to verbal or tactile stimulation, pt breathing is shallow and pulse is weak and thready, Dr. Blake call to bs. Dr. Blake verbalized to emergently intubate pt at this time. Pt being moved to code room for intubation.

## 2018-07-11 NOTE — ED NOTES
OG tube placed.  Air bolus auscultated over epigastric area, pH on aspirated contents found to be 3.

## 2018-07-12 PROBLEM — S90.425A: Status: ACTIVE | Noted: 2018-07-12

## 2018-07-12 PROBLEM — T14.8XXA DEEP TISSUE INJURY: Status: ACTIVE | Noted: 2018-07-12

## 2018-07-12 LAB
ALBUMIN SERPL BCP-MCNC: 2.4 G/DL
ALLENS TEST: ABNORMAL
ALP SERPL-CCNC: 82 U/L
ALT SERPL W/O P-5'-P-CCNC: 32 U/L
ANION GAP SERPL CALC-SCNC: 21 MMOL/L
ANISOCYTOSIS BLD QL SMEAR: SLIGHT
AST SERPL-CCNC: 47 U/L
BASOPHILS # BLD AUTO: 0.01 K/UL
BASOPHILS NFR BLD: 0.1 %
BILIRUB SERPL-MCNC: 0.9 MG/DL
BUN SERPL-MCNC: 29 MG/DL
BURR CELLS BLD QL SMEAR: ABNORMAL
CALCIUM SERPL-MCNC: 9.5 MG/DL
CHLORIDE SERPL-SCNC: 97 MMOL/L
CO2 SERPL-SCNC: 20 MMOL/L
CREAT SERPL-MCNC: 4.4 MG/DL
DACRYOCYTES BLD QL SMEAR: ABNORMAL
DELSYS: ABNORMAL
DIFFERENTIAL METHOD: ABNORMAL
EOSINOPHIL # BLD AUTO: 0 K/UL
EOSINOPHIL NFR BLD: 0 %
ERYTHROCYTE [DISTWIDTH] IN BLOOD BY AUTOMATED COUNT: 19.6 %
ERYTHROCYTE [SEDIMENTATION RATE] IN BLOOD BY WESTERGREN METHOD: 20 MM/H
EST. GFR  (AFRICAN AMERICAN): 11 ML/MIN/1.73 M^2
EST. GFR  (NON AFRICAN AMERICAN): 10 ML/MIN/1.73 M^2
ESTIMATED AVG GLUCOSE: 114 MG/DL
FIO2: 60
GIANT PLATELETS BLD QL SMEAR: PRESENT
GLUCOSE SERPL-MCNC: 148 MG/DL
HBA1C MFR BLD HPLC: 5.6 %
HCO3 UR-SCNC: 28.2 MMOL/L (ref 24–28)
HCT VFR BLD AUTO: 36.5 %
HGB BLD-MCNC: 10.9 G/DL
HYPOCHROMIA BLD QL SMEAR: ABNORMAL
LACTATE SERPL-SCNC: 4.9 MMOL/L
LYMPHOCYTES # BLD AUTO: 0.9 K/UL
LYMPHOCYTES NFR BLD: 12.7 %
MAGNESIUM SERPL-MCNC: 2 MG/DL
MCH RBC QN AUTO: 24.4 PG
MCHC RBC AUTO-ENTMCNC: 29.9 G/DL
MCV RBC AUTO: 82 FL
MODE: ABNORMAL
MONOCYTES # BLD AUTO: 0.3 K/UL
MONOCYTES NFR BLD: 4.6 %
NEUTROPHILS # BLD AUTO: 5.9 K/UL
NEUTROPHILS NFR BLD: 82.9 %
OVALOCYTES BLD QL SMEAR: ABNORMAL
PCO2 BLDA: 33.6 MMHG (ref 35–45)
PEEP: 5
PH SMN: 7.53 [PH] (ref 7.35–7.45)
PHOSPHATE SERPL-MCNC: 5 MG/DL
PLATELET # BLD AUTO: 149 K/UL
PLATELET BLD QL SMEAR: ABNORMAL
PMV BLD AUTO: ABNORMAL FL
PO2 BLDA: 89 MMHG (ref 80–100)
POC BE: 6 MMOL/L
POC SATURATED O2: 98 % (ref 95–100)
POCT GLUCOSE: 109 MG/DL (ref 70–110)
POCT GLUCOSE: 122 MG/DL (ref 70–110)
POCT GLUCOSE: 143 MG/DL (ref 70–110)
POCT GLUCOSE: 145 MG/DL (ref 70–110)
POCT GLUCOSE: 156 MG/DL (ref 70–110)
POIKILOCYTOSIS BLD QL SMEAR: SLIGHT
POLYCHROMASIA BLD QL SMEAR: ABNORMAL
POTASSIUM SERPL-SCNC: 4.4 MMOL/L
PROT SERPL-MCNC: 6.5 G/DL
RBC # BLD AUTO: 4.47 M/UL
SAMPLE: ABNORMAL
SITE: ABNORMAL
SODIUM SERPL-SCNC: 138 MMOL/L
SPHEROCYTES BLD QL SMEAR: ABNORMAL
TARGETS BLD QL SMEAR: ABNORMAL
VANCOMYCIN SERPL-MCNC: 24.3 UG/ML
VT: 350
WBC # BLD AUTO: 7.14 K/UL

## 2018-07-12 PROCEDURE — 83605 ASSAY OF LACTIC ACID: CPT

## 2018-07-12 PROCEDURE — 94003 VENT MGMT INPAT SUBQ DAY: CPT

## 2018-07-12 PROCEDURE — 85025 COMPLETE CBC W/AUTO DIFF WBC: CPT

## 2018-07-12 PROCEDURE — 97802 MEDICAL NUTRITION INDIV IN: CPT

## 2018-07-12 PROCEDURE — 25000003 PHARM REV CODE 250: Performed by: INTERNAL MEDICINE

## 2018-07-12 PROCEDURE — 80053 COMPREHEN METABOLIC PANEL: CPT

## 2018-07-12 PROCEDURE — 94640 AIRWAY INHALATION TREATMENT: CPT

## 2018-07-12 PROCEDURE — 80100016 HC MAINTENANCE HEMODIALYSIS

## 2018-07-12 PROCEDURE — 82803 BLOOD GASES ANY COMBINATION: CPT

## 2018-07-12 PROCEDURE — 99291 CRITICAL CARE FIRST HOUR: CPT | Mod: ,,, | Performed by: INTERNAL MEDICINE

## 2018-07-12 PROCEDURE — 36600 WITHDRAWAL OF ARTERIAL BLOOD: CPT

## 2018-07-12 PROCEDURE — 94667 MNPJ CHEST WALL 1ST: CPT

## 2018-07-12 PROCEDURE — 80202 ASSAY OF VANCOMYCIN: CPT

## 2018-07-12 PROCEDURE — 87340 HEPATITIS B SURFACE AG IA: CPT

## 2018-07-12 PROCEDURE — 99900035 HC TECH TIME PER 15 MIN (STAT)

## 2018-07-12 PROCEDURE — 63600175 PHARM REV CODE 636 W HCPCS: Mod: JG | Performed by: INTERNAL MEDICINE

## 2018-07-12 PROCEDURE — 20000000 HC ICU ROOM

## 2018-07-12 PROCEDURE — S0028 INJECTION, FAMOTIDINE, 20 MG: HCPCS | Performed by: INTERNAL MEDICINE

## 2018-07-12 PROCEDURE — 36415 COLL VENOUS BLD VENIPUNCTURE: CPT

## 2018-07-12 PROCEDURE — 84100 ASSAY OF PHOSPHORUS: CPT

## 2018-07-12 PROCEDURE — P9047 ALBUMIN (HUMAN), 25%, 50ML: HCPCS | Mod: JG | Performed by: INTERNAL MEDICINE

## 2018-07-12 PROCEDURE — 63600175 PHARM REV CODE 636 W HCPCS: Performed by: INTERNAL MEDICINE

## 2018-07-12 PROCEDURE — 25000242 PHARM REV CODE 250 ALT 637 W/ HCPCS: Performed by: NURSE PRACTITIONER

## 2018-07-12 PROCEDURE — 25000003 PHARM REV CODE 250: Performed by: NURSE PRACTITIONER

## 2018-07-12 PROCEDURE — 83735 ASSAY OF MAGNESIUM: CPT

## 2018-07-12 PROCEDURE — 86706 HEP B SURFACE ANTIBODY: CPT

## 2018-07-12 RX ORDER — IPRATROPIUM BROMIDE AND ALBUTEROL SULFATE 2.5; .5 MG/3ML; MG/3ML
3 SOLUTION RESPIRATORY (INHALATION)
Status: DISCONTINUED | OUTPATIENT
Start: 2018-07-12 | End: 2018-07-12

## 2018-07-12 RX ORDER — IPRATROPIUM BROMIDE AND ALBUTEROL SULFATE 2.5; .5 MG/3ML; MG/3ML
3 SOLUTION RESPIRATORY (INHALATION) EVERY 6 HOURS
Status: DISCONTINUED | OUTPATIENT
Start: 2018-07-12 | End: 2018-07-18

## 2018-07-12 RX ORDER — POLYETHYLENE GLYCOL 3350 17 G/17G
17 POWDER, FOR SOLUTION ORAL DAILY
Status: DISCONTINUED | OUTPATIENT
Start: 2018-07-12 | End: 2018-07-18

## 2018-07-12 RX ORDER — ACETYLCYSTEINE 100 MG/ML
4 SOLUTION ORAL; RESPIRATORY (INHALATION) 2 TIMES DAILY
Status: DISCONTINUED | OUTPATIENT
Start: 2018-07-12 | End: 2018-07-18

## 2018-07-12 RX ORDER — FENTANYL CITRAT/DEXTROSE 5%/PF 100 MCG/10
PATIENT CONTROLLED ANALGESIA SYRINGE INTRAVENOUS CONTINUOUS
Status: DISCONTINUED | OUTPATIENT
Start: 2018-07-12 | End: 2018-07-13

## 2018-07-12 RX ORDER — BISACODYL 10 MG
10 SUPPOSITORY, RECTAL RECTAL DAILY PRN
Status: DISCONTINUED | OUTPATIENT
Start: 2018-07-12 | End: 2018-07-18

## 2018-07-12 RX ORDER — ALBUMIN HUMAN 250 G/1000ML
12.5 SOLUTION INTRAVENOUS
Status: DISCONTINUED | OUTPATIENT
Start: 2018-07-12 | End: 2018-07-18

## 2018-07-12 RX ADMIN — HEPARIN SODIUM 5000 UNITS: 5000 INJECTION, SOLUTION INTRAVENOUS; SUBCUTANEOUS at 05:07

## 2018-07-12 RX ADMIN — HEPARIN SODIUM 5000 UNITS: 5000 INJECTION, SOLUTION INTRAVENOUS; SUBCUTANEOUS at 02:07

## 2018-07-12 RX ADMIN — ALBUMIN HUMAN 12.5 G: 0.25 SOLUTION INTRAVENOUS at 03:07

## 2018-07-12 RX ADMIN — ACETYLCYSTEINE 4 ML: 100 SOLUTION ORAL; RESPIRATORY (INHALATION) at 07:07

## 2018-07-12 RX ADMIN — FAMOTIDINE 20 MG: 10 INJECTION INTRAVENOUS at 09:07

## 2018-07-12 RX ADMIN — PIPERACILLIN AND TAZOBACTAM 4.5 G: 4; .5 INJECTION, POWDER, LYOPHILIZED, FOR SOLUTION INTRAVENOUS; PARENTERAL at 08:07

## 2018-07-12 RX ADMIN — CHLORHEXIDINE GLUCONATE 15 ML: 1.2 RINSE ORAL at 08:07

## 2018-07-12 RX ADMIN — VANCOMYCIN HYDROCHLORIDE 500 MG: 500 INJECTION, POWDER, LYOPHILIZED, FOR SOLUTION INTRAVENOUS at 06:07

## 2018-07-12 RX ADMIN — IPRATROPIUM BROMIDE AND ALBUTEROL SULFATE 3 ML: .5; 3 SOLUTION RESPIRATORY (INHALATION) at 08:07

## 2018-07-12 RX ADMIN — CHLORHEXIDINE GLUCONATE 15 ML: 1.2 RINSE ORAL at 09:07

## 2018-07-12 RX ADMIN — PIPERACILLIN AND TAZOBACTAM 4.5 G: 4; .5 INJECTION, POWDER, LYOPHILIZED, FOR SOLUTION INTRAVENOUS; PARENTERAL at 01:07

## 2018-07-12 RX ADMIN — POLYETHYLENE GLYCOL (3350) 17 G: 17 POWDER, FOR SOLUTION ORAL at 11:07

## 2018-07-12 RX ADMIN — IPRATROPIUM BROMIDE AND ALBUTEROL SULFATE 3 ML: .5; 3 SOLUTION RESPIRATORY (INHALATION) at 12:07

## 2018-07-12 NOTE — SUBJECTIVE & OBJECTIVE
Past Medical History:   Diagnosis Date    Anemia     CHF (congestive heart failure)     Diabetes mellitus     DVT (deep venous thrombosis)     Left leg    ESRD on hemodialysis 2 years     Gastroparesis     Hypertension     Type II or unspecified type diabetes mellitus with renal manifestations, not stated as uncontrolled(250.40) 30 years     Urinary tract infection due to extended-spectrum beta lactamase (ESBL)-producing Klebsiella        Past Surgical History:   Procedure Laterality Date     SECTION      CHOLECYSTECTOMY      KUSHAL Bilateral     LBP severe with multiple KUSHAL     ROTATOR CUFF REPAIR      UMBILICAL HERNIA REPAIR      VEIN BYPASS SURGERY Left        Review of patient's allergies indicates:   Allergen Reactions    Tylenol [acetaminophen] Other (See Comments)     Headache       Family History     None        Social History Main Topics    Smoking status: Never Smoker    Smokeless tobacco: Never Used    Alcohol use No    Drug use: No    Sexual activity: Not on file         Review of Systems   Unable to perform ROS: Intubated     Objective:     Vital Signs (Most Recent):  Temp: 100.3 °F (37.9 °C) (18)  Pulse: 82 (18)  Resp: 18 (18)  BP: (!) 118/56 (18)  SpO2: 99 % (18) Vital Signs (24h Range):  Temp:  [99.8 °F (37.7 °C)-100.8 °F (38.2 °C)] 100.3 °F (37.9 °C)  Pulse:  [] 82  Resp:  [16-26] 18  SpO2:  [94 %-100 %] 99 %  BP: (115-144)/(56-80) 118/56     Weight: 55.1 kg (121 lb 6.4 oz)  Body mass index is 24.52 kg/m².      Intake/Output Summary (Last 24 hours) at 18  Last data filed at 18   Gross per 24 hour   Intake               50 ml   Output                0 ml   Net               50 ml       Physical Exam   Constitutional:   Ill appearing   HENT:   Head: Atraumatic.   Eyes:   Dilated a reactive pupils left dilated more than right   Neck: Neck supple.   Cardiovascular:   Tachycardic    Pulmonary/Chest:   Tachypneic breathing over the vent.  Rhonchi noted bilaterally.   Abdominal: Soft. There is no tenderness.   Genitourinary:   Genitourinary Comments: Berry in place   Musculoskeletal: She exhibits edema.   Bilateral lower extremity edema       Vents:  Vent Mode: A/C (07/11/18 1906)  Ventilator Initiated: Yes (07/11/18 1454)  Set Rate: 22 bmp (07/11/18 1906)  Vt Set: 300 mL (07/11/18 1906)  Pressure Support: 0 cmH20 (07/11/18 1906)  PEEP/CPAP: 5 cmH20 (07/11/18 1906)  Oxygen Concentration (%): 70 (07/11/18 1906)  Peak Airway Pressure: 29 cmH2O (07/11/18 1906)  Plateau Pressure: 0 cmH20 (07/11/18 1906)  Total Ve: 7.45 mL (07/11/18 1906)  F/VT Ratio<105 (RSBI): (!) 67.31 (07/11/18 1906)    Lines/Drains/Airways     Drain                 NG/OG Tube 07/11/18 1811 orogastric 18 Fr. Left mouth less than 1 day         Urethral Catheter 07/11/18 1445 Latex 16 Fr. less than 1 day          Airway                 Airway - Non-Surgical 07/11/18 1426 Endotracheal Tube less than 1 day          Peripheral Intravenous Line                 Peripheral IV - Single Lumen 07/11/18 1240 Right Forearm less than 1 day         Peripheral IV - Single Lumen 07/11/18 1438 Right  less than 1 day                Significant Labs:    CBC/Anemia Profile:    Recent Labs  Lab 07/11/18  1240 07/11/18  1536   WBC 15.10* 11.76   HGB 12.0 10.9*   HCT 40.6 37.0    165   MCV 82 82   RDW 19.7* 19.2*     Chemistries:    Recent Labs  Lab 07/11/18  1240 07/11/18  1536    139   K 5.4* 4.2   CL 95 97   CO2 25 25   BUN 20 24*   CREATININE 3.6* 3.8*   CALCIUM 9.8 10.5   ALBUMIN 2.9* 2.6*   PROT 8.1 6.7   BILITOT 0.6 0.8   ALKPHOS 76 105   ALT 11 43   AST 17 75*   MG  --  2.0       Significant Imaging:   CT: I have reviewed all pertinent results/findings within the past 24 hours and my personal findings are:  CT chest PE protocol showed no PE.  Multilobar pneumonia her right and left lung.  CXR: I have reviewed all pertinent  results/findings within the past 24 hours and my personal findings are:  ET tube good position.  Left-sided infiltrate.

## 2018-07-12 NOTE — PROGRESS NOTES
Johan soft wrist restraints placed on pt for pt safety with pt reaching for ET tube.  Will continue to monitor frequently.

## 2018-07-12 NOTE — ASSESSMENT & PLAN NOTE
O2/mechanical ventilation/pulmonary toilet  Daily ABG and chest x-ray  After post intubation ABG, tidal volume adjust to 350, FiO2 70%, keep respiratory rate at 22, peep 5 cm water.

## 2018-07-12 NOTE — CONSULTS
[facility] -   Critical Care Medicine  Consult Note    Patient Name: Talia Mcfarland  MRN: [id number]  Admission Date: 2018  Hospital Length of Stay: 0 days  Code Status: Prior  Attending Physician: Zayda England MD   Primary Care Provider: Tank Castro MD   Principal Problem: HAP (hospital-acquired pneumonia)      Subjective:     HPI:  68-year-old female patient brought by EMS to the emergency department from dialysis after she complained of malaise and had altered mental status after dialysis today.    Hospital/ICU Course:  While in the ER the patient had pulseless electrical activity and she had CPR performed for about 6 min.  Patient was seen and examined in the emergency room.  She was intubated.  Chest x-ray post intubation showed low ET tube ET tube was pulled out.  Repeat chest x-ray showed good position of ET tube.    Past Medical History:   Diagnosis Date    Anemia     CHF (congestive heart failure)     Diabetes mellitus     DVT (deep venous thrombosis)     Left leg    ESRD on hemodialysis 2 years     Gastroparesis     Hypertension     Type II or unspecified type diabetes mellitus with renal manifestations, not stated as uncontrolled(250.40) 30 years     Urinary tract infection due to extended-spectrum beta lactamase (ESBL)-producing Klebsiella        Past Surgical History:   Procedure Laterality Date     SECTION      CHOLECYSTECTOMY      KUSHAL Bilateral     LBP severe with multiple KUSHAL     ROTATOR CUFF REPAIR      UMBILICAL HERNIA REPAIR      VEIN BYPASS SURGERY Left        Review of patient's allergies indicates:   Allergen Reactions    Tylenol [acetaminophen] Other (See Comments)     Headache       Family History     None        Social History Main Topics    Smoking status: Never Smoker    Smokeless tobacco: Never Used    Alcohol use No    Drug use: No    Sexual activity: Not on file         Review of Systems   Unable to perform ROS: Intubated      Objective:     Vital Signs (Most Recent):  Temp: 100.3 °F (37.9 °C) (07/11/18 2007)  Pulse: 82 (07/11/18 1932)  Resp: 18 (07/11/18 1932)  BP: (!) 118/56 (07/11/18 1932)  SpO2: 99 % (07/11/18 1932) Vital Signs (24h Range):  Temp:  [99.8 °F (37.7 °C)-100.8 °F (38.2 °C)] 100.3 °F (37.9 °C)  Pulse:  [] 82  Resp:  [16-26] 18  SpO2:  [94 %-100 %] 99 %  BP: (115-144)/(56-80) 118/56     Weight: 55.1 kg (121 lb 6.4 oz)  Body mass index is 24.52 kg/m².      Intake/Output Summary (Last 24 hours) at 07/11/18 2048  Last data filed at 07/11/18 1918   Gross per 24 hour   Intake               50 ml   Output                0 ml   Net               50 ml       Physical Exam   Constitutional:   Ill appearing   HENT:   Head: Atraumatic.   Eyes:   Dilated a reactive pupils left dilated more than right   Neck: Neck supple.   Cardiovascular:   Tachycardic   Pulmonary/Chest:   Tachypneic breathing over the vent.  Rhonchi noted bilaterally.   Abdominal: Soft. There is no tenderness.   Genitourinary:   Genitourinary Comments: Berry in place   Musculoskeletal: She exhibits edema.   Bilateral lower extremity edema       Vents:  Vent Mode: A/C (07/11/18 1906)  Ventilator Initiated: Yes (07/11/18 7034)  Set Rate: 22 bmp (07/11/18 1906)  Vt Set: 300 mL (07/11/18 1906)  Pressure Support: 0 cmH20 (07/11/18 1906)  PEEP/CPAP: 5 cmH20 (07/11/18 1906)  Oxygen Concentration (%): 70 (07/11/18 1906)  Peak Airway Pressure: 29 cmH2O (07/11/18 1906)  Plateau Pressure: 0 cmH20 (07/11/18 1906)  Total Ve: 7.45 mL (07/11/18 1906)  F/VT Ratio<105 (RSBI): (!) 67.31 (07/11/18 1906)    Lines/Drains/Airways     Drain                 NG/OG Tube 07/11/18 1811 orogastric 18 Fr. Left mouth less than 1 day         Urethral Catheter 07/11/18 1445 Latex 16 Fr. less than 1 day          Airway                 Airway - Non-Surgical 07/11/18 1426 Endotracheal Tube less than 1 day          Peripheral Intravenous Line                 Peripheral IV - Single Lumen  07/11/18 1240 Right Forearm less than 1 day         Peripheral IV - Single Lumen 07/11/18 1438 Right  less than 1 day                Significant Labs:    CBC/Anemia Profile:    Recent Labs  Lab 07/11/18  1240 07/11/18  1536   WBC 15.10* 11.76   HGB 12.0 10.9*   HCT 40.6 37.0    165   MCV 82 82   RDW 19.7* 19.2*     Chemistries:    Recent Labs  Lab 07/11/18  1240 07/11/18  1536    139   K 5.4* 4.2   CL 95 97   CO2 25 25   BUN 20 24*   CREATININE 3.6* 3.8*   CALCIUM 9.8 10.5   ALBUMIN 2.9* 2.6*   PROT 8.1 6.7   BILITOT 0.6 0.8   ALKPHOS 76 105   ALT 11 43   AST 17 75*   MG  --  2.0       Significant Imaging:   CT: I have reviewed all pertinent results/findings within the past 24 hours and my personal findings are:  CT chest PE protocol showed no PE.  Multilobar pneumonia her right and left lung.  CXR: I have reviewed all pertinent results/findings within the past 24 hours and my personal findings are:  ET tube good position.  Left-sided infiltrate.    Assessment/Plan:     Pulmonary   Acute respiratory failure    O2/mechanical ventilation/pulmonary toilet  Daily ABG and chest x-ray  After post intubation ABG, tidal volume adjust to 350, FiO2 70%, keep respiratory rate at 22, peep 5 cm water.        Cardiac/Vascular   Cardiac arrest    ICU monitoring  Coiling protocol not applied, patient had PA, non shockable rhythm.  Cardiology consult        Renal/   ESRD on hemodialysis    Patient had hemodialysis today.  Nephrology consult.        ID   Severe sepsis    Broad-spectrum antibiotic with IV Zosyn and vancomycin  Check blood culture, check deep tracheal aspirate culture.            Critical Care Daily Checklist:    A: Awake: RASS Goal/Actual Goal:  RASS-2  Actual:   opening eyes spontaneously, breathing over the vent   B: Spontaneous Breathing Trial Performed?     C: SAT & SBT Coordinated?  Not applicable today                      D: Delirium: CAM-ICU     E: Early Mobility Performed? Yes.  Bedrest.   F:  Feeding Goal:    Status:     Current Diet Order   Procedures    Diet NPO      AS: Analgesia/Sedation Sedate with propofol drip   T: Thromboembolic Prophylaxis Heparin 5000 units subcutaneous q.8 hours   H: HOB > 300 Yes   U: Stress Ulcer Prophylaxis (if needed) IV PPI   G: Glucose Control Fingerstick q.6 hours plus short-acting insulin   B: Bowel Function     I: Indwelling Catheter (Lines & Berry) Necessity Keep Berry   D: De-escalation of Antimicrobials/Pharmacotherapies IV Zosyn and vancomycin.  Follow up on cultures    Plan for the day/ETD Y    Code Status:  Family/Goals of Care: Prior  Y     Critical Care Time: 40 minutes  Critical secondary to Patient has a condition that poses threat to life and bodily function:  CARDIAC ARREST     Critical care was time spent personally by me on the following activities: development of treatment plan with patient or surrogate and bedside caregivers, discussions with consultants, evaluation of patient's response to treatment, examination of patient, ordering and performing treatments and interventions, ordering and review of laboratory studies, ordering and review of radiographic studies, pulse oximetry, re-evaluation of patient's condition. This critical care time did not overlap with that of any other provider or involve time for any procedures.    Thank you for your consult. I will follow-up with patient. Please contact us if you have any additional questions.     Zena Kline MD  Critical Care Medicine  [facility] -

## 2018-07-12 NOTE — ASSESSMENT & PLAN NOTE
-dialysis patient with multilobar pneumonia  - continue with broad spectrum antibotics   -follow up on cultures   - continue with ventilator support   7/12  Continue zosyn and vanco   Gram negative rods bactermia

## 2018-07-12 NOTE — PROGRESS NOTES
Pt had hemodialysis for 3 hours.  1300ml net fluid removal.  BP could not tolerated more UF even with Albumin.  Post HD BP. 122/54 hr 78

## 2018-07-12 NOTE — ASSESSMENT & PLAN NOTE
Broad-spectrum antibiotic with IV Zosyn and vancomycin  Check blood culture, check deep tracheal aspirate culture.

## 2018-07-12 NOTE — SUBJECTIVE & OBJECTIVE
Interval History:  Remains critically ill,     Review of patient's allergies indicates:   Allergen Reactions    Tylenol [acetaminophen] Other (See Comments)     Headache     Current Facility-Administered Medications   Medication Frequency    acetylcysteine 100 mg/ml (10%) solution 4 mL BID    albuterol-ipratropium 2.5 mg-0.5 mg/3 mL nebulizer solution 3 mL Q6H    bisacodyl suppository 10 mg Daily PRN    chlorhexidine 0.12 % solution 15 mL BID    dextrose 50% injection 12.5 g PRN    famotidine (PF) injection 20 mg Daily    fentaNYL 2500 mcg in D5W 250 mL infusion premix (titrating) (conc: 10 mcg/mL) Continuous    glucagon (human recombinant) injection 1 mg PRN    heparin (porcine) injection 5,000 Units Q8H    insulin aspart U-100 pen 0-5 Units Q6H PRN    piperacillin-tazobactam 4.5 g in dextrose 5 % 100 mL IVPB (ready to mix system) Q12H    polyethylene glycol packet 17 g Daily    [START ON 7/13/2018] vancomycin 750 mg in dextrose 5 % 250 mL IVPB (ready to mix system) Q48H       Objective:     Vital Signs (Most Recent):  Temp: 98.9 °F (37.2 °C) (07/12/18 1100)  Pulse: 87 (07/12/18 1208)  Resp: 20 (07/12/18 1208)  BP: (!) 109/59 (07/12/18 1130)  SpO2: 100 % (07/12/18 1208)  O2 Device (Oxygen Therapy): ventilator (07/12/18 1208) Vital Signs (24h Range):  Temp:  [98 °F (36.7 °C)-100.8 °F (38.2 °C)] 98.9 °F (37.2 °C)  Pulse:  [] 87  Resp:  [16-34] 20  SpO2:  [94 %-100 %] 100 %  BP: ()/(37-75) 109/59     Weight: 54.2 kg (119 lb 7.8 oz) (07/11/18 2045)  Body mass index is 24.13 kg/m².  Body surface area is 1.5 meters squared.    I/O last 3 completed shifts:  In: 1036.2 [I.V.:636.2; IV Piggyback:400]  Out: 212 [Urine:12; Drains:200]    Physical Exam   Constitutional: She appears well-developed.   HENT:   Head: Normocephalic and atraumatic.   Mouth/Throat: Oropharynx is clear and moist. No oropharyngeal exudate.   ETT in place   Eyes: Conjunctivae are normal.   Neck: No JVD present. Carotid bruit is  not present. No tracheal deviation present. No thyroid mass and no thyromegaly present.   Cardiovascular: Normal rate, regular rhythm and intact distal pulses.  Exam reveals no gallop and no friction rub.    Murmur heard.  Pulmonary/Chest: No respiratory distress. She has wheezes. She has rales. She exhibits no tenderness.   Abdominal: Soft. Bowel sounds are normal. She exhibits no distension, no abdominal bruit, no ascites and no mass. There is no hepatosplenomegaly. There is no tenderness. There is no rebound, no guarding and no CVA tenderness.   Musculoskeletal: She exhibits edema. She exhibits no tenderness.   Lymphadenopathy:     She has no cervical adenopathy.   Neurological: No cranial nerve deficit. She exhibits normal muscle tone. Coordination normal.   Skin: Skin is warm and intact. No rash noted. No erythema. No pallor.       Significant Labs:  CBC:   Recent Labs  Lab 07/12/18  0350   WBC 7.14   RBC 4.47   HGB 10.9*   HCT 36.5*   *   MCV 82   MCH 24.4*   MCHC 29.9*     CMP:   Recent Labs  Lab 07/12/18  0350   *   CALCIUM 9.5   ALBUMIN 2.4*   PROT 6.5      K 4.4   CO2 20*   CL 97   BUN 29*   CREATININE 4.4*   ALKPHOS 82   ALT 32   AST 47*   BILITOT 0.9     Coagulation: No results for input(s): PT, INR, APTT in the last 168 hours.  LFTs:   Recent Labs  Lab 07/12/18  0350   ALT 32   AST 47*   ALKPHOS 82   BILITOT 0.9   PROT 6.5   ALBUMIN 2.4*     All labs within the past 24 hours have been reviewed.     Lab Results   Component Value Date    CALCIUM 9.5 07/12/2018    PHOS 5.0 (H) 07/12/2018           Significant Imaging: reviewed

## 2018-07-12 NOTE — ASSESSMENT & PLAN NOTE
1. ESRD on HD ( MWF , [facility] ) - had short treatment of hemodialysis yesterday, CXR noted, pulm edema , will plan HD today and UF as tolerated ,     Assess in AM for regular HD ,     2.  Respiratory failure - following cardiac arrest requiring brief CPR, significant pneumonia noted on the CT scan of the chest, continue broad-spectrum antibiotics, continue ventilator support, UF on HD as tolerated ,     3. S/p cardiac arrest - cardiology note reviewed,     4. HTN - BP controlled, on low side,     5. Anemia of CKD - stable Hb     6. Edema - due to nonadherence with dialysis treatments as outpatient, likely nonadherence with salt and fluid restriction, UF as tolerated ,     7.  Wound on her left foot - continue antibiotics and wound care.

## 2018-07-12 NOTE — PROGRESS NOTES
[facility] -   Critical Care Medicine  Progress Note    Patient Name: Talia Mcfarland  MRN: [id number]  Admission Date: 7/11/2018  Hospital Length of Stay: 1 days  Code Status: Prior  Attending Provider: Zayda England MD  Primary Care Provider: Tank Castro MD   Principal Problem: HAP (hospital-acquired pneumonia)    Subjective:     HPI:  68-year-old female patient brought by EMS to the emergency department from dialysis after she complained of malaise and had altered mental status after dialysis today.    Hospital/ICU Course:  While in the ER the patient had pulseless electrical activity and she had CPR performed for about 6 min.  Patient was seen and examined in the emergency room.  She was intubated.  Chest x-ray post intubation showed low ET tube ET tube was pulled out.  Repeat chest x-ray showed good position of ET tube.  7/12 Seen and examined. Intubated, no sedation, no pressors. Tmax 100.8. Bld cx GNR. CXR and ABG reviewed.     Interval History:   Review of Systems   Unable to perform ROS: Intubated       Objective:     Vital Signs (Most Recent):  Temp: 98.9 °F (37.2 °C) (07/12/18 1100)  Pulse: 87 (07/12/18 1208)  Resp: 20 (07/12/18 1208)  BP: (!) 109/59 (07/12/18 1130)  SpO2: 100 % (07/12/18 1208) Vital Signs (24h Range):  Temp:  [98 °F (36.7 °C)-100.8 °F (38.2 °C)] 98.9 °F (37.2 °C)  Pulse:  [] 87  Resp:  [16-34] 20  SpO2:  [94 %-100 %] 100 %  BP: ()/(37-75) 109/59     Weight: 54.2 kg (119 lb 7.8 oz)  Body mass index is 24.13 kg/m².      Intake/Output Summary (Last 24 hours) at 07/12/18 1336  Last data filed at 07/12/18 1100   Gross per 24 hour   Intake          1262.02 ml   Output              212 ml   Net          1050.02 ml       Physical Exam   Constitutional:   Ill appearing, frail   HENT:   ETT +    Eyes:   Sluggish pupillary reaction to light   Neck: Neck supple.   Cardiovascular:   Tachy at 87    Pulmonary/Chest: Effort normal. No respiratory distress.   + dru  bilaterally    Abdominal: Soft. There is no tenderness.   Musculoskeletal: She exhibits no edema.   Neurological:   Unresponsive , not moving extremities    Skin: Skin is warm.       Vents:  Vent Mode: A/C (07/12/18 1054)  Ventilator Initiated: Yes (07/11/18 1454)  Set Rate: 20 bmp (07/12/18 1054)  Vt Set: 350 mL (07/12/18 1054)  Pressure Support: 0 cmH20 (07/12/18 1054)  PEEP/CPAP: 5 cmH20 (07/12/18 1054)  Oxygen Concentration (%): 50 (07/12/18 1208)  Peak Airway Pressure: 31 cmH2O (07/12/18 1054)  Plateau Pressure: 0 cmH20 (07/12/18 1054)  Total Ve: 6.01 mL (07/12/18 1054)  F/VT Ratio<105 (RSBI): (!) 66.67 (07/12/18 1054)    Lines/Drains/Airways     Drain                 Hemodialysis AV Fistula Left upper arm -- days         NG/OG Tube 07/11/18 1811 orogastric 18 Fr. Left mouth less than 1 day         Urethral Catheter 07/11/18 1445 Latex 16 Fr. less than 1 day          Airway                 Airway - Non-Surgical 07/11/18 1426 Endotracheal Tube less than 1 day          Pressure Ulcer                 Pressure Injury 07/12/18 Left Heel Deep tissue injury less than 1 day         Pressure Injury 07/12/18 Right Heel Deep tissue injury less than 1 day          Peripheral Intravenous Line                 Peripheral IV - Single Lumen 07/11/18 1240 Right Forearm 1 day         Peripheral IV - Single Lumen 07/11/18 1438 Right  less than 1 day                Significant Labs:    CBC/Anemia Profile:    Recent Labs  Lab 07/11/18  1240 07/11/18  1536 07/12/18  0350   WBC 15.10* 11.76 7.14   HGB 12.0 10.9* 10.9*   HCT 40.6 37.0 36.5*    165 149*   MCV 82 82 82   RDW 19.7* 19.2* 19.6*        Chemistries:    Recent Labs  Lab 07/11/18  1240 07/11/18  1536 07/12/18  0350    139 138   K 5.4* 4.2 4.4   CL 95 97 97   CO2 25 25 20*   BUN 20 24* 29*   CREATININE 3.6* 3.8* 4.4*   CALCIUM 9.8 10.5 9.5   ALBUMIN 2.9* 2.6* 2.4*   PROT 8.1 6.7 6.5   BILITOT 0.6 0.8 0.9   ALKPHOS 76 105 82   ALT 11 43 32   AST 17 75* 47*   MG  --   2.0 2.0   PHOS  --   --  5.0*     Significant Imaging:  CXR: I have reviewed all pertinent results/findings within the past 24 hours and my personal findings are:  ETT okay, stable bilateral infiltrates Left > right   Bld cx GNR     Assessment/Plan:     Pulmonary   * HAP (hospital-acquired pneumonia)    Bld cx GNR , IV Zosyn , If resp cx negative for MRSA, dc Vanco        Acute respiratory failure    O2/mechanical ventilation/pulmonary toilet  Daily ABG and chest x-ray  After post intubation ABG, tidal volume adjust to 350, FiO2 70%, keep respiratory rate at 22, peep 5 cm water.  7/12O2/mechanical ventilation/pulmonary toilet , Daily ABG and chest x-ray. Decrease RR to 16         Cardiac/Vascular   Cardiac arrest    ICU monitoring  Coiling protocol not applied, patient had PA, non shockable rhythm.  Cardiology consult  7/12 Levophed if needed keep MAP > 65 mm Hg         Renal/   ESRD on hemodialysis    Patient had hemodialysis today.  Nephrology consult.  7/12 no need for HD today        ID   Severe sepsis    Broad-spectrum antibiotic with IV Zosyn and vancomycin    7/12 GNR septicemia. IV Zosyn.           Critical Care Daily Checklist:    A: Awake: RASS Goal/Actual Goal: RASS Goal: 0-->alert and calm  Actual: [place] Agitation Sedation Scale (RASS): Deep sedation   B: Spontaneous Breathing Trial Performed?     C: SAT & SBT Coordinated?  NA                      D: Delirium: CAM-ICU Overall CAM-ICU: Positive   E: Early Mobility Performed? NA   F: Feeding Goal: Goals: Meet > 85 % EEN/EPN while admitted  Status: Nutrition Goal Status: new   Current Diet Order   No orders of the defined types were placed in this encounter.      AS: Analgesia/Sedation Fentanyl if needed for comfort    T: Thromboembolic Prophylaxis Heparin sc    h HOB > 300 Yes   U: Stress Ulcer Prophylaxis (if needed) Famotidine    G: Glucose Control FS Q 6 hrs + short acting Insulin    B: Bowel Function     I: Indwelling Catheter (Lines & Berry)  Necessity Keep martinez    D: De-escalation of Antimicrobials/Pharmacotherapies Y.Awaiting cx     Plan for the day/ETD Y    Code Status:  Family/Goals of Care: Prior  Awaiting family visit      Critical Care Time: 35 minutes  Critical secondary to Patient has a condition that poses threat to life and bodily function: cardiac arrest       Critical care was time spent personally by me on the following activities: development of treatment plan with patient or surrogate and bedside caregivers, discussions with consultants, evaluation of patient's response to treatment, examination of patient, ordering and performing treatments and interventions, ordering and review of laboratory studies, ordering and review of radiographic studies, pulse oximetry, re-evaluation of patient's condition. This critical care time did not overlap with that of any other provider or involve time for any procedures.     Zena Kline MD  Critical Care Medicine  [facility] [place]

## 2018-07-12 NOTE — EICU
Brief new admit eval:  68 yr old female post HD got AMS.  In ED PEA arrest with CPR 6 mins.  On vent.  No code cool due to non shock able rhythm.  Seen by Pulmonary.  Lactate 3.3.  On abx for PNA/UTI pending cultures.     Camera Eval:  Discussed with bed side RN.  No new issues.  Tolerating Vent 350/5/22/70%.  On sedation. VS stable.    Data:  Reviewed.  CTPA no PE    A/P:  1. PEA arrest s/p CPR.  Stable vitals now.  EF normal.  RVD and strain.  CTPA no pulmonary embolism  - consider cardiac eval in am.  2. AHRF/sepsis from multi lobar PNA and Urosepsis.    - on Vanc/zosyn .  Not on pressors.  3. ESRD/HTN/Chronic anemia/DVT hx in past.   - glucose goals < 180  4. On VTE sq heparin      SUP- pepcid daily ordered. HOB > 30 degree.

## 2018-07-12 NOTE — HOSPITAL COURSE
S/P cardiac arrest admitted with ICU with cardiac arrest ,anoxic brain injury ,  ACINETOBACTER SPECIES bacteremia and Ecoli in the urine.    Ms Mcfarland  had continued clinical  decline with poor prognosis despite aggressive treatment .   Patient was extubated today and Hospice was consulted for comfort care .   She will be discharged to inpatient hospice the [facility] today pending signed consent

## 2018-07-12 NOTE — ASSESSMENT & PLAN NOTE
ICU monitoring  Coiling protocol not applied, patient had PA, non shockable rhythm.  Cardiology consult  7/12 Levophed if needed keep MAP > 65 mm Hg

## 2018-07-12 NOTE — PROGRESS NOTES
[facility] Hospital Medicine  Progress Note    Patient Name: Talia Mcfarland  MRN: [id number]  Patient Class: IP- Inpatient   Admission Date: 7/11/2018  Length of Stay: 1 days  Attending Physician: Zayda England MD  Primary Care Provider: Tank Castro MD        Subjective:     Principal Problem:HAP (hospital-acquired pneumonia)    HPI:  Talia Mcfarland is a 66 y.o. female patient with PMHx of ESRD on HD, DM2, HTN, PVD, who presented to the ER from dialysis center with alert mental status . At dialysis center patient was receiving  First dose of vancomycin  For possible infection . She was confused so was transferred to ed . History obtained from electronic records no family bedside . 13 30 pm patient was seen by ed physician with confusion but awake . Later around 14 25 she was found not responsive bradycardiac and show breathing weak pulse was moved to ed room 4 from room 14 . Was intubated and after that was found to have no pulse(pea) cpr was started with two rounds , two epi,one atropine / calcium and bicarb  . After ROSc ekg done was with no acute changes .  . Not following commands but randomly moving extremities and opening eyes . Stat echo was done which showed right heart strain with possibility of PE . So cta was ordered . Also started on broad spectrum antibotic culture drawn     Hospital Course:  7/12  Patient seen and examined today .she is only fentanyl drip . no improvement in mental status after cardiac arrest . Gram negative bactermia . Continue to be ventilator         Review of Systems   Unable to perform ROS: Intubated     Objective:     Vital Signs (Most Recent):  Temp: 97.9 °F (36.6 °C) (07/12/18 1500)  Pulse: 74 (07/12/18 1700)  Resp: (!) 27 (07/12/18 1700)  BP: (!) 112/49 (07/12/18 1700)  SpO2: 100 % (07/12/18 1700) Vital Signs (24h Range):  Temp:  [97.9 °F (36.6 °C)-100.8 °F (38.2 °C)] 97.9 °F (36.6 °C)  Pulse:  [30-98] 74  Resp:  [16-34] 27  SpO2:  [94 %-100 %]  100 %  BP: ()/(37-75) 112/49     Weight: 54.2 kg (119 lb 7.8 oz)  Body mass index is 24.13 kg/m².    Intake/Output Summary (Last 24 hours) at 07/12/18 1724  Last data filed at 07/12/18 1500   Gross per 24 hour   Intake          1292.02 ml   Output              212 ml   Net          1080.02 ml      Physical Exam   Constitutional:   Ill appearing, frail   HENT:   ETT +    Eyes:   Sluggish pupillary reaction to light   Neck: Neck supple.   Cardiovascular:   Tachy at 87    Pulmonary/Chest: Effort normal. No respiratory distress.   + ronchi bilaterally    Abdominal: Soft. There is no tenderness.   Musculoskeletal: She exhibits no edema.   Neurological:   Unresponsive , not moving extremities    Skin: Skin is warm.       Significant Labs: All pertinent labs within the past 24 hours have been reviewed.    Significant Imaging: I have reviewed all pertinent imaging results/findings within the past 24 hours.    Assessment/Plan:      * HAP (hospital-acquired pneumonia)    -dialysis patient with multilobar pneumonia  - continue with broad spectrum antibotics   -follow up on cultures   - continue with ventilator support   7/12  Continue zosyn and vanco   Gram negative rods bactermia         Deep tissue injury              DM type 2 (diabetes mellitus, type 2)    Continue with iss   And follow hba1c           Severe sepsis    - multilobar pneumonia on broad spectrum antibotics   - also possible oste in toe will order mri once patient stable  - continue with antibotics and follow up on cultures   7/12  Continue with zosyn and vancomycin  Blood culture gram negative rods         Acute respiratory failure    -acute hyper capneic and hypoxic respiratory failure   - in setting of multilobar pneumonia   - continue with broad spectrum antibotics and follow up on cultures   - pe rule out with cta          Cardiac arrest    -initial rhythm as per ed notes PEA  - two rounds of cpr with rosc   - echo with right heart strain and  elevated PA and increase central vein pressure pe was rule out cta negative   - respiratory arrest in setting of pneumonia  7/12  No improvement in mental status   Will continue with vent support           Elevated troponin    - stress induced in setting of the sepsis           ESRD on hemodialysis    -nephrology on board   - case discussed with  nephbertin   7/12  Nephrology on board currently getting dialysis         Type II or unspecified type diabetes mellitus with renal manifestations, not stated as uncontrolled(250.40)                VTE Risk Mitigation         Ordered     heparin (porcine) injection 5,000 Units  Every 8 hours      07/11/18 1811          Critical care time spent on the evaluation and treatment of severe organ dysfunction, review of pertinent labs and imaging studies, discussions with consulting providers and discussions with patient/family: 40 minutes.    Zayda England MD  Department of Hospital Medicine   [facility]
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
## 2018-07-12 NOTE — PROGRESS NOTES
[facility] -   Cardiology  Progress Note    Patient Name: Talia Mcfarland  MRN: [id number]  Admission Date: 7/11/2018  Hospital Length of Stay: 1 days  Code Status: Prior   Attending Physician: Zayda England MD   Primary Care Physician: Tank Castro MD  Expected Discharge Date:   Principal Problem:HAP (hospital-acquired pneumonia)    Subjective:   HPI: Talia Mcfarland is a 68 y.o. female patient with  DM, DVT, ESRD, gastroparesis, and HTN who was sent to the Emergency Department from the dialysis clinic for AMS which onset gradually today after the pt completed dialysis. Per EMS, pt called from the clinic stating that she was not feeling well. Pt c/o malaise and a painful wound to the L foot. Information obtained from chart. Pt became apneic/agonal breathing and had lost pulse and required CPR for PEA cardiac arrest and ultimately achieved ROSC after epi/bicarb given. Pt had 2D ECHO with normal LV function but dilated RV and dec function, will be going to CT to r/o PE. Pt intubated/sedated, family at bedside discussed case.     Hospital Course:   7/12/18 - pt remains intubated off pressors, resumed HD; no improvement in mental status    Interval History: no acute events o/n    Review of Systems   Unable to perform ROS: intubated     Objective:     Vital Signs (Most Recent):  Temp: 98.9 °F (37.2 °C) (07/12/18 1100)  Pulse: 89 (07/12/18 1430)  Resp: (!) 22 (07/12/18 1430)  BP: (!) 138/56 (07/12/18 1400)  SpO2: 97 % (07/12/18 1430) Vital Signs (24h Range):  Temp:  [98 °F (36.7 °C)-100.8 °F (38.2 °C)] 98.9 °F (37.2 °C)  Pulse:  [] 89  Resp:  [16-34] 22  SpO2:  [94 %-100 %] 97 %  BP: ()/(37-75) 138/56     Weight: 54.2 kg (119 lb 7.8 oz)  Body mass index is 24.13 kg/m².     SpO2: 97 %  O2 Device (Oxygen Therapy): ventilator      Intake/Output Summary (Last 24 hours) at 07/12/18 1434  Last data filed at 07/12/18 1400   Gross per 24 hour   Intake          1277.02 ml   Output               212 ml   Net          1065.02 ml       Lines/Drains/Airways     Drain                 Hemodialysis AV Fistula Left upper arm -- days         NG/OG Tube 07/11/18 1811 orogastric 18 Fr. Left mouth less than 1 day         Urethral Catheter 07/11/18 1445 Latex 16 Fr. less than 1 day          Airway                 Airway - Non-Surgical 07/11/18 1426 Endotracheal Tube 1 day          Pressure Ulcer                 Pressure Injury 07/12/18 Left Heel Deep tissue injury less than 1 day         Pressure Injury 07/12/18 Right Heel Deep tissue injury less than 1 day          Peripheral Intravenous Line                 Peripheral IV - Single Lumen 07/11/18 1240 Right Forearm 1 day         Peripheral IV - Single Lumen 07/11/18 1438 Right  less than 1 day                Physical Exam   Constitutional: She is oriented to person, place, and time. She appears well-developed and well-nourished. No distress.   HENT:   Head: Normocephalic and atraumatic.   Nose: Nose normal.   Mouth/Throat: Oropharynx is clear and moist.   Eyes: Conjunctivae and EOM are normal. No scleral icterus.   Neck: Normal range of motion. Neck supple. No JVD present. No thyromegaly present.   Cardiovascular: Normal rate, regular rhythm, S1 normal and S2 normal.  Exam reveals no gallop, no S3, no S4 and no friction rub.    Murmur heard.  Pulmonary/Chest: Effort normal and breath sounds normal. No stridor. No respiratory distress. She has no wheezes. She has no rales. She exhibits no tenderness.   Abdominal: Soft. Bowel sounds are normal. She exhibits no distension and no mass. There is no tenderness. There is no rebound.   Genitourinary:   Genitourinary Comments: Deferred   Musculoskeletal: Normal range of motion. She exhibits no edema, tenderness or deformity.   Lymphadenopathy:     She has no cervical adenopathy.   Neurological: She is alert and oriented to person, place, and time. She exhibits normal muscle tone. Coordination normal.   Skin: Skin is warm and  dry. No rash noted. She is not diaphoretic. No erythema. No pallor.   Psychiatric: She has a normal mood and affect. Her behavior is normal. Judgment and thought content normal.   Nursing note and vitals reviewed.      Significant Labs:   All pertinent lab results from the last 24 hours have been reviewed. and   Recent Lab Results       07/12/18  1238 07/12/18  0602 07/12/18  0458 07/12/18  0423 07/12/18  0350      Benzodiazepines          Methadone metabolites          Phencyclidine          Albumin     2.4(L)     Alkaline Phosphatase     82     Allens Test   Pass       ALT     32     Amorphous, UA          Amphetamine Screen, Ur          Anion Gap     21(H)     Aniso     Slight     Appearance, UA          AST     47(H)     Aortic Valve Stenosis          Bacteria, UA          Barbiturate Screen, Ur          Baso #     0.01     Basophil%     0.1     Bilirubin (UA)          Total Bilirubin     0.9  Comment:  For infants and newborns, interpretation of results should be based  on gestational age, weight and in agreement with clinical  observations.  Premature Infant recommended reference ranges:  Up to 24 hours.............<8.0 mg/dL  Up to 48 hours............<12.0 mg/dL  3-5 days..................<15.0 mg/dL  6-29 days.................<15.0 mg/dL       Site   RR       BUN, Bld     29(H)     [place] Cells     Occasional     Calcium     9.5     Chloride     97     CO2     20(L)     Cocaine (Metab.)          Color, UA          Creatinine     4.4(H)     Creatinine, Random Ur          Dels   Adult Vent       Differential Method     Automated     EF          eGFR if      11(A)     eGFR if non      10  Comment:  Calculation used to obtain the estimated glomerular filtration  rate (eGFR) is the CKD-EPI equation.   (A)     Eos #     0.0     Eosinophil%     0.0     Estimated Avg Glucose          FiO2   60       Large/Giant Platelets     Present     Glucose     148(H)     Glucose, UA           Gram Stain (Respiratory)          Gran # (ANC)     5.9     Gran%     82.9(H)     Hematocrit     36.5(L)     Hemoglobin     10.9(L)     Hemoglobin A1C          Hyaline Casts, UA          Hypo     Occasional     Ketones, UA          Lactate, Yasir     4.9  Comment:  Falsely low lactic acid results can be found in samples   containing >=13.0 mg/dL total bilirubin and/or >=3.5 mg/dL   direct bilirubin.  LA  critical result(s) called and verbal readback obtained from   Tania Jones RN, 07/12/2018 05:02  (HH)     Leukocytes, UA          Lymph #     0.9(L)     Lymph%     12.7(L)     Magnesium     2.0     MCH     24.4(L)     MCHC     29.9(L)     MCV     82     Microscopic Comment          Mode   AC/PRVC       Mono #     0.3     Mono%     4.6     MPV     SEE COMMENT  Comment:  Result not available.     Mitral Valve Mobility          Nitrite, UA          Non-Squam Epith          Occult Blood UA          Opiate Scrn, Ur          Ovalocytes     Occasional     Est. PA Systolic Pressure          PEEP   5       pH, UA          Phosphorus     5.0(H)     Platelet Estimate     Appears normal     Platelets     149(L)     POC BE   6       POC HCO3   28.2(H)       POC PCO2   33.6(L)       POC PH   7.532(H)       POC PO2   89       POC SATURATED O2   98       POCT Glucose 122(H) 145(H)  156(H)      Poik     Slight     Poly     Occasional     Potassium     4.4     Total Protein     6.5     Protein, UA          Rate   20       RBC     4.47     RBC, UA          RDW     19.6(H)     Sample   ARTERIAL       Sodium     138     Specific Gravity, UA          Specimen UA          Spherocytes     Occasional     Squam Epithel, UA          Target Cells     Occasional     Tear Drop Cells     Occasional     Marijuana (THC) Metabolite          Toxicology Information          Tricuspid Valve Regurgitation          Urobilinogen, UA          Vancomycin, Random     24.3     Vt   350       WBC Clumps, UA          WBC, UA          WBC     7.14     Yeast, UA                       07/11/18  2359 07/11/18  2105 07/11/18 2029 07/11/18 2027 07/11/18  1915      Benzodiazepines   Negative       Methadone metabolites   Negative       Phencyclidine   Negative       Albumin          Alkaline Phosphatase          Allens Test          ALT          Amorphous, UA    Moderate      Amphetamine Screen, Ur   Negative       Anion Gap          Aniso          Appearance, UA    Cloudy(A)      AST          Aortic Valve Stenosis          Bacteria, UA    Moderate(A)      Barbiturate Screen, Ur   Negative       Baso #          Basophil%          Bilirubin (UA)    SEE COMMENT  Comment:  Color may interfere with results      Total Bilirubin          Site          BUN, Bld          Vera Cells          Calcium          Chloride          CO2          Cocaine (Metab.)   Negative       Color, UA    Red(A)      Creatinine          Creatinine, Random Ur   <5.0  Comment:  The random urine reference ranges provided were established   for 24 hour urine collections.  No reference ranges exist for  random urine specimens.  Correlate clinically.  (L)       DelSys          Differential Method          EF          eGFR if           eGFR if non           Eos #          Eosinophil%          Estimated Avg Glucose     114     FiO2          Large/Giant Platelets          Glucose          Glucose, UA    SEE COMMENT  Comment:  Color may interfere with results      Gram Stain (Respiratory)  <10 epithelial cells per low power field.          Rare WBC's          Rare budding yeast        Gran # (ANC)          Gran%          Hematocrit          Hemoglobin          Hemoglobin A1C     5.6  Comment:  ADA Screening Guidelines:  5.7-6.4%  Consistent with prediabetes  >or=6.5%  Consistent with diabetes  High levels of fetal hemoglobin interfere with the HbA1C  assay. Heterozygous hemoglobin variants (HbS, HgC, etc)do  not significantly interfere with this assay.   However, presence of multiple  variants may affect accuracy.       Hyaline Casts, UA    SEE COMMENT  Comment:  Present, unable to quantitate due to obscuring amounts of other   cellular elements  (A)      Hypo          Ketones, UA    SEE COMMENT  Comment:  Color may interfere with results      Lactate, Yasir          Leukocytes, UA    SEE COMMENT  Comment:  Color may interfere with results      Lymph #          Lymph%          Magnesium          MCH          MCHC          MCV          Microscopic Comment    SEE COMMENT  Comment:  Other formed elements not mentioned in the report are not   present in the microscopic examination.         Mode          Mono #          Mono%          MPV          Mitral Valve Mobility          Nitrite, UA    SEE COMMENT  Comment:  Color may interfere with results      Non-Squam Epith    SEE COMMENT  Comment:  Present, unable to quantitate due to obscuring amounts of other   cellular elements  (A)      Occult Blood UA    SEE COMMENT  Comment:  Color may interfere with results      Opiate Scrn, Ur   Negative       Ovalocytes          Est. PA Systolic Pressure          PEEP          pH, UA    SEE COMMENT  Comment:  Color may interfere with results      Phosphorus          Platelet Estimate          Platelets          POC BE          POC HCO3          POC PCO2          POC PH          POC PO2          POC SATURATED O2          POCT Glucose 143(H)         Poik          Poly          Potassium          Total Protein          Protein, UA    SEE COMMENT  Comment:  Recommend a 24 hour urine protein or a urine   protein/creatinine ratio if globulin induced proteinuria is  clinically suspected.  Color may interfere with results        Rate          RBC          RBC, UA    >100(H)      RDW          Sample          Sodium          Specific [place], UA    SEE COMMENT  Comment:  Color may interfere with results      Specimen UA    Urine, Catheterized      Spherocytes          Squam Epithel, UA    SEE COMMENT  Comment:  Present,  unable to quantitate due to obscuring amounts of other   cellular elements        Target Cells          Tear Drop Cells          Marijuana (THC) Metabolite   Negative       Toxicology Information   SEE COMMENT  Comment:  This screen includes the following classes of drugs at the   listed cut-off:  Benzodiazepines                  200 ng/ml  Methadone                        300 ng/ml  Cocaine metabolite               300 ng/ml  Opiates                          300 ng/ml  Barbiturates                     200 ng/ml  Amphetamines                    1000 ng/ml  Marijuana metabs (THC)            50 ng/ml  Phencyclidine (PCP)               25 ng/ml  High concentrations of Diphenhydramine may cross-react with  Phencyclidine PCP screening immunoassay giving a false   positive result.  High concentrations of Methylenedioxymethamphetamine (MDMA aka  Ectasy) and other structurally similar compounds may cross-   react with the Amphetamine/Methamphetamine screening   immunoassay giving a false positive result.  A metabolite of the anti-HIV drug Sustiva () may cause  false positive results in the Marijuana metabolite (THC)   screening assay.  Note: This exception list includes only more common   interferants in toxicology screen testing.  Because of many   cross-reactantspositive results on toxicology drug screens   should be confirmed whenever results do not correlate with   clinical presentation.  This report is intended for use in clinical monitoring and  management of patients. It is not intended for use in   employment related drug testing.  Because of any cross-reactants, positive results on toxicology  drug screens should be confirmed whenever results do not  correlate with clinical presentation.  Presumptive positive results are unconfirmed and may be used   only for medical purposes.         Tricuspid Valve Regurgitation          Urobilinogen, UA    SEE COMMENT  Comment:  Color may interfere with results       Vancomycin, Random          Vt          WBC Clumps, UA    Moderate(A)      WBC, UA    >100(H)      WBC          Yeast, UA    None                  07/11/18  1850 07/11/18  1640 07/11/18  1536 07/11/18  1511      Benzodiazepines         Methadone metabolites         Phencyclidine         Albumin   2.6(L)      Alkaline Phosphatase   105      Allens Test    N/A     ALT   43      Amorphous, UA         Amphetamine Screen, Ur         Anion Gap   17(H)      Aniso         Appearance, UA         AST   75(H)      Aortic Valve Stenosis  MODERATE(A)       Bacteria, UA         Barbiturate Screen, Ur         Baso #   0.01      Basophil%   0.1      Bilirubin (UA)         Total Bilirubin   0.8  Comment:  For infants and newborns, interpretation of results should be based  on gestational age, weight and in agreement with clinical  observations.  Premature Infant recommended reference ranges:  Up to 24 hours.............<8.0 mg/dL  Up to 48 hours............<12.0 mg/dL  3-5 days..................<15.0 mg/dL  6-29 days.................<15.0 mg/dL        Site    RR     BUN, Bld   24(H)      [place] Cells         Calcium   10.5      Chloride   97      CO2   25      Cocaine (Metab.)         Color, UA         Creatinine   3.8(H)      Creatinine, Random Ur         DelSys    Adult Vent     Differential Method   Automated      EF  60       eGFR if    13(A)      eGFR if non    12  Comment:  Calculation used to obtain the estimated glomerular filtration  rate (eGFR) is the CKD-EPI equation.   (A)      Eos #   0.0      Eosinophil%   0.0      Estimated Avg Glucose         FiO2    100     Large/Giant Platelets         Glucose   99      Glucose, UA         Gram Stain (Respiratory)         Gran # (ANC)   11.1(H)      Gran%   94.3(H)      Hematocrit   37.0      Hemoglobin   10.9(L)      Hemoglobin A1C         Hyaline Casts, UA         Hypo         Ketones, UA         Lactate, Yasir   3.3  Comment:  Falsely low lactic acid  results can be found in samples   containing >=13.0 mg/dL total bilirubin and/or >=3.5 mg/dL   direct bilirubin.  (H)      Leukocytes, UA         Lymph #   0.4(L)      Lymph%   3.7(L)      Magnesium   2.0      MCH   24.2(L)      MCHC   29.5(L)      MCV   82      Microscopic Comment         Mode    AC/PRVC     Mono #   0.2(L)      Mono%   1.9(L)      MPV   9.3      Mitral Valve Mobility  MILDLY RESTRICTED       Nitrite, UA         Non-Squam Epith         Occult Blood UA         Opiate Scrn, Ur         Ovalocytes         Est. PA Systolic Pressure  59.09(A)       PEEP    5     pH, UA         Phosphorus         Platelet Estimate         Platelets   165      POC BE    7     POC HCO3    31.7(H)     POC PCO2    49.3(H)     POC PH    7.416     POC PO2    138(H)     POC SATURATED O2    99     POCT Glucose 114(H)        Poik         Poly         Potassium   4.2      Total Protein   6.7      Protein, UA         Rate    22     RBC   4.51      RBC, UA         RDW   19.2(H)      Sample    ARTERIAL     Sodium   139      Specific Gravity, UA         Specimen UA         Spherocytes         Squam Epithel, UA         Target Cells         Tear Drop Cells         Marijuana (THC) Metabolite         Toxicology Information         Tricuspid Valve Regurgitation  MILD TO MODERATE       Urobilinogen, UA         Vancomycin, Random         Vt    300     WBC Clumps, UA         WBC, UA         WBC   11.76      Yeast, UA               Significant Imaging: Echocardiogram:   2D echo with color flow doppler:   Results for orders placed or performed during the hospital encounter of 07/11/18   2D echo with color flow doppler   Result Value Ref Range    EF 60 55 - 65    Aortic Valve Stenosis MODERATE (A)     Est. PA Systolic Pressure 59.09 (A)     Mitral Valve Mobility MILDLY RESTRICTED     Tricuspid Valve Regurgitation MILD TO MODERATE     and X-Ray: CXR: X-Ray Chest 1 View (CXR):   Results for orders placed or performed during the hospital encounter  of 07/11/18   X-Ray Chest 1 View    Narrative    EXAMINATION:  XR CHEST 1 VIEW    CLINICAL HISTORY:  intubation;    COMPARISON:  07/11/2018    FINDINGS:  There has been interval placement of an NG tube.  The tip is not included on the film.  An endotracheal tube remains in place.  There is mild cardiomegaly.  There is prominence of the right hilar region.  There is a moderate amount of alveolar consolidation scattered throughout the left lung.  There is no pneumothorax.  The costophrenic angles are sharp.      Impression    1. There has been interval placement of an NG tube. The tip is not included on the film.  2. There is a moderate amount of alveolar consolidation scattered throughout the left lung. There is prominence of the right hilar region.  This is characteristic of pneumonia and adenopathy.  I recommend radiographic follow-up until complete resolution of the lungs.  3. There is mild cardiomegaly.  4. An endotracheal tube remains in place.  .      Electronically signed by: Colton Gaviria MD  Date:    07/12/2018  Time:    08:20     Assessment and Plan:     Brief HPI: see above    Severe sepsis    Cont tx per ICU team        Acute respiratory failure    Cont vent per ICU, wean as tolerated         Cardiac arrest    CTA neg for PE  Pt also has PNA on CXR leading to hypoxic arrest  Cont care per ICU        Elevated troponin    In setting of PEA cardiac arrest  Trend enzymes  Will discuss ishcemic workup once stable  2D ECHO without WMA        ESRD on hemodialysis    Cont HD per nephrology             VTE Risk Mitigation         Ordered     heparin (porcine) injection 5,000 Units  Every 8 hours      07/11/18 1811      Critical Care Time:  45 minutes  Critical secondary to Patient has a condition that poses threat to life and bodily function:      cardiac arrest, PEA, sepsis    Critical care was time spent personally by me on the following activities: development of treatment plan with patient or surrogate and  bedside caregivers, discussions with consultants, evaluation of patient's response to treatment, examination of patient, ordering and performing treatments and interventions, ordering and review of laboratory studies, ordering and review of cardiac and radiographic studies, telemetry and re-evaluation of patient's condition. This critical care time did not overlap with that of any other provider or involve time for any procedures.    Manny Avina Md, MD  Cardiology  [facility] [place]

## 2018-07-12 NOTE — CONSULTS
"  [facility] -   Adult Nutrition  Consult Note    SUMMARY     Recommendations    Recommendation/Intervention: 1. Continue current TF regimen. 2. Check residuals Q4 hours. Hold if > 500 cc.3. Will continue to monitor.   Goals: Meet > 85 % EEN/EPN while admitted  Nutrition Goal Status: new  Communication of RD Recs: discussed on rounds (reviewed with NP)    Reason for Assessment    Reason for Assessment: consult  Dx:  1. Respiratory arrest    2. Altered mental status    3. Swelling    4. Osteomyelitis    5. Chest pain    6. Altered mental status, unspecified altered mental status type    7. Cardiac arrest      Past Medical History:   Diagnosis Date    Anemia     CHF (congestive heart failure)     Diabetes mellitus     DVT (deep venous thrombosis)     Left leg    ESRD on hemodialysis 2 years 2012    Gastroparesis     Hypertension     Type II or unspecified type diabetes mellitus with renal manifestations, not stated as uncontrolled(250.40) 30 years     Urinary tract infection due to extended-spectrum beta lactamase (ESBL)-producing Klebsiella        Interdisciplinary Rounds: attended  General Information Comments: Pt currently in ICU, intubated, sedated.  Per ICU staff, pt non-compliant with outpatient dialysis. Reviewed TF recs with NP this AM during rounds, ordered per RD recs.   Nutrition Discharge Planning: too soon to determine    Nutrition Risk Screen         Nutrition/Diet History    Food Preferences: TITO    Anthropometrics    Temp: 99.3 °F (37.4 °C)  Height Method: Estimated  Height: 4' 11" (149.9 cm)  Height (inches): 59 in  Weight Method: Bed Scale  Weight: 54.2 kg (119 lb 7.8 oz)  Weight (lb): 119.49 lb  Ideal Body Weight (IBW), Female: 95 lb  % Ideal Body Weight, Female (lb): 125.78 lb  BMI (Calculated): 24.2  BMI Grade: 18.5-24.9 - normal       Lab/Procedures/Meds    Pertinent Labs Reviewed: reviewed  BMP  Lab Results   Component Value Date     07/12/2018    K 4.4 07/12/2018    " CL 97 07/12/2018    CO2 20 (L) 07/12/2018    BUN 29 (H) 07/12/2018    CREATININE 4.4 (H) 07/12/2018    CALCIUM 9.5 07/12/2018    ANIONGAP 21 (H) 07/12/2018    ESTGFRAFRICA 11 (A) 07/12/2018    EGFRNONAA 10 (A) 07/12/2018     Lab Results   Component Value Date    CALCIUM 9.5 07/12/2018    PHOS 5.0 (H) 07/12/2018     Lab Results   Component Value Date    ALBUMIN 2.4 (L) 07/12/2018   ;    Recent Labs  Lab 07/12/18  0602   POCTGLUCOSE 145*     Lab Results   Component Value Date    HGBA1C 4.3 (L) 12/23/2016       Pertinent Medications Reviewed: reviewed    Physical Findings/Assessment    Overall Physical Appearance: on ventilator support  Tubes:  (OG)  Oral/Mouth Cavity: tooth/teeth missing  Skin:  (Burn)    Estimated/Assessed Needs    Weight Used For Calorie Calculations: 54.2 kg (119 lb 7.8 oz)  Energy Calorie Requirements (kcal): 1330.97  Energy Need Method: [facility]  Protein Requirements: 65- 81 g  Weight Used For Protein Calculations: 54.2 kg (119 lb 7.8 oz)     Fluid Need Method: RDA Method (or per MD)  RDA Method (mL): 1330.97  CHO Requirement: 50 % EEN      Nutrition Prescription Ordered    Current Diet Order: NPO  Current Nutrition Support Formula Ordered:  (Novasource at 25 ml/ hr + 2 pack of beneprotein BID)    Evaluation of Received Nutrient/Fluid Intake    Enteral Calories (kcal): 1300  Enteral Protein (gm): 78  % Kcal Needs: 98  % Protein Needs: 100     Intake/Output Summary (Last 24 hours) at 07/12/18 1137  Last data filed at 07/12/18 1031   Gross per 24 hour   Intake          1262.02 ml   Output              212 ml   Net          1050.02 ml       % Intake of Estimated Energy Needs: 75 - 100 %  % Meal Intake: 75 - 100 %    Nutrition Risk       2xweekly    Assessment and Plan    Nutrition Problem:  Inadequate oral intake      Related to (etiology):   Mechanical ventilation      Signs and Symptoms (as evidenced by):   NPO,on TF      Interventions/Recommendations (treatment strategy):  Please see RD recs  above.     Nutrition Diagnosis Status:   New         Monitor and Evaluation    Food and Nutrient Intake: energy intake, enteral nutrition intake  Food and Nutrient Adminstration: enteral and parenteral nutrition administration  Anthropometric Measurements: weight  Biochemical Data, Medical Tests and Procedures: electrolyte and renal panel, glucose/endocrine profile  Nutrition-Focused Physical Findings: overall appearance, skin     Nutrition Follow-Up    RD Follow-up?: Yes (2xweekly)

## 2018-07-12 NOTE — PLAN OF CARE
Problem: Patient Care Overview  Goal: Plan of Care Review  Outcome: Ongoing (interventions implemented as appropriate)  VSS throughout shift, some mild hypotension at times especially if pt is turned on left side. Wound care saw pt and put in new orders. Pt not following commands, no movement of extremities. Pt opens eyes at times and at times will withdraw to pain. Dialysis done at bedside. 1.3L removed. Pt tolerated well, received albumin during treatment. Family updated over the phone. POC reviewed.

## 2018-07-12 NOTE — HOSPITAL COURSE
7/12/18 - pt remains intubated off pressors, resumed HD; no improvement in mental status  7/13/18 - No acute events o/n, no change in mental status; neurology to evaluate, poor prognosis  7/14/18 - Family meeting today with neurology and staff discussing prognosis; no neuro recovery  7/15/18 - Pt is NOT DNR; family to meet later; pt had some eyemovement activty last night thought to be seizures given ativan; otherwise no purposeful movement; remains intubated; hemodyn stable.

## 2018-07-12 NOTE — ASSESSMENT & PLAN NOTE
-initial rhythm as per ed notes PEA  - two rounds of cpr with rosc   - echo with right heart strain and elevated PA and increase central vein pressure pe was rule out cta negative   - respiratory arrest in setting of pneumonia  7/12  No improvement in mental status   Will continue with vent support

## 2018-07-12 NOTE — PROGRESS NOTES
Vancomycin Progress Note    Indication: hospital-acquired pneumonia, sepsis  WBC = 7.14 (down from 15.10 yesterday)  Tmax = 100.8  Cultures:     Blood x 2 (7/11) -- GNR     Urine -- not yet collected     Resp (7/11) -- rare WBCs, rare budding yeast   Pt has ESRD on HD qMWF outpatient     Pt underwent dialysis at clinic yesterday then received vancomycin 1gm dose here @ 1918 last night  Random level @ 0350 this AM = 24.3 mcg/ml   Pt undergoing dialysis now & will receive a 500 mg dose post-HD per protocol for a level of 19-25 mcg/ml   Will check another random level tomorrow 7/13 with AM labs in case they continue with her usual MWF schedule     Thank you for allowing us to participate in this patient's care.   Katherine McArdle, Pharm.D. 7/12/2018 3:32 PM

## 2018-07-12 NOTE — PROGRESS NOTES
Patient arrived to room via stretcher and bag ventilation from ER accompanied by nurse. Transferred to bed.  Heart monitor in place, vital signs taken and stable see flow sheet. Pt placed on previous vent settings by Enrico RT.  Bed in lowest position and locked in place.  EICU MD made aware of pt arrival.  Pt gtt continued with prop at 15 D5NS at 50.  Per ER RN Benedict, he notified pt family of admission to ICU.  Will continue to monitor.

## 2018-07-12 NOTE — PLAN OF CARE
Problem: Patient Care Overview  Goal: Plan of Care Review  Outcome: Ongoing (interventions implemented as appropriate)  Recommendations     Recommendation/Intervention: 1. Continue current TF regimen. 2. Check residuals Q4 hours. Hold if > 500 cc.3. Will continue to monitor.   Goals: Meet > 85 % EEN/EPN while admitted  Nutrition Goal Status: new  Communication of RD Recs: discussed on rounds (reviewed with NP)

## 2018-07-12 NOTE — ED NOTES
Noticed blood tinged puss like drainage in martinez drainage tubing.  Spoke with Benedict EAGLE with [facility] medicine.  Martinez irrigated with 40 ml NS.  Specimen obtained and sent to lab.

## 2018-07-12 NOTE — HOSPITAL COURSE
While in the ER the patient had pulseless electrical activity and she had CPR performed for about 6 min.  Patient was seen and examined in the emergency room.  She was intubated.  Chest x-ray post intubation showed low ET tube ET tube was pulled out.  Repeat chest x-ray showed good position of ET tube.  7/12 Seen and examined. Intubated, no sedation, no pressors. Tmax 100.8. Bld cx GNR. CXR and ABG reviewed.   7/13 patient seen and examined, intubated, not sedated no pressors.  Chest x-ray and ABG reviewed no changes in mental status.  Breathing over the vent.  Not moving extremities unresponsive.  7/14 seen and examined, intubated, no sedation.  Chest x-ray and ABG reviewed.  No overall change in patient's status.  Had dialysis today.  7/15 - No neuro improvement still resting on full vent support with TF.  Family at bedside.   7/16 - Still no neuro change.  Resting on vent and tolerating TF.   7/17 - No neuro change.  Resting on vent and tolerating TF.  Discussed care with spouse at bedside yesterday and he states patient would not want Trach/PEG and long term support.  He plans to make final decision regarding Trach/PEG vs comfort care tomorrow  7/18 - spouse and 3 children at bedside and request comfort care with palliative vent withdrawal.  They feel she would not want long term support.  She has had no neuro improvement.  Will honor family wishes.

## 2018-07-12 NOTE — SUBJECTIVE & OBJECTIVE
Interval History: no acute events o/n    Review of Systems   Unable to perform ROS: intubated     Objective:     Vital Signs (Most Recent):  Temp: 98.9 °F (37.2 °C) (07/12/18 1100)  Pulse: 89 (07/12/18 1430)  Resp: (!) 22 (07/12/18 1430)  BP: (!) 138/56 (07/12/18 1400)  SpO2: 97 % (07/12/18 1430) Vital Signs (24h Range):  Temp:  [98 °F (36.7 °C)-100.8 °F (38.2 °C)] 98.9 °F (37.2 °C)  Pulse:  [] 89  Resp:  [16-34] 22  SpO2:  [94 %-100 %] 97 %  BP: ()/(37-75) 138/56     Weight: 54.2 kg (119 lb 7.8 oz)  Body mass index is 24.13 kg/m².     SpO2: 97 %  O2 Device (Oxygen Therapy): ventilator      Intake/Output Summary (Last 24 hours) at 07/12/18 1434  Last data filed at 07/12/18 1400   Gross per 24 hour   Intake          1277.02 ml   Output              212 ml   Net          1065.02 ml       Lines/Drains/Airways     Drain                 Hemodialysis AV Fistula Left upper arm -- days         NG/OG Tube 07/11/18 1811 orogastric 18 Fr. Left mouth less than 1 day         Urethral Catheter 07/11/18 1445 Latex 16 Fr. less than 1 day          Airway                 Airway - Non-Surgical 07/11/18 1426 Endotracheal Tube 1 day          Pressure Ulcer                 Pressure Injury 07/12/18 Left Heel Deep tissue injury less than 1 day         Pressure Injury 07/12/18 Right Heel Deep tissue injury less than 1 day          Peripheral Intravenous Line                 Peripheral IV - Single Lumen 07/11/18 1240 Right Forearm 1 day         Peripheral IV - Single Lumen 07/11/18 1438 Right  less than 1 day                Physical Exam   Constitutional: She is oriented to person, place, and time. She appears well-developed and well-nourished. No distress.   HENT:   Head: Normocephalic and atraumatic.   Nose: Nose normal.   Mouth/Throat: Oropharynx is clear and moist.   Eyes: Conjunctivae and EOM are normal. No scleral icterus.   Neck: Normal range of motion. Neck supple. No JVD present. No thyromegaly present.   Cardiovascular:  Normal rate, regular rhythm, S1 normal and S2 normal.  Exam reveals no gallop, no S3, no S4 and no friction rub.    Murmur heard.  Pulmonary/Chest: Effort normal and breath sounds normal. No stridor. No respiratory distress. She has no wheezes. She has no rales. She exhibits no tenderness.   Abdominal: Soft. Bowel sounds are normal. She exhibits no distension and no mass. There is no tenderness. There is no rebound.   Genitourinary:   Genitourinary Comments: Deferred   Musculoskeletal: Normal range of motion. She exhibits no edema, tenderness or deformity.   Lymphadenopathy:     She has no cervical adenopathy.   Neurological: She is alert and oriented to person, place, and time. She exhibits normal muscle tone. Coordination normal.   Skin: Skin is warm and dry. No rash noted. She is not diaphoretic. No erythema. No pallor.   Psychiatric: She has a normal mood and affect. Her behavior is normal. Judgment and thought content normal.   Nursing note and vitals reviewed.      Significant Labs:   All pertinent lab results from the last 24 hours have been reviewed. and   Recent Lab Results       07/12/18  1238 07/12/18  0602 07/12/18  0458 07/12/18  0423 07/12/18  0350      Benzodiazepines          Methadone metabolites          Phencyclidine          Albumin     2.4(L)     Alkaline Phosphatase     82     Allens Test   Pass       ALT     32     Amorphous, UA          Amphetamine Screen, Ur          Anion Gap     21(H)     Aniso     Slight     Appearance, UA          AST     47(H)     Aortic Valve Stenosis          Bacteria, UA          Barbiturate Screen, Ur          Baso #     0.01     Basophil%     0.1     Bilirubin (UA)          Total Bilirubin     0.9  Comment:  For infants and newborns, interpretation of results should be based  on gestational age, weight and in agreement with clinical  observations.  Premature Infant recommended reference ranges:  Up to 24 hours.............<8.0 mg/dL  Up to 48 hours............<12.0  mg/dL  3-5 days..................<15.0 mg/dL  6-29 days.................<15.0 mg/dL       Site   RR       BUN, Bld     29(H)     [place]askins Cells     Occasional     Calcium     9.5     Chloride     97     CO2     20(L)     Cocaine (Metab.)          Color, UA          Creatinine     4.4(H)     Creatinine, Random Ur          DelSys   Adult Vent       Differential Method     Automated     EF          eGFR if      11(A)     eGFR if non      10  Comment:  Calculation used to obtain the estimated glomerular filtration  rate (eGFR) is the CKD-EPI equation.   (A)     Eos #     0.0     Eosinophil%     0.0     Estimated Avg Glucose          FiO2   60       Large/Giant Platelets     Present     Glucose     148(H)     Glucose, UA          Gram Stain (Respiratory)          Gran # (ANC)     5.9     Gran%     82.9(H)     Hematocrit     36.5(L)     Hemoglobin     10.9(L)     Hemoglobin A1C          Hyaline Casts, UA          Hypo     Occasional     Ketones, UA          Lactate, Yasir     4.9  Comment:  Falsely low lactic acid results can be found in samples   containing >=13.0 mg/dL total bilirubin and/or >=3.5 mg/dL   direct bilirubin.  LA  critical result(s) called and verbal readback obtained from   Tania Jones RN, 07/12/2018 05:02  (HH)     Leukocytes, UA          Lymph #     0.9(L)     Lymph%     12.7(L)     Magnesium     2.0     MCH     24.4(L)     MCHC     29.9(L)     MCV     82     Microscopic Comment          Mode   AC/PRVC       Mono #     0.3     Mono%     4.6     MPV     SEE COMMENT  Comment:  Result not available.     Mitral Valve Mobility          Nitrite, UA          Non-Squam Epith          Occult Blood UA          Opiate Scrn, Ur          Ovalocytes     Occasional     Est. PA Systolic Pressure          PEEP   5       pH, UA          Phosphorus     5.0(H)     Platelet Estimate     Appears normal     Platelets     149(L)     POC BE   6       POC HCO3   28.2(H)       POC PCO2   33.6(L)        POC PH   7.532(H)       POC PO2   89       POC SATURATED O2   98       POCT Glucose 122(H) 145(H)  156(H)      Poik     Slight     Poly     Occasional     Potassium     4.4     Total Protein     6.5     Protein, UA          Rate   20       RBC     4.47     RBC, UA          RDW     19.6(H)     Sample   ARTERIAL       Sodium     138     Specific Gravity, UA          Specimen UA          Spherocytes     Occasional     Squam Epithel, UA          Target Cells     Occasional     Tear Drop Cells     Occasional     Marijuana (THC) Metabolite          Toxicology Information          Tricuspid Valve Regurgitation          Urobilinogen, UA          Vancomycin, Random     24.3     Vt   350       WBC Clumps, UA          WBC, UA          WBC     7.14     Yeast, UA                      07/11/18  2359 07/11/18  2105 07/11/18  2029 07/11/18  2027 07/11/18  1915      Benzodiazepines   Negative       Methadone metabolites   Negative       Phencyclidine   Negative       Albumin          Alkaline Phosphatase          Allens Test          ALT          Amorphous, UA    Moderate      Amphetamine Screen, Ur   Negative       Anion Gap          Aniso          Appearance, UA    Cloudy(A)      AST          Aortic Valve Stenosis          Bacteria, UA    Moderate(A)      Barbiturate Screen, Ur   Negative       Baso #          Basophil%          Bilirubin (UA)    SEE COMMENT  Comment:  Color may interfere with results      Total Bilirubin          Site          BUN, Bld          [place] Cells          Calcium          Chloride          CO2          Cocaine (Metab.)   Negative       Color, UA    Red(A)      Creatinine          Creatinine, Random Ur   <5.0  Comment:  The random urine reference ranges provided were established   for 24 hour urine collections.  No reference ranges exist for  random urine specimens.  Correlate clinically.  (L)       DelSys          Differential Method          EF          eGFR if           eGFR if non            Eos #          Eosinophil%          Estimated Avg Glucose     114     FiO2          Large/Giant Platelets          Glucose          Glucose, UA    SEE COMMENT  Comment:  Color may interfere with results      Gram Stain (Respiratory)  <10 epithelial cells per low power field.          Rare WBC's          Rare budding yeast        Gran # (ANC)          Gran%          Hematocrit          Hemoglobin          Hemoglobin A1C     5.6  Comment:  ADA Screening Guidelines:  5.7-6.4%  Consistent with prediabetes  >or=6.5%  Consistent with diabetes  High levels of fetal hemoglobin interfere with the HbA1C  assay. Heterozygous hemoglobin variants (HbS, HgC, etc)do  not significantly interfere with this assay.   However, presence of multiple variants may affect accuracy.       Hyaline Casts, UA    SEE COMMENT  Comment:  Present, unable to quantitate due to obscuring amounts of other   cellular elements  (A)      Hypo          Ketones, UA    SEE COMMENT  Comment:  Color may interfere with results      Lactate, Yasir          Leukocytes, UA    SEE COMMENT  Comment:  Color may interfere with results      Lymph #          Lymph%          Magnesium          MCH          MCHC          MCV          Microscopic Comment    SEE COMMENT  Comment:  Other formed elements not mentioned in the report are not   present in the microscopic examination.         Mode          Mono #          Mono%          MPV          Mitral Valve Mobility          Nitrite, UA    SEE COMMENT  Comment:  Color may interfere with results      Non-Squam Epith    SEE COMMENT  Comment:  Present, unable to quantitate due to obscuring amounts of other   cellular elements  (A)      Occult Blood UA    SEE COMMENT  Comment:  Color may interfere with results      Opiate Scrn, Ur   Negative       Ovalocytes          Est. PA Systolic Pressure          PEEP          pH, UA    SEE COMMENT  Comment:  Color may interfere with results      Phosphorus           Platelet Estimate          Platelets          POC BE          POC HCO3          POC PCO2          POC PH          POC PO2          POC SATURATED O2          POCT Glucose 143(H)         Poik          Poly          Potassium          Total Protein          Protein, UA    SEE COMMENT  Comment:  Recommend a 24 hour urine protein or a urine   protein/creatinine ratio if globulin induced proteinuria is  clinically suspected.  Color may interfere with results        Rate          RBC          RBC, UA    >100(H)      RDW          Sample          Sodium          Specific [place], UA    SEE COMMENT  Comment:  Color may interfere with results      Specimen UA    Urine, Catheterized      Spherocytes          Squam Epithel, UA    SEE COMMENT  Comment:  Present, unable to quantitate due to obscuring amounts of other   cellular elements        Target Cells          Tear Drop Cells          Marijuana (THC) Metabolite   Negative       Toxicology Information   SEE COMMENT  Comment:  This screen includes the following classes of drugs at the   listed cut-off:  Benzodiazepines                  200 ng/ml  Methadone                        300 ng/ml  Cocaine metabolite               300 ng/ml  Opiates                          300 ng/ml  Barbiturates                     200 ng/ml  Amphetamines                    1000 ng/ml  Marijuana metabs (THC)            50 ng/ml  Phencyclidine (PCP)               25 ng/ml  High concentrations of Diphenhydramine may cross-react with  Phencyclidine PCP screening immunoassay giving a false   positive result.  High concentrations of Methylenedioxymethamphetamine (MDMA aka  Ectasy) and other structurally similar compounds may cross-   react with the Amphetamine/Methamphetamine screening   immunoassay giving a false positive result.  A metabolite of the anti-HIV drug Sustiva () may cause  false positive results in the Marijuana metabolite (THC)   screening assay.  Note: This exception list includes  only more common   interferants in toxicology screen testing.  Because of many   cross-reactantspositive results on toxicology drug screens   should be confirmed whenever results do not correlate with   clinical presentation.  This report is intended for use in clinical monitoring and  management of patients. It is not intended for use in   employment related drug testing.  Because of any cross-reactants, positive results on toxicology  drug screens should be confirmed whenever results do not  correlate with clinical presentation.  Presumptive positive results are unconfirmed and may be used   only for medical purposes.         Tricuspid Valve Regurgitation          Urobilinogen, UA    SEE COMMENT  Comment:  Color may interfere with results      Vancomycin, Random          Vt          WBC Clumps, UA    Moderate(A)      WBC, UA    >100(H)      WBC          Yeast, UA    None                  07/11/18  1850 07/11/18  1640 07/11/18  1536 07/11/18  1511      Benzodiazepines         Methadone metabolites         Phencyclidine         Albumin   2.6(L)      Alkaline Phosphatase   105      Allens Test    N/A     ALT   43      Amorphous, UA         Amphetamine Screen, Ur         Anion Gap   17(H)      Aniso         Appearance, UA         AST   75(H)      Aortic Valve Stenosis  MODERATE(A)       Bacteria, UA         Barbiturate Screen, Ur         Baso #   0.01      Basophil%   0.1      Bilirubin (UA)         Total Bilirubin   0.8  Comment:  For infants and newborns, interpretation of results should be based  on gestational age, weight and in agreement with clinical  observations.  Premature Infant recommended reference ranges:  Up to 24 hours.............<8.0 mg/dL  Up to 48 hours............<12.0 mg/dL  3-5 days..................<15.0 mg/dL  6-29 days.................<15.0 mg/dL        Site    RR     BUN, Bld   24(H)      Vera Cells         Calcium   10.5      Chloride   97      CO2   25      Cocaine (Metab.)         Color,  UA         Creatinine   3.8(H)      Creatinine, Random Ur         DelSys    Adult Vent     Differential Method   Automated      EF  60       eGFR if    13(A)      eGFR if non    12  Comment:  Calculation used to obtain the estimated glomerular filtration  rate (eGFR) is the CKD-EPI equation.   (A)      Eos #   0.0      Eosinophil%   0.0      Estimated Avg Glucose         FiO2    100     Large/Giant Platelets         Glucose   99      Glucose, UA         Gram Stain (Respiratory)         Gran # (ANC)   11.1(H)      Gran%   94.3(H)      Hematocrit   37.0      Hemoglobin   10.9(L)      Hemoglobin A1C         Hyaline Casts, UA         Hypo         Ketones, UA         Lactate, Yasir   3.3  Comment:  Falsely low lactic acid results can be found in samples   containing >=13.0 mg/dL total bilirubin and/or >=3.5 mg/dL   direct bilirubin.  (H)      Leukocytes, UA         Lymph #   0.4(L)      Lymph%   3.7(L)      Magnesium   2.0      MCH   24.2(L)      MCHC   29.5(L)      MCV   82      Microscopic Comment         Mode    AC/PRVC     Mono #   0.2(L)      Mono%   1.9(L)      MPV   9.3      Mitral Valve Mobility  MILDLY RESTRICTED       Nitrite, UA         Non-Squam Epith         Occult Blood UA         Opiate Scrn, Ur         Ovalocytes         Est. PA Systolic Pressure  59.09(A)       PEEP    5     pH, UA         Phosphorus         Platelet Estimate         Platelets   165      POC BE    7     POC HCO3    31.7(H)     POC PCO2    49.3(H)     POC PH    7.416     POC PO2    138(H)     POC SATURATED O2    99     POCT Glucose 114(H)        Poik         Poly         Potassium   4.2      Total Protein   6.7      Protein, UA         Rate    22     RBC   4.51      RBC, UA         RDW   19.2(H)      Sample    ARTERIAL     Sodium   139      Specific Gravity, UA         Specimen UA         Spherocytes         Squam Epithel, UA         Target Cells         Tear Drop Cells         Marijuana (THC) Metabolite          Toxicology Information         Tricuspid Valve Regurgitation  MILD TO MODERATE       Urobilinogen, UA         Vancomycin, Random         Vt    300     WBC Clumps, UA         WBC, UA         WBC   11.76      Yeast, UA               Significant Imaging: Echocardiogram:   2D echo with color flow doppler:   Results for orders placed or performed during the hospital encounter of 07/11/18   2D echo with color flow doppler   Result Value Ref Range    EF 60 55 - 65    Aortic Valve Stenosis MODERATE (A)     Est. PA Systolic Pressure 59.09 (A)     Mitral Valve Mobility MILDLY RESTRICTED     Tricuspid Valve Regurgitation MILD TO MODERATE     and X-Ray: CXR: X-Ray Chest 1 View (CXR):   Results for orders placed or performed during the hospital encounter of 07/11/18   X-Ray Chest 1 View    Narrative    EXAMINATION:  XR CHEST 1 VIEW    CLINICAL HISTORY:  intubation;    COMPARISON:  07/11/2018    FINDINGS:  There has been interval placement of an NG tube.  The tip is not included on the film.  An endotracheal tube remains in place.  There is mild cardiomegaly.  There is prominence of the right hilar region.  There is a moderate amount of alveolar consolidation scattered throughout the left lung.  There is no pneumothorax.  The costophrenic angles are sharp.      Impression    1. There has been interval placement of an NG tube. The tip is not included on the film.  2. There is a moderate amount of alveolar consolidation scattered throughout the left lung. There is prominence of the right hilar region.  This is characteristic of pneumonia and adenopathy.  I recommend radiographic follow-up until complete resolution of the lungs.  3. There is mild cardiomegaly.  4. An endotracheal tube remains in place.  .      Electronically signed by: Colton Gaviria MD  Date:    07/12/2018  Time:    08:20

## 2018-07-12 NOTE — ASSESSMENT & PLAN NOTE
- multilobar pneumonia on broad spectrum antibotics   - also possible oste in toe will order mri once patient stable  - continue with antibotics and follow up on cultures   7/12  Continue with zosyn and vancomycin  Blood culture gram negative rods

## 2018-07-12 NOTE — PROGRESS NOTES
Called Clinic. Clinic closed early today.  [facility] [place].

## 2018-07-12 NOTE — PROGRESS NOTES
[facility] -   Nephrology  Progress Note    Patient Name: Talia Mcfarland  MRN: [id number]  Admission Date: 7/11/2018  Hospital Length of Stay: 1 days  Attending Provider: Zayda England MD   Primary Care Physician: Tank Castro MD  Principal Problem:HAP (hospital-acquired pneumonia)    Subjective:     HPI: Talia Mcfarland is a 78 y old AA woman with h/o HTN, PAD , [place] on [place] ( [place] [facility] [place] [facility] [place] , DM-2 , highly noncompliant with outpatient dialysis treatments, patient misses at least 1 treatment every week for outpatient dialysis team, patient went to hemodialysis treatment today, she had some fever and nausea at the dialysis unit, and a set of blood cultures were drawn and patient received a g of vancomycin and was sent to the emergency room, while she was in the ER here patient developed cardiac arrest requiring CPR and was intubated.  Patient started on broad-spectrum antibiotics.  She also has a wound on her left ft, significant peripheral edema noted likely due to nonadherence with dialysis.  CT chest done on admission showed significant left-sided pneumonia.    Interval History:  Remains critically ill,     Review of patient's allergies indicates:   Allergen Reactions    Tylenol [acetaminophen] Other (See Comments)     Headache     Current Facility-Administered Medications   Medication Frequency    acetylcysteine 100 mg/ml (10%) solution 4 mL BID    albuterol-ipratropium 2.5 mg-0.5 mg/3 mL nebulizer solution 3 mL Q6H    bisacodyl suppository 10 mg Daily PRN    chlorhexidine 0.12 % solution 15 mL BID    dextrose 50% injection 12.5 g PRN    famotidine (PF) injection 20 mg Daily    fentaNYL 2500 mcg in D5W 250 mL infusion premix (titrating) (conc: 10 mcg/mL) Continuous    glucagon (human recombinant) injection 1 mg PRN    heparin (porcine) injection 5,000 Units Q8H    insulin aspart U-100 pen 0-5 Units Q6H PRN    piperacillin-tazobactam 4.5 g in dextrose 5 % 100 mL IVPB (ready to  mix system) Q12H    polyethylene glycol packet 17 g Daily    [START ON 7/13/2018] vancomycin 750 mg in dextrose 5 % 250 mL IVPB (ready to mix system) Q48H       Objective:     Vital Signs (Most Recent):  Temp: 98.9 °F (37.2 °C) (07/12/18 1100)  Pulse: 87 (07/12/18 1208)  Resp: 20 (07/12/18 1208)  BP: (!) 109/59 (07/12/18 1130)  SpO2: 100 % (07/12/18 1208)  O2 Device (Oxygen Therapy): ventilator (07/12/18 1208) Vital Signs (24h Range):  Temp:  [98 °F (36.7 °C)-100.8 °F (38.2 °C)] 98.9 °F (37.2 °C)  Pulse:  [] 87  Resp:  [16-34] 20  SpO2:  [94 %-100 %] 100 %  BP: ()/(37-75) 109/59     Weight: 54.2 kg (119 lb 7.8 oz) (07/11/18 2045)  Body mass index is 24.13 kg/m².  Body surface area is 1.5 meters squared.    I/O last 3 completed shifts:  In: 1036.2 [I.V.:636.2; IV Piggyback:400]  Out: 212 [Urine:12; Drains:200]    Physical Exam   Constitutional: She appears well-developed.   HENT:   Head: Normocephalic and atraumatic.   Mouth/Throat: Oropharynx is clear and moist. No oropharyngeal exudate.   ETT in place   Eyes: Conjunctivae are normal.   Neck: No JVD present. Carotid bruit is not present. No tracheal deviation present. No thyroid mass and no thyromegaly present.   Cardiovascular: Normal rate, regular rhythm and intact distal pulses.  Exam reveals no gallop and no friction rub.    Murmur heard.  Pulmonary/Chest: No respiratory distress. She has wheezes. She has rales. She exhibits no tenderness.   Abdominal: Soft. Bowel sounds are normal. She exhibits no distension, no abdominal bruit, no ascites and no mass. There is no hepatosplenomegaly. There is no tenderness. There is no rebound, no guarding and no CVA tenderness.   Musculoskeletal: She exhibits edema. She exhibits no tenderness.   Lymphadenopathy:     She has no cervical adenopathy.   Neurological: No cranial nerve deficit. She exhibits normal muscle tone. Coordination normal.   Skin: Skin is warm and intact. No rash noted. No erythema. No pallor.        Significant Labs:  CBC:   Recent Labs  Lab 07/12/18  0350   WBC 7.14   RBC 4.47   HGB 10.9*   HCT 36.5*   *   MCV 82   MCH 24.4*   MCHC 29.9*     CMP:   Recent Labs  Lab 07/12/18  0350   *   CALCIUM 9.5   ALBUMIN 2.4*   PROT 6.5      K 4.4   CO2 20*   CL 97   BUN 29*   CREATININE 4.4*   ALKPHOS 82   ALT 32   AST 47*   BILITOT 0.9     Coagulation: No results for input(s): PT, INR, APTT in the last 168 hours.  LFTs:   Recent Labs  Lab 07/12/18  0350   ALT 32   AST 47*   ALKPHOS 82   BILITOT 0.9   PROT 6.5   ALBUMIN 2.4*     All labs within the past 24 hours have been reviewed.     Lab Results   Component Value Date    CALCIUM 9.5 07/12/2018    PHOS 5.0 (H) 07/12/2018           Significant Imaging: reviewed     Assessment/Plan:     ESRD on hemodialysis    1. ESRD on HD ( [place]WF , [facility] [place] [facility] [place] - had short treatment of hemodialysis yesterday, CXR noted, pulm edema , will plan HD today and UF as tolerated ,     Assess in AM for regular HD ,     2.  Respiratory failure - following cardiac arrest requiring brief CPR, significant pneumonia noted on the CT scan of the chest, continue broad-spectrum antibiotics, continue ventilator support, UF on HD as tolerated ,     3. S/p cardiac arrest - cardiology note reviewed,     4. HTN - BP controlled, on low side,     5. Anemia of CKD - stable Hb     6. Edema - due to nonadherence with dialysis treatments as outpatient, likely nonadherence with salt and fluid restriction, UF as tolerated ,     7.  Wound on her left foot - continue antibiotics and wound care.             I will follow-up with patient. Please contact us if you have any additional questions.     Total critical care time spent 40 minutes including time needed to review the records,  patient  evaluation, documentation, face-to-face discussion with the primary and CC teams,   more than 50% of the time was spent on coordination of care and counseling.       Riky Bautista MD  Nephrology  [facility] -

## 2018-07-12 NOTE — HPI
68-year-old female patient brought by EMS to the emergency department from dialysis after she complained of malaise and had altered mental status after dialysis today.

## 2018-07-12 NOTE — PROGRESS NOTES
Size wise bed speciality bed ordered for pt (evolution SW rotate).  Spoke with Risa and confirmation number is [id number].  Will wait for bed to be delivered.

## 2018-07-12 NOTE — SUBJECTIVE & OBJECTIVE
Review of Systems   Unable to perform ROS: Intubated     Objective:     Vital Signs (Most Recent):  Temp: 97.9 °F (36.6 °C) (07/12/18 1500)  Pulse: 74 (07/12/18 1700)  Resp: (!) 27 (07/12/18 1700)  BP: (!) 112/49 (07/12/18 1700)  SpO2: 100 % (07/12/18 1700) Vital Signs (24h Range):  Temp:  [97.9 °F (36.6 °C)-100.8 °F (38.2 °C)] 97.9 °F (36.6 °C)  Pulse:  [30-98] 74  Resp:  [16-34] 27  SpO2:  [94 %-100 %] 100 %  BP: ()/(37-75) 112/49     Weight: 54.2 kg (119 lb 7.8 oz)  Body mass index is 24.13 kg/m².    Intake/Output Summary (Last 24 hours) at 07/12/18 1724  Last data filed at 07/12/18 1500   Gross per 24 hour   Intake          1292.02 ml   Output              212 ml   Net          1080.02 ml      Physical Exam   Constitutional:   Ill appearing, frail   HENT:   ETT +    Eyes:   Sluggish pupillary reaction to light   Neck: Neck supple.   Cardiovascular:   Tachy at 87    Pulmonary/Chest: Effort normal. No respiratory distress.   + ronchi bilaterally    Abdominal: Soft. There is no tenderness.   Musculoskeletal: She exhibits no edema.   Neurological:   Unresponsive , not moving extremities    Skin: Skin is warm.       Significant Labs: All pertinent labs within the past 24 hours have been reviewed.    Significant Imaging: I have reviewed all pertinent imaging results/findings within the past 24 hours.

## 2018-07-12 NOTE — CONSULTS
Pharmacy Vancomycin Consult Note    WBC=11.76  Tmax-100.8  CrCl=12.3ml/min Scr=3.8  Dialysis patient. Schedule MWF. Received short dialysis session today.    Cultures: pending  Dx. Sepsis/respiratory failure    Vancomycin 1gram loading dose was given in ER.   Vancomycin 750mg Q48 is a placeholder dose only and should not be given.  Vancomycin random level due 7/12 with AM labs.    Thank you for allowing us to participate in this patient's care.  Deneen Kelly, Pharm D 7/11/2018 7:35 PM

## 2018-07-12 NOTE — ASSESSMENT & PLAN NOTE
O2/mechanical ventilation/pulmonary toilet  Daily ABG and chest x-ray  After post intubation ABG, tidal volume adjust to 350, FiO2 70%, keep respiratory rate at 22, peep 5 cm water.  7/12O2/mechanical ventilation/pulmonary toilet , Daily ABG and chest x-ray. Decrease RR to 16

## 2018-07-12 NOTE — SUBJECTIVE & OBJECTIVE
Interval History:   Review of Systems   Unable to perform ROS: Intubated       Objective:     Vital Signs (Most Recent):  Temp: 98.9 °F (37.2 °C) (07/12/18 1100)  Pulse: 87 (07/12/18 1208)  Resp: 20 (07/12/18 1208)  BP: (!) 109/59 (07/12/18 1130)  SpO2: 100 % (07/12/18 1208) Vital Signs (24h Range):  Temp:  [98 °F (36.7 °C)-100.8 °F (38.2 °C)] 98.9 °F (37.2 °C)  Pulse:  [] 87  Resp:  [16-34] 20  SpO2:  [94 %-100 %] 100 %  BP: ()/(37-75) 109/59     Weight: 54.2 kg (119 lb 7.8 oz)  Body mass index is 24.13 kg/m².      Intake/Output Summary (Last 24 hours) at 07/12/18 1336  Last data filed at 07/12/18 1100   Gross per 24 hour   Intake          1262.02 ml   Output              212 ml   Net          1050.02 ml       Physical Exam   Constitutional:   Ill appearing, frail   HENT:   ETT +    Eyes:   Sluggish pupillary reaction to light   Neck: Neck supple.   Cardiovascular:   Tachy at 87    Pulmonary/Chest: Effort normal. No respiratory distress.   + ronchi bilaterally    Abdominal: Soft. There is no tenderness.   Musculoskeletal: She exhibits no edema.   Neurological:   Unresponsive , not moving extremities    Skin: Skin is warm.       Vents:  Vent Mode: A/C (07/12/18 1054)  Ventilator Initiated: Yes (07/11/18 1454)  Set Rate: 20 bmp (07/12/18 1054)  Vt Set: 350 mL (07/12/18 1054)  Pressure Support: 0 cmH20 (07/12/18 1054)  PEEP/CPAP: 5 cmH20 (07/12/18 1054)  Oxygen Concentration (%): 50 (07/12/18 1208)  Peak Airway Pressure: 31 cmH2O (07/12/18 1054)  Plateau Pressure: 0 cmH20 (07/12/18 1054)  Total Ve: 6.01 mL (07/12/18 1054)  F/VT Ratio<105 (RSBI): (!) 66.67 (07/12/18 1054)    Lines/Drains/Airways     Drain                 Hemodialysis AV Fistula Left upper arm -- days         NG/OG Tube 07/11/18 1811 orogastric 18 Fr. Left mouth less than 1 day         Urethral Catheter 07/11/18 1445 Latex 16 Fr. less than 1 day          Airway                 Airway - Non-Surgical 07/11/18 1426 Endotracheal Tube less than 1  day          Pressure Ulcer                 Pressure Injury 07/12/18 Left Heel Deep tissue injury less than 1 day         Pressure Injury 07/12/18 Right Heel Deep tissue injury less than 1 day          Peripheral Intravenous Line                 Peripheral IV - Single Lumen 07/11/18 1240 Right Forearm 1 day         Peripheral IV - Single Lumen 07/11/18 1438 Right  less than 1 day                Significant Labs:    CBC/Anemia Profile:    Recent Labs  Lab 07/11/18  1240 07/11/18  1536 07/12/18  0350   WBC 15.10* 11.76 7.14   HGB 12.0 10.9* 10.9*   HCT 40.6 37.0 36.5*    165 149*   MCV 82 82 82   RDW 19.7* 19.2* 19.6*        Chemistries:    Recent Labs  Lab 07/11/18  1240 07/11/18  1536 07/12/18  0350    139 138   K 5.4* 4.2 4.4   CL 95 97 97   CO2 25 25 20*   BUN 20 24* 29*   CREATININE 3.6* 3.8* 4.4*   CALCIUM 9.8 10.5 9.5   ALBUMIN 2.9* 2.6* 2.4*   PROT 8.1 6.7 6.5   BILITOT 0.6 0.8 0.9   ALKPHOS 76 105 82   ALT 11 43 32   AST 17 75* 47*   MG  --  2.0 2.0   PHOS  --   --  5.0*     Significant Imaging:  CXR: I have reviewed all pertinent results/findings within the past 24 hours and my personal findings are:  ETT okay, stable bilateral infiltrates Left > right   Bld cx GNR

## 2018-07-12 NOTE — ASSESSMENT & PLAN NOTE
-nephrology on board   - case discussed with  nephrology   7/12  Nephrology on board currently getting dialysis

## 2018-07-12 NOTE — PLAN OF CARE
Problem: Patient Care Overview  Goal: Plan of Care Review  Outcome: Ongoing (interventions implemented as appropriate)  Pt stable. Pt is NSR on the heart monitor.  Temp max 100.3.  Pt remains intubated, sedation turned off this AM around 0420 d/t pt BP low, BP improved with sedation.  Pt withdrawing to pain and repeated verbal/touch stimulus, not following commands.  Pt on D5 NS at 50ml/hr, pt received scheduled abx.  Martinez in place draining thick mucous/blood tinged urine, total UOP 5ml this shift, martinez flushed with 25ml sterile water, flush returned.  OG tube drained thin green fluid most of shift, early this AM dark red/brown fluid noted, fluid now green with occasional blood tinged; OG tube to low intermittent wall suction; total output this shift 200ml.  Pt turned and repositioned with use of pillows and wedge.  PIV and right EJ intact with no redness, swelling or drainage.  Bed low, wheels locked, bed alarm on.  Johan soft wrist restrains in place for pt safety with lines and ET tube.  Plan of care reviewed. Will continue to monitor.

## 2018-07-12 NOTE — ASSESSMENT & PLAN NOTE
ICU monitoring  Coiling protocol not applied, patient had PA, non shockable rhythm.  Cardiology consult

## 2018-07-13 LAB
ALBUMIN SERPL BCP-MCNC: 2.3 G/DL
ALLENS TEST: ABNORMAL
ALP SERPL-CCNC: 93 U/L
ALT SERPL W/O P-5'-P-CCNC: 26 U/L
ANION GAP SERPL CALC-SCNC: 18 MMOL/L
ANISOCYTOSIS BLD QL SMEAR: SLIGHT
AST SERPL-CCNC: 27 U/L
BASOPHILS # BLD AUTO: ABNORMAL K/UL
BASOPHILS NFR BLD: 0 %
BILIRUB SERPL-MCNC: 1.2 MG/DL
BUN SERPL-MCNC: 25 MG/DL
BURR CELLS BLD QL SMEAR: ABNORMAL
CALCIUM SERPL-MCNC: 9.6 MG/DL
CHLORIDE SERPL-SCNC: 99 MMOL/L
CO2 SERPL-SCNC: 23 MMOL/L
CREAT SERPL-MCNC: 3.1 MG/DL
DACRYOCYTES BLD QL SMEAR: ABNORMAL
DELSYS: ABNORMAL
DIFFERENTIAL METHOD: ABNORMAL
EOSINOPHIL # BLD AUTO: ABNORMAL K/UL
EOSINOPHIL NFR BLD: 0 %
ERYTHROCYTE [DISTWIDTH] IN BLOOD BY AUTOMATED COUNT: 19.8 %
ERYTHROCYTE [SEDIMENTATION RATE] IN BLOOD BY WESTERGREN METHOD: 20 MM/H
EST. GFR  (AFRICAN AMERICAN): 17 ML/MIN/1.73 M^2
EST. GFR  (NON AFRICAN AMERICAN): 15 ML/MIN/1.73 M^2
FIO2: 45
GIANT PLATELETS BLD QL SMEAR: PRESENT
GLUCOSE SERPL-MCNC: 150 MG/DL
HBV SURFACE AG SERPL QL IA: NEGATIVE
HCO3 UR-SCNC: 28.8 MMOL/L (ref 24–28)
HCT VFR BLD AUTO: 34.1 %
HGB BLD-MCNC: 10.3 G/DL
HYPOCHROMIA BLD QL SMEAR: ABNORMAL
LYMPHOCYTES # BLD AUTO: ABNORMAL K/UL
LYMPHOCYTES NFR BLD: 10 %
MAGNESIUM SERPL-MCNC: 1.8 MG/DL
MCH RBC QN AUTO: 24.2 PG
MCHC RBC AUTO-ENTMCNC: 30.2 G/DL
MCV RBC AUTO: 80 FL
METAMYELOCYTES NFR BLD MANUAL: 15 %
MODE: ABNORMAL
MONOCYTES # BLD AUTO: ABNORMAL K/UL
MONOCYTES NFR BLD: 4 %
MYELOCYTES NFR BLD MANUAL: 14 %
NEUTROPHILS NFR BLD: 27 %
NEUTS BAND NFR BLD MANUAL: 30 %
OVALOCYTES BLD QL SMEAR: ABNORMAL
PATH REV BLD -IMP: NORMAL
PCO2 BLDA: 40.1 MMHG (ref 35–45)
PEEP: 8
PH SMN: 7.46 [PH] (ref 7.35–7.45)
PHOSPHATE SERPL-MCNC: 3.3 MG/DL
PLATELET # BLD AUTO: 121 K/UL
PLATELET BLD QL SMEAR: ABNORMAL
PMV BLD AUTO: ABNORMAL FL
PO2 BLDA: 158 MMHG (ref 80–100)
POC BE: 5 MMOL/L
POC SATURATED O2: 100 % (ref 95–100)
POCT GLUCOSE: 130 MG/DL (ref 70–110)
POCT GLUCOSE: 147 MG/DL (ref 70–110)
POCT GLUCOSE: 159 MG/DL (ref 70–110)
POCT GLUCOSE: 162 MG/DL (ref 70–110)
POIKILOCYTOSIS BLD QL SMEAR: SLIGHT
POLYCHROMASIA BLD QL SMEAR: ABNORMAL
POTASSIUM SERPL-SCNC: 4.1 MMOL/L
PROT SERPL-MCNC: 6.2 G/DL
RBC # BLD AUTO: 4.26 M/UL
SAMPLE: ABNORMAL
SCHISTOCYTES BLD QL SMEAR: PRESENT
SITE: ABNORMAL
SODIUM SERPL-SCNC: 140 MMOL/L
SPHEROCYTES BLD QL SMEAR: ABNORMAL
STOMATOCYTES BLD QL SMEAR: PRESENT
TARGETS BLD QL SMEAR: ABNORMAL
VANCOMYCIN SERPL-MCNC: 26 UG/ML
VT: 350
WBC # BLD AUTO: 13.43 K/UL

## 2018-07-13 PROCEDURE — 25000003 PHARM REV CODE 250: Performed by: NURSE PRACTITIONER

## 2018-07-13 PROCEDURE — 63600175 PHARM REV CODE 636 W HCPCS: Mod: JG | Performed by: INTERNAL MEDICINE

## 2018-07-13 PROCEDURE — P9047 ALBUMIN (HUMAN), 25%, 50ML: HCPCS | Mod: JG | Performed by: INTERNAL MEDICINE

## 2018-07-13 PROCEDURE — 25000003 PHARM REV CODE 250: Performed by: INTERNAL MEDICINE

## 2018-07-13 PROCEDURE — 63600175 PHARM REV CODE 636 W HCPCS: Performed by: INTERNAL MEDICINE

## 2018-07-13 PROCEDURE — 94668 MNPJ CHEST WALL SBSQ: CPT

## 2018-07-13 PROCEDURE — 82803 BLOOD GASES ANY COMBINATION: CPT

## 2018-07-13 PROCEDURE — 25000242 PHARM REV CODE 250 ALT 637 W/ HCPCS: Performed by: NURSE PRACTITIONER

## 2018-07-13 PROCEDURE — 83735 ASSAY OF MAGNESIUM: CPT

## 2018-07-13 PROCEDURE — 99291 CRITICAL CARE FIRST HOUR: CPT | Mod: ,,, | Performed by: INTERNAL MEDICINE

## 2018-07-13 PROCEDURE — 80100016 HC MAINTENANCE HEMODIALYSIS

## 2018-07-13 PROCEDURE — 84100 ASSAY OF PHOSPHORUS: CPT

## 2018-07-13 PROCEDURE — S0028 INJECTION, FAMOTIDINE, 20 MG: HCPCS | Performed by: INTERNAL MEDICINE

## 2018-07-13 PROCEDURE — 85027 COMPLETE CBC AUTOMATED: CPT

## 2018-07-13 PROCEDURE — 90935 HEMODIALYSIS ONE EVALUATION: CPT | Mod: ,,, | Performed by: INTERNAL MEDICINE

## 2018-07-13 PROCEDURE — 80053 COMPREHEN METABOLIC PANEL: CPT

## 2018-07-13 PROCEDURE — 20000000 HC ICU ROOM

## 2018-07-13 PROCEDURE — 80202 ASSAY OF VANCOMYCIN: CPT

## 2018-07-13 PROCEDURE — 95816 EEG AWAKE AND DROWSY: CPT

## 2018-07-13 PROCEDURE — 36600 WITHDRAWAL OF ARTERIAL BLOOD: CPT

## 2018-07-13 PROCEDURE — 94003 VENT MGMT INPAT SUBQ DAY: CPT

## 2018-07-13 PROCEDURE — 99900035 HC TECH TIME PER 15 MIN (STAT)

## 2018-07-13 PROCEDURE — 36415 COLL VENOUS BLD VENIPUNCTURE: CPT

## 2018-07-13 PROCEDURE — 94640 AIRWAY INHALATION TREATMENT: CPT

## 2018-07-13 PROCEDURE — 63600175 PHARM REV CODE 636 W HCPCS: Performed by: PSYCHIATRY & NEUROLOGY

## 2018-07-13 PROCEDURE — 85007 BL SMEAR W/DIFF WBC COUNT: CPT

## 2018-07-13 PROCEDURE — 85060 BLOOD SMEAR INTERPRETATION: CPT | Mod: ,,, | Performed by: PATHOLOGY

## 2018-07-13 RX ORDER — LEVETIRACETAM 10 MG/ML
1000 INJECTION INTRAVASCULAR ONCE
Status: COMPLETED | OUTPATIENT
Start: 2018-07-13 | End: 2018-07-13

## 2018-07-13 RX ORDER — LEVETIRACETAM 5 MG/ML
500 INJECTION INTRAVASCULAR 2 TIMES DAILY
Status: DISCONTINUED | OUTPATIENT
Start: 2018-07-13 | End: 2018-07-14

## 2018-07-13 RX ADMIN — SODIUM CHLORIDE 3 G: 9 INJECTION, SOLUTION INTRAVENOUS at 06:07

## 2018-07-13 RX ADMIN — LEVETIRACETAM 500 MG: 5 INJECTION INTRAVENOUS at 10:07

## 2018-07-13 RX ADMIN — CHLORHEXIDINE GLUCONATE 15 ML: 1.2 RINSE ORAL at 09:07

## 2018-07-13 RX ADMIN — HEPARIN SODIUM 5000 UNITS: 5000 INJECTION, SOLUTION INTRAVENOUS; SUBCUTANEOUS at 02:07

## 2018-07-13 RX ADMIN — HEPARIN SODIUM 5000 UNITS: 5000 INJECTION, SOLUTION INTRAVENOUS; SUBCUTANEOUS at 12:07

## 2018-07-13 RX ADMIN — ALBUMIN HUMAN 12.5 G: 0.25 SOLUTION INTRAVENOUS at 11:07

## 2018-07-13 RX ADMIN — FAMOTIDINE 20 MG: 10 INJECTION INTRAVENOUS at 09:07

## 2018-07-13 RX ADMIN — POLYETHYLENE GLYCOL (3350) 17 G: 17 POWDER, FOR SOLUTION ORAL at 09:07

## 2018-07-13 RX ADMIN — ACETYLCYSTEINE 4 ML: 100 SOLUTION ORAL; RESPIRATORY (INHALATION) at 08:07

## 2018-07-13 RX ADMIN — IPRATROPIUM BROMIDE AND ALBUTEROL SULFATE 3 ML: .5; 3 SOLUTION RESPIRATORY (INHALATION) at 12:07

## 2018-07-13 RX ADMIN — IPRATROPIUM BROMIDE AND ALBUTEROL SULFATE 3 ML: .5; 3 SOLUTION RESPIRATORY (INHALATION) at 07:07

## 2018-07-13 RX ADMIN — PIPERACILLIN AND TAZOBACTAM 4.5 G: 4; .5 INJECTION, POWDER, LYOPHILIZED, FOR SOLUTION INTRAVENOUS; PARENTERAL at 08:07

## 2018-07-13 RX ADMIN — CHLORHEXIDINE GLUCONATE 15 ML: 1.2 RINSE ORAL at 10:07

## 2018-07-13 RX ADMIN — IPRATROPIUM BROMIDE AND ALBUTEROL SULFATE 3 ML: .5; 3 SOLUTION RESPIRATORY (INHALATION) at 01:07

## 2018-07-13 RX ADMIN — ACETYLCYSTEINE 4 ML: 100 SOLUTION ORAL; RESPIRATORY (INHALATION) at 07:07

## 2018-07-13 RX ADMIN — HEPARIN SODIUM 5000 UNITS: 5000 INJECTION, SOLUTION INTRAVENOUS; SUBCUTANEOUS at 10:07

## 2018-07-13 RX ADMIN — LEVETIRACETAM 1000 MG: 10 INJECTION INTRAVENOUS at 12:07

## 2018-07-13 NOTE — CONSULTS
[facility]  Infectious Disease  Consult Note    Patient Name: Talia Mcfarland  MRN: [id number]  Admission Date: 7/11/2018  Hospital Length of Stay: 1 days  Attending Physician: Zayda England MD  Primary Care Provider: Tank Castro MD     Isolation Status: No active isolations    Patient information was obtained from past medical records and ER records.      Consults  Assessment/Plan:     * HAP (hospital-acquired pneumonia)    Will use cultures to guide therapy , continue vanco/zosyn        Blister of second toe of left foot    POA-wound care        DM type 2 (diabetes mellitus, type 2)    Insulin sliding scale        Severe sepsis    From pneumonia and gram negative bacteremia -Will follow ID of GNR.  Will continue Zosyn for now.        Acute respiratory failure    Continue ventilatory support .  Critical care follow up        ESRD on hemodialysis    Continue HD as per nephrology             Thank you for your consult. I will follow-up with patient. Please contact us if you have any additional questions.    Adolfo Ulloa MD  Infectious Disease  [facility]    Subjective:     Principal Problem: HAP (hospital-acquired pneumonia)    HPI: 66 year old  female with history of ESRD on HD, DM2, HTN, PVD, who presented to the ER from dialysis center with alert mental status .  Hospital course was complicated by PEA  Cardiac arrest .She is presently intubated and blood cultures are now positive for GNR.  CTA of the chest showed multilobar pneumonia.      Past Medical History:   Diagnosis Date    Anemia     CHF (congestive heart failure)     Diabetes mellitus     DVT (deep venous thrombosis)     Left leg    ESRD on hemodialysis 2 years 2012    Gastroparesis     Hypertension     Type II or unspecified type diabetes mellitus with renal manifestations, not stated as uncontrolled(250.40) 30 years     Urinary tract infection due to extended-spectrum beta lactamase (ESBL)-producing  Klebsiella        Past Surgical History:   Procedure Laterality Date     SECTION      CHOLECYSTECTOMY      KUSHAL Bilateral     LBP severe with multiple KUSHAL     ROTATOR CUFF REPAIR      UMBILICAL HERNIA REPAIR      VEIN BYPASS SURGERY Left        Review of patient's allergies indicates:   Allergen Reactions    Tylenol [acetaminophen] Other (See Comments)     Headache       Medications:  Facility-Administered Medications Prior to Admission   Medication    denosumab (PROLIA) injection 60 mg     Prescriptions Prior to Admission   Medication Sig    amLODIPine (NORVASC) 5 MG tablet TAKE 1 TABLET BY MOUTH DAILY    aspirin (ECOTRIN) 81 MG EC tablet Take 81 mg by mouth once daily.    carvedilol (COREG) 25 MG tablet Take 25 mg by mouth 2 (two) times daily with meals.    hydrocodone-acetaminophen 5-325mg (NORCO) 5-325 mg per tablet Take 1 tablet by mouth 2 (two) times daily as needed for Pain.    levothyroxine (SYNTHROID) 25 MCG tablet Take 25 mcg by mouth once daily.    lidocaine-prilocaine (EMLA) cream Apply topically as needed. APPLY TO TREATMENT AREA 1 HOUR PRIOR TO TREATMENT UTD    pantoprazole (PROTONIX) 20 MG tablet Take 1 tablet (20 mg total) by mouth once daily.    promethazine (PHENERGAN) 25 MG tablet Take 1 tablet (25 mg total) by mouth every 6 (six) hours as needed for Nausea.    ropinirole (REQUIP) 0.5 MG tablet Take 1 tablet (0.5 mg total) by mouth 3 (three) times daily.     Antibiotics     Start     Stop Route Frequency Ordered    18 1600  vancomycin 750 mg in dextrose 5 % 250 mL IVPB (ready to mix system)      -- IV Every 48 hours (non-standard times) 18 1927    18 0200  piperacillin-tazobactam 4.5 g in dextrose 5 % 100 mL IVPB (ready to mix system)      -- IV Every 12 hours (non-standard times) 18 1740        Antifungals     None        Antivirals     None             There is no immunization history on file for this patient.    Family History     None         Social History     Social History    Marital status:      Spouse name: N/A    Number of children: N/A    Years of education: N/A     Social History Main Topics    Smoking status: Never Smoker    Smokeless tobacco: Never Used    Alcohol use No    Drug use: No    Sexual activity: Not Asked     Other Topics Concern    None     Social History Narrative    None     Review of Systems   Unable to perform ROS: Intubated     Objective:     Vital Signs (Most Recent):  Temp: 97.9 °F (36.6 °C) (07/12/18 1500)  Pulse: 88 (07/12/18 2129)  Resp: 20 (07/12/18 2129)  BP: (!) 123/52 (07/12/18 1806)  SpO2: 100 % (07/12/18 2129) Vital Signs (24h Range):  Temp:  [97.9 °F (36.6 °C)-99.7 °F (37.6 °C)] 97.9 °F (36.6 °C)  Pulse:  [30-98] 88  Resp:  [17-30] 20  SpO2:  [96 %-100 %] 100 %  BP: ()/(37-75) 123/52     Weight: 54.2 kg (119 lb 7.8 oz)  Body mass index is 24.13 kg/m².    Estimated Creatinine Clearance: 10.5 mL/min (A) (based on SCr of 4.4 mg/dL (H)).    Physical Exam   Constitutional: She appears well-developed.   HENT:   Head: Normocephalic and atraumatic.   Mouth/Throat: Oropharynx is clear and moist. No oropharyngeal exudate.   ETT in place   Eyes: Conjunctivae are normal.   Neck: No JVD present. Carotid bruit is not present. No tracheal deviation present. No thyroid mass and no thyromegaly present.   Cardiovascular: Normal rate, regular rhythm and intact distal pulses.  Exam reveals no gallop and no friction rub.    Murmur heard.  Pulmonary/Chest: No respiratory distress. She has wheezes. She has rales. She exhibits no tenderness.   Abdominal: Soft. Bowel sounds are normal. She exhibits no distension, no abdominal bruit, no ascites and no mass. There is no hepatosplenomegaly. There is no tenderness. There is no rebound, no guarding and no CVA tenderness.   Musculoskeletal: She exhibits edema. She exhibits no tenderness.   Lymphadenopathy:     She has no cervical adenopathy.   Neurological: No cranial  nerve deficit. She exhibits normal muscle tone. Coordination normal.   Skin: Skin is warm and intact. No rash noted. No erythema. No pallor.   Nursing note and vitals reviewed.      Significant Labs:   Blood Culture:   Recent Labs  Lab 07/11/18  1220 07/11/18  1240   LABBLOO Gram stain aer bottle: Gram negative rods   Results called to and read back by: Robert Staples RN 07/12/2018  06:20 Gram stain aer bottle: Gram negative rods   Results called to and read back by: Robert Staples RN 07/12/2018  06:20     BMP:   Recent Labs  Lab 07/12/18  0350   *      K 4.4   CL 97   CO2 20*   BUN 29*   CREATININE 4.4*   CALCIUM 9.5   MG 2.0     CBC:   Recent Labs  Lab 07/11/18  1240 07/11/18  1536 07/12/18  0350   WBC 15.10* 11.76 7.14   HGB 12.0 10.9* 10.9*   HCT 40.6 37.0 36.5*    165 149*     All pertinent labs within the past 24 hours have been reviewed.    Significant Imaging: I have reviewed all pertinent imaging results/findings within the past 24 hours.

## 2018-07-13 NOTE — ASSESSMENT & PLAN NOTE
-initial rhythm as per ed notes PEA  - two rounds of cpr with rosc   - echo with right heart strain and elevated PA and increase central vein pressure pe was rule out cta negative   - respiratory arrest in setting of pneumonia  7/12  No improvement in mental status   Will continue with vent support   7/13  No improvement in mental status neurology consulted

## 2018-07-13 NOTE — SUBJECTIVE & OBJECTIVE
Review of Systems   Unable to perform ROS: Intubated     Objective:     Vital Signs (Most Recent):  Temp: 98.4 °F (36.9 °C) (07/13/18 1500)  Pulse: 95 (07/13/18 1504)  Resp: 20 (07/13/18 1504)  BP: (!) 108/58 (07/13/18 1504)  SpO2: 100 % (07/13/18 1504) Vital Signs (24h Range):  Temp:  [98 °F (36.7 °C)-99.7 °F (37.6 °C)] 98.4 °F (36.9 °C)  Pulse:  [] 95  Resp:  [14-29] 20  SpO2:  [74 %-100 %] 100 %  BP: ()/(35-72) 108/58     Weight: 54.2 kg (119 lb 7.8 oz)  Body mass index is 24.13 kg/m².    Intake/Output Summary (Last 24 hours) at 07/13/18 1622  Last data filed at 07/13/18 1500   Gross per 24 hour   Intake             1495 ml   Output             6300 ml   Net            -4805 ml      Physical Exam   Constitutional:   Ill appearing, frail   HENT:   ETT +    Eyes:   Sluggish pupillary reaction to light   Neck: Neck supple.   Cardiovascular:   Tachy at 87    Pulmonary/Chest: Effort normal. No respiratory distress.   + ronchi bilaterally    Abdominal: Soft. There is no tenderness.   Musculoskeletal: She exhibits no edema.   Neurological:   Unresponsive , not moving extremities    Skin: Skin is warm.   Nursing note and vitals reviewed.      Significant Labs: All pertinent labs within the past 24 hours have been reviewed.    Significant Imaging: I have reviewed all pertinent imaging results/findings within the past 24 hours.

## 2018-07-13 NOTE — SUBJECTIVE & OBJECTIVE
Interval History: no acute events o/n    Review of Systems   Unable to perform ROS: intubated     Objective:     Vital Signs (Most Recent):  Temp: 98.4 °F (36.9 °C) (07/13/18 1030)  Pulse: 94 (07/13/18 1240)  Resp: 20 (07/13/18 1240)  BP: (!) 111/51 (07/13/18 1125)  SpO2: 100 % (07/13/18 1240) Vital Signs (24h Range):  Temp:  [97.9 °F (36.6 °C)-98.8 °F (37.1 °C)] 98.4 °F (36.9 °C)  Pulse:  [] 94  Resp:  [19-30] 20  SpO2:  [74 %-100 %] 100 %  BP: ()/(35-66) 111/51     Weight: 54.2 kg (119 lb 7.8 oz)  Body mass index is 24.13 kg/m².     SpO2: 100 %  O2 Device (Oxygen Therapy): ventilator      Intake/Output Summary (Last 24 hours) at 07/13/18 1426  Last data filed at 07/13/18 0705   Gross per 24 hour   Intake             1125 ml   Output             1800 ml   Net             -675 ml       Lines/Drains/Airways     Drain                 Hemodialysis AV Fistula Left upper arm -- days         NG/OG Tube 07/11/18 1811 orogastric 18 Fr. Left mouth 1 day          Airway                 Airway - Non-Surgical 07/11/18 1426 Endotracheal Tube 2 days          Pressure Ulcer                 Pressure Injury 07/12/18 Left Heel Deep tissue injury 1 day         Pressure Injury 07/12/18 Right Heel Deep tissue injury 1 day          Peripheral Intravenous Line                 Peripheral IV - Single Lumen 07/11/18 1240 Right Forearm 2 days         Peripheral IV - Single Lumen 07/11/18 1438 Right  1 day                Physical Exam   Constitutional: She is oriented to person, place, and time. She appears well-developed and well-nourished. No distress.   HENT:   Head: Normocephalic and atraumatic.   Nose: Nose normal.   Mouth/Throat: Oropharynx is clear and moist.   Eyes: Conjunctivae and EOM are normal. No scleral icterus.   Neck: Normal range of motion. Neck supple. No JVD present. No thyromegaly present.   Cardiovascular: Normal rate, regular rhythm, S1 normal and S2 normal.  Exam reveals no gallop, no S3, no S4 and no  friction rub.    Murmur heard.  Pulmonary/Chest: Effort normal and breath sounds normal. No stridor. No respiratory distress. She has no wheezes. She has no rales. She exhibits no tenderness.   Abdominal: Soft. Bowel sounds are normal. She exhibits no distension and no mass. There is no tenderness. There is no rebound.   Genitourinary:   Genitourinary Comments: Deferred   Musculoskeletal: Normal range of motion. She exhibits no edema, tenderness or deformity.   Lymphadenopathy:     She has no cervical adenopathy.   Neurological: She is alert and oriented to person, place, and time. She exhibits normal muscle tone. Coordination normal.   Skin: Skin is warm and dry. No rash noted. She is not diaphoretic. No erythema. No pallor.   Psychiatric: She has a normal mood and affect. Her behavior is normal. Judgment and thought content normal.   Nursing note and vitals reviewed.      Significant Labs:   All pertinent lab results from the last 24 hours have been reviewed. and   Recent Lab Results       07/13/18  1205 07/13/18  0545 07/13/18  0522 07/13/18  0303 07/13/18  0036      Albumin    2.3(L)      Alkaline Phosphatase    93      Allens Test  Pass        ALT    26      Anion Gap    18(H)      Aniso    Slight      AST    27      BANDS    30.0      Baso #    CANCELED  Comment:  Result canceled by the ancillary      Basophil%    0.0      Total Bilirubin    1.2  Comment:  For infants and newborns, interpretation of results should be based  on gestational age, weight and in agreement with clinical  observations.  Premature Infant recommended reference ranges:  Up to 24 hours.............<8.0 mg/dL  Up to 48 hours............<12.0 mg/dL  3-5 days..................<15.0 mg/dL  6-29 days.................<15.0 mg/dL  (H)      Site  RR        BUN, Bld    25(H)      Vera Cells    Occasional      Calcium    9.6      Chloride    99      CO2    23      Creatinine    3.1(H)      DelSys  Adult Vent        Differential Method    Manual       eGFR if     17(A)      eGFR if non     15  Comment:  Calculation used to obtain the estimated glomerular filtration  rate (eGFR) is the CKD-EPI equation.   (A)      Eos #    CANCELED  Comment:  Result canceled by the ancillary      Eosinophil%    0.0      FiO2  45        Large/Giant Platelets    Present      Glucose    150(H)      Gran%    27.0(L)      Hematocrit    34.1(L)      Hemoglobin    10.3(L)      Hepatitis B Surface Ag          Hypo    Occasional      Lymph #    CANCELED  Comment:  Result canceled by the ancillary      Lymph%    10.0(L)      Magnesium    1.8      MCH    24.2(L)      MCHC    30.2(L)      MCV    80(L)      Metamyelocytes    15.0      Mode  AC/PRVC        Mono #    CANCELED  Comment:  Result canceled by the ancillary      Mono%    4.0      MPV    SEE COMMENT  Comment:  Result not available.      Myelocytes    14.0      Ovalocytes    Occasional      Pathologist Review Peripheral Smear    REVIEWED  Comment:  Electronically reviewed and signed by:  Melvin Smith MD  Signed on 07/13/18 at 07:32  Pathologist's interpretation of peripheral smear:  White cells: Mild leukocytosis with left shift.  Red cells:   Microcytic anemia.  Platelets: Thrombocytopenia.        PEEP  8        Phosphorus    3.3      Platelet Estimate    Appears normal      Platelets    121(L)      POC BE  5        POC HCO3  28.8(H)        POC PCO2  40.1        POC PH  7.464(H)        POC PO2  158(H)        POC SATURATED O2  100        POCT Glucose 130(H)  147(H)  162(H)     Poik    Slight      Poly    Occasional      Potassium    4.1      Total Protein    6.2      Rate  20        RBC    4.26      RDW    19.8(H)      Sample  ARTERIAL        Schistocytes    Present      Sodium    140      Spherocytes    Occasional      Stomatocytes    Present      Target Cells    Occasional      Tear Drop Cells    Occasional      Vancomycin, Random    26.0      Vt  350        WBC    13.43(H)                   07/12/18  1843 07/12/18  1534      Albumin       Alkaline Phosphatase       Allens Test       ALT       Anion Gap       Aniso       AST       BANDS       Baso #       Basophil%       Total Bilirubin       Site       BUN, Bld       Vera Cells       Calcium       Chloride       CO2       Creatinine       DelSys       Differential Method       eGFR if        eGFR if non        Eos #       Eosinophil%       FiO2       Large/Giant Platelets       Glucose       Gran%       Hematocrit       Hemoglobin       Hepatitis B Surface Ag  Negative     Hypo       Lymph #       Lymph%       Magnesium       MCH       MCHC       MCV       Metamyelocytes       Mode       Mono #       Mono%       MPV       Myelocytes       Ovalocytes       Pathologist Review Peripheral Smear       PEEP       Phosphorus       Platelet Estimate       Platelets       POC BE       POC HCO3       POC PCO2       POC PH       POC PO2       POC SATURATED O2       POCT Glucose 109      Poik       Poly       Potassium       Total Protein       Rate       RBC       RDW       Sample       Schistocytes       Sodium       Spherocytes       Stomatocytes       Target Cells       Tear Drop Cells       Vancomycin, Random       Vt       WBC             Significant Imaging: Echocardiogram:   2D echo with color flow doppler:   Results for orders placed or performed during the hospital encounter of 07/11/18   2D echo with color flow doppler   Result Value Ref Range    EF 60 55 - 65    Aortic Valve Stenosis MODERATE (A)     Est. PA Systolic Pressure 59.09 (A)     Mitral Valve Mobility MILDLY RESTRICTED     Tricuspid Valve Regurgitation MILD TO MODERATE     and X-Ray: CXR: X-Ray Chest 1 View (CXR):   Results for orders placed or performed during the hospital encounter of 07/11/18   X-Ray Chest 1 View    Narrative    EXAMINATION:  XR CHEST 1 VIEW    CLINICAL HISTORY:  intubation;    COMPARISON:  07/11/2018    FINDINGS:  There has been interval  placement of an NG tube.  The tip is not included on the film.  An endotracheal tube remains in place.  There is mild cardiomegaly.  There is prominence of the right hilar region.  There is a moderate amount of alveolar consolidation scattered throughout the left lung.  There is no pneumothorax.  The costophrenic angles are sharp.      Impression    1. There has been interval placement of an NG tube. The tip is not included on the film.  2. There is a moderate amount of alveolar consolidation scattered throughout the left lung. There is prominence of the right hilar region.  This is characteristic of pneumonia and adenopathy.  I recommend radiographic follow-up until complete resolution of the lungs.  3. There is mild cardiomegaly.  4. An endotracheal tube remains in place.  .      Electronically signed by: Colton Gaviria MD  Date:    07/12/2018  Time:    08:20

## 2018-07-13 NOTE — PROGRESS NOTES
Pt completed 3 hours of HD removing 2L. Allbumin given during tx to support BP. BP remained stable throughout tx (see flowsheet). Pt tolerated HD well. No signs of distress noted. De accessed per p&p  Each site held for 10mins, no post bleeding noted. Report called and given to [place] [facility] [facility] sotomayor made rounds during HD

## 2018-07-13 NOTE — CONSULTS
INPATIENT   NEUROLOGY CONSULT NOTE    Talia Mcfarland   68 y.o. female  Consult Requested By: Zayda England MD  Reason for Consult: Unresponsive     DATE 7/13/2018            Chief Complaint: Unresponsive  History of Present Illness:  History provided by the family members and medical staff  Patient has multiple medical problems. She was at the dialysis center and post dialysis she came for change in mental status and upon arrival she had a cardiac arrest and had PEA. She had sepsis and other metabolic issues. She is intubated and has not been sedated since yesterday. Her sedation was discontinued yesterday at 4:20 AM. Patient seen at the bedside, she has no gaze paresis  Does not participate in the exam. She does not have any spontaneous movements.                           [place] Coma Scale (GCS)    Score   EYE OPENING   Spontaneous 4   Response to verbal command 3   Response to pain 2   No eye opening 1             VERBAL response   Oriented 5   Confused 4   Inappropriate words 3   Incomprehensible sounds 2   No verbal response 1       MOTOR response   Obeys commands 6   Localizing response to pain 5   Withdrawal response to pain 4   Flexion to pain 3   Extension to pain 2   No motor response 1   Total  4   The GCS is scored between 3 and 15, 3 being the worst and 15 the best. It is composed of three parameters: best eye response (E), best verbal response (V), and best motor response (M). The components of the GCS should be recorded individually; for example, [id number] results in a GCS score of 9. A score of 13 or higher correlates with mild brain injury, a score of 9 to 12 correlates with moderate injury, and a score of 8 or less represents severe brain injury.  Past Medical History:   Diagnosis Date    Anemia     CHF (congestive heart failure)     Diabetes mellitus     DVT (deep venous thrombosis)     Left leg    ESRD on hemodialysis 2 years 2012    Gastroparesis     Hypertension     Type II or  unspecified type diabetes mellitus with renal manifestations, not stated as uncontrolled(250.40) 30 years     Urinary tract infection due to extended-spectrum beta lactamase (ESBL)-producing Klebsiella      Past Surgical History:   Procedure Laterality Date     SECTION      CHOLECYSTECTOMY      KUSHAL Bilateral     LBP severe with multiple KUSHAL     ROTATOR CUFF REPAIR      UMBILICAL HERNIA REPAIR      VEIN BYPASS SURGERY Left      Family History   Problem Relation Age of Onset    Kidney disease Neg Hx      Social History   Substance Use Topics    Smoking status: Never Smoker    Smokeless tobacco: Never Used    Alcohol use No       Review of patient's allergies indicates:   Allergen Reactions    Tylenol [acetaminophen] Other (See Comments)     Headache        Scheduled Meds:   acetylcysteine 100 mg/ml (10%)  4 mL Nebulization BID    albuterol-ipratropium  3 mL Nebulization Q6H    chlorhexidine  15 mL Mouth/Throat BID    famotidine (PF)  20 mg Intravenous Daily    heparin (porcine)  5,000 Units Subcutaneous Q8H    piperacillin-tazobactam (ZOSYN) IVPB  4.5 g Intravenous Q12H    polyethylene glycol  17 g Oral Daily    [START ON 2018] vancomycin (VANCOCIN) IVPB  750 mg Intravenous Q48H     Continuous Infusions:  PRN Meds:albumin human 25%, bisacodyl, dextrose 50%, glucagon (human recombinant), insulin aspart U-100      Review of Systems:unable to obtain         OBJECTIVE:     Vital Signs (Most Recent)  Temp: 98.5 °F (36.9 °C) (18 0305)  Pulse: 91 (18 0739)  Resp: (!) 21 (18 0739)  BP: (!) 130/35 (18 0600)  SpO2: 100 % (18 0739)     Vital Signs Range (Last 24H):  Temp:  [97.9 °F (36.6 °C)-98.8 °F (37.1 °C)]   Pulse:  [73-98]   Resp:  [19-30]   BP: ()/(35-66)   SpO2:  [96 %-100 %]     Physical Exam:  General:  She is intubated and unresponsive, no sedation  HEENT:   Acephalic, Atraumatic,   Pupils isocoria, reactive, corneal reactive  No gaze palsy  Neck: no  JVD, no Carotid bruit,  Lungs: CTA,  No rhonchi, no rales  Heart: Regular Rate and rhythm, no murmurs, rubs and or gallops  Abdomen, Soft, non tender, non distended, no abdominal  bruit, bowel sounds present  Extremities: No edema,   Musculoskeletal:  Left foot diabetic ulcer, cover with guaze  Skin: No rash, no ecchymoses,         NEURO    Mental Status:   Unresponsive to verbal commands      Cranial Nerves:  Pupils isocoria, reactive  Corneal reactive  No gaze paresis  No facial asymmetry  Does not withdraw to painful stimuli  Does not wrinkles forehead to painful stimuli  GAG absent  Cough ; Present      MOTOR:Upper Extremities and Lower extremities  No  Spontaneous movements  Trace movement in the left leg with painful stimulation, it is inconsistent    TONE:  Decreased throughout  REFLEXES: Deep tendon reflexes tested:  B/L  Upper extremities:               biceps (C5, C6):0               brachioradialis (C5, C6):0               triceps (C6, C7),0     Lower extremities:                knee or patellar (L2, 3, 4): 0               ankle (S1, S2):0        Plantar responses:  Extensor on the right  Clonus: negative   SENSORY: withdraws to pain in the right leg only          CEREBELLAR, ROMBERG and  GAIT:  deferred    EXTRAPYRAMIDALS:  Intermittent eye twitching      Laboratory:  Lab Results   Component Value Date    WBC 13.43 (H) 07/13/2018    HGB 10.3 (L) 07/13/2018    HCT 34.1 (L) 07/13/2018     (L) 07/13/2018    CHOL 176 12/23/2016    TRIG 85 12/23/2016    HDL 50 12/23/2016    ALT 26 07/13/2018    AST 27 07/13/2018     07/13/2018    K 4.1 07/13/2018    CL 99 07/13/2018    CREATININE 3.1 (H) 07/13/2018    BUN 25 (H) 07/13/2018    CO2 23 07/13/2018    TSH 1.353 07/11/2018    INR 1.1 12/22/2016    HGBA1C 5.6 07/11/2018          Diagnostic Results:CT scan films   ( All images reviewed Independently)   Imaging Results          CTA Chest Non Coronary (Final result)  Result time 07/11/18 17:46:11    Final  result by Adolfo Douglas Jr., MD (07/11/18 17:46:11)                 Impression:      No pulmonary embolus.    Multilobar pneumonia.    Enlargement of the pulmonary arteries consistent with pulmonary artery hypertension.    Marked cardiomegaly.    All CT scan at this facility use dose modulation, iterative reconstruction, and/or weight base dosing when appropriate to reduce radiation dose to as low as reasonably achievable.      Electronically signed by: Adolfo Douglas Jr., MD  Date:    07/11/2018  Time:    17:46             Narrative:    EXAMINATION:  CTA CHEST NON CORONARY    CLINICAL HISTORY:  Chest pain, acute, PE suspected, high pretest prob;    TECHNIQUE:  Axial CTA images performed through the chest after the administration of 100 cc intravenous contrast. Maximum intensity projections were performed and interpreted.    FINDINGS:  There is no evidence of pulmonary embolus. Pulmonary arteries are enlarged with the main pulmonary artery measuring 4.5 cm.  The heart is markedly enlarged with dense atherosclerotic calcifications of the coronary arteries.  The great vessels are patent.  Endotracheal tube in satisfactory position.  No mediastinal adenopathy.  Probable hemodialysis graft partially visualized the left upper extremity.    Extensive areas of consolidation involving the left upper and lower lobes.  There is also consolidation in the medial right lower lobe and patchy areas of ground-glass opacification within the right middle lobe.  Visualized abdominal organs are unremarkable.  Osseous structures are intact.  No effusion or pneumothorax.                               CT Head Without Contrast (Final result)  Result time 07/11/18 17:19:21    Final result by Adolfo Douglas Jr., MD (07/11/18 17:19:21)                 Impression:      1. No acute intracranial findings.  Stable exam.  All CT scans at this facility are performed  using dose modulation techniques as appropriate to performed exam including  the following:  automated exposure control; adjustment of mA and/or kV according to the patients size (this includes techniques or standardized protocols for targeted exams where dose is matched to indication/reason for exam: i.e. extremities or head);  iterative reconstruction technique.      Electronically signed by: Adolfo Douglas Jr., MD  Date:    07/11/2018  Time:    17:19             Narrative:    EXAMINATION:  CT HEAD WITHOUT CONTRAST    CLINICAL HISTORY:  Decreased alertness;    TECHNIQUE:  CT scan was obtained of the head without administration of contrast.    COMPARISON:  07/11/2018.    FINDINGS:  Stable atrophy with periventricular white matter changes.  No extra-axial acute fluid collection.Ventricles and basal cisterns are normal.  No hemorrhage, mass effect or midline shift.  No other cerebral or cerebellar parenchymal abnormality.  Paranasal sinuses are clear.  Mastoid air cells are clear.  Calvarium is intact.                               X-Ray Chest AP Portable (Final result)  Result time 07/11/18 15:17:22    Final result by Vamshi Lynne MD (07/11/18 15:17:22)                 Impression:      As above.      Electronically signed by: Vamshi Lynne MD  Date:    07/11/2018  Time:    15:17             Narrative:    EXAMINATION:  XR CHEST AP PORTABLE    CLINICAL HISTORY:  post intubation; respiratory distress    TECHNIQUE:  AP view of the chest was performed.    COMPARISON:  Earlier today.    FINDINGS:  Endotracheal tube in satisfactory position.  Worsening left upper lobe pneumonia.                               X-Ray Foot Complete Left (Final result)  Result time 07/11/18 13:18:19    Final result by Vamshi Lynne MD (07/11/18 13:18:19)                 Impression:      Charcot arthropathy.  Superimposed osteomyelitis is not excluded.  Consider MRI correlation.      Electronically signed by: Vamshi Lynne MD  Date:    07/11/2018  Time:    13:18             Narrative:    EXAMINATION:  XR FOOT COMPLETE 3 VIEW  LEFT    CLINICAL HISTORY:  Osteomyelitis, unspecified.   Pain.    TECHNIQUE:  3 views.    COMPARISON:  None    FINDINGS:  Severe osteopenia.  Extensive vascular calcifications.  Plantar subluxation of the midfoot.  Sclerosis and partial collapse of the middle cuneiform.  Extensive erosive changes throughout the midfoot.  Diffuse soft tissue swelling.  Prominent plantar spur.                               X-Ray Chest AP Portable (Final result)  Result time 07/11/18 13:15:44    Final result by Vamshi Lynne MD (07/11/18 13:15:44)                 Impression:      Left upper lobe pneumonia.  Recommend close follow-up after treatment.      Electronically signed by: Vamshi Lynne MD  Date:    07/11/2018  Time:    13:15             Narrative:    EXAMINATION:  XR CHEST AP PORTABLE    CLINICAL HISTORY:  cough;    TECHNIQUE:  AP view of the chest was performed.    COMPARISON:  October 2, 2017    FINDINGS:  Left upper lobe consolidation.    Stable cardiomegaly.  Aortic atherosclerosis.  No acute osseous findings demonstrated.                               US Lower Extremity Veins Bilateral (Final result)  Result time 07/11/18 13:15:04    Final result by Vamshi Lynne MD (07/11/18 13:15:04)                 Impression:      Negative for bilateral lower extremity DVT.  Limited visualization of the left calf veins.      Electronically signed by: Vamshi Lynne MD  Date:    07/11/2018  Time:    13:15             Narrative:    EXAMINATION:  US LOWER EXTREMITY VEINS BILATERAL    CLINICAL HISTORY:  Edema, unspecified    FINDINGS:  Grayscale and color Doppler sonography was formed through the bilateral lower extremities.    The visualized bilateral lower extremity veins are widely patent with normal augmentation, and compressibility and respiratory variability.  The left leg and calf veins were not visualized secondary to use marked soft tissue edema and skin thickening.  No evidence of fluid collection.                               CT Head  Without Contrast (Final result)  Result time 18 12:18:25    Final result by Benedict Henderson MD (18 12:18:25)                 Impression:      Chronic microvascular ischemic changes.      Electronically signed by: Benedict Henderson MD  Date:    2018  Time:    12:18             Narrative:    EXAMINATION:  CT HEAD WITHOUT CONTRAST    CLINICAL HISTORY:  ams;    TECHNIQUE:  Low dose axial CT images obtained throughout the head without intravenous contrast. Sagittal and coronal reconstructions were performed.  All CT scans at this facility use dose modulation, iterative reconstruction and/or weight based dosing when appropriate to reduce radiation dose to as low as reasonably achievable.    COMPARISON:  2016    FINDINGS:  Intracranial compartment:    The brain parenchyma demonstrates areas of decreased attenuation with mild to moderate periventricular white matter consistent with chronic microvascular ischemic changes..  No parenchymal mass, hemorrhage, edema or major vascular distribution infarct.  Vascular calcifications are noted.    Mild prominence of the sulci and ventricles are consistent with age-related involutional changes.    No extra-axial blood or fluid collections.    Skull/extracranial contents (limited evaluation): No fracture. Mastoid air cells and paranasal sinuses are essentially clear.                            Reviewed both the head CT's done independently    2018    Assessment and Recommendations:  Patient with multiple medical issues. S/P Cardiac arrest  EEG is abnormal, indicative of Encephalopathy, intermittent spikes with suppression        Differential diagnosis:  1) Metabolic Encephalopathy  2) Hypoxic Encephalopathy  3) Toxic Encephalopathy      Recommendations:  1) Medical management  2) Keppra 1000 mg stat and than 500 mg iv bid, additional 500 mg post dialysis        Family Meetin) Discussed with the patient's daughter, sister and son in law  2) discussed  about the prognosis    60 minutes of critical care provided         Thank you for the consult  Nirav Veronica., MD., Ph.D., MS

## 2018-07-13 NOTE — SUBJECTIVE & OBJECTIVE
Interval History:   Review of Systems   Unable to perform ROS: Intubated       Objective:     Vital Signs (Most Recent):  Temp: 98.4 °F (36.9 °C) (07/13/18 1030)  Pulse: 94 (07/13/18 1240)  Resp: 20 (07/13/18 1240)  BP: (!) 111/51 (07/13/18 1125)  SpO2: 100 % (07/13/18 1240) Vital Signs (24h Range):  Temp:  [97.9 °F (36.6 °C)-98.8 °F (37.1 °C)] 98.4 °F (36.9 °C)  Pulse:  [] 94  Resp:  [19-30] 20  SpO2:  [74 %-100 %] 100 %  BP: ()/(35-66) 111/51     Weight: 54.2 kg (119 lb 7.8 oz)  Body mass index is 24.13 kg/m².      Intake/Output Summary (Last 24 hours) at 07/13/18 1403  Last data filed at 07/13/18 0705   Gross per 24 hour   Intake             1125 ml   Output             1800 ml   Net             -675 ml       Physical Exam   Constitutional:   Ill appearing, frail   HENT:   ETT +    Eyes:   Sluggish pupillary reaction to light   Neck: Neck supple.   Cardiovascular:   Tachy at 87    Pulmonary/Chest: Effort normal. No respiratory distress.   + ronchi bilaterally    Abdominal: Soft. There is no tenderness.   Musculoskeletal: She exhibits no edema.   Neurological:   Unresponsive , not moving extremities    Skin: Skin is warm.       Vents:  Vent Mode: A/C (07/13/18 1240)  Ventilator Initiated: Yes (07/11/18 1454)  Set Rate: 16 bmp (07/13/18 1240)  Vt Set: 350 mL (07/13/18 1240)  Pressure Support: 0 cmH20 (07/13/18 1240)  PEEP/CPAP: 8 cmH20 (07/13/18 1240)  Oxygen Concentration (%): 35 (07/13/18 1240)  Peak Airway Pressure: 23 cmH2O (07/13/18 1240)  Plateau Pressure: 0 cmH20 (07/13/18 1240)  Total Ve: 6.83 mL (07/13/18 1240)  F/VT Ratio<105 (RSBI): (!) 62.11 (07/13/18 1240)    Lines/Drains/Airways     Drain                 Hemodialysis AV Fistula Left upper arm -- days         NG/OG Tube 07/11/18 1811 orogastric 18 Fr. Left mouth 1 day          Airway                 Airway - Non-Surgical 07/11/18 1426 Endotracheal Tube 1 day          Pressure Ulcer                 Pressure Injury 07/12/18 Left Heel Deep  tissue injury 1 day         Pressure Injury 07/12/18 Right Heel Deep tissue injury 1 day          Peripheral Intravenous Line                 Peripheral IV - Single Lumen 07/11/18 1240 Right Forearm 2 days         Peripheral IV - Single Lumen 07/11/18 1438 Right  1 day                Significant Labs:    CBC/Anemia Profile:    Recent Labs  Lab 07/11/18  1536 07/12/18  0350 07/13/18  0303   WBC 11.76 7.14 13.43*   HGB 10.9* 10.9* 10.3*   HCT 37.0 36.5* 34.1*    149* 121*   MCV 82 82 80*   RDW 19.2* 19.6* 19.8*        Chemistries:    Recent Labs  Lab 07/11/18  1536 07/12/18  0350 07/13/18  0303    138 140   K 4.2 4.4 4.1   CL 97 97 99   CO2 25 20* 23   BUN 24* 29* 25*   CREATININE 3.8* 4.4* 3.1*   CALCIUM 10.5 9.5 9.6   ALBUMIN 2.6* 2.4* 2.3*   PROT 6.7 6.5 6.2   BILITOT 0.8 0.9 1.2*   ALKPHOS 105 82 93   ALT 43 32 26   AST 75* 47* 27   MG 2.0 2.0 1.8   PHOS  --  5.0* 3.3     URINE CULTURE POSITIVE FOR E COLI MORE THAN 100,000  BLOOD CULTURE POSITIVE FOR ACINETOBACTER SPECIES.  Significant Imaging:  CXR: I have reviewed all pertinent results/findings within the past 24 hours and my personal findings are:  ET TUBE IN GOOD POSITION . NO CHANGES.

## 2018-07-13 NOTE — SUBJECTIVE & OBJECTIVE
Past Medical History:   Diagnosis Date    Anemia     CHF (congestive heart failure)     Diabetes mellitus     DVT (deep venous thrombosis)     Left leg    ESRD on hemodialysis 2 years     Gastroparesis     Hypertension     Type II or unspecified type diabetes mellitus with renal manifestations, not stated as uncontrolled(250.40) 30 years     Urinary tract infection due to extended-spectrum beta lactamase (ESBL)-producing Klebsiella        Past Surgical History:   Procedure Laterality Date     SECTION      CHOLECYSTECTOMY      KUSHAL Bilateral     LBP severe with multiple KUSHAL     ROTATOR CUFF REPAIR      UMBILICAL HERNIA REPAIR      VEIN BYPASS SURGERY Left        Review of patient's allergies indicates:   Allergen Reactions    Tylenol [acetaminophen] Other (See Comments)     Headache       Medications:  Facility-Administered Medications Prior to Admission   Medication    denosumab (PROLIA) injection 60 mg     Prescriptions Prior to Admission   Medication Sig    amLODIPine (NORVASC) 5 MG tablet TAKE 1 TABLET BY MOUTH DAILY    aspirin (ECOTRIN) 81 MG EC tablet Take 81 mg by mouth once daily.    carvedilol (COREG) 25 MG tablet Take 25 mg by mouth 2 (two) times daily with meals.    hydrocodone-acetaminophen 5-325mg (NORCO) 5-325 mg per tablet Take 1 tablet by mouth 2 (two) times daily as needed for Pain.    levothyroxine (SYNTHROID) 25 MCG tablet Take 25 mcg by mouth once daily.    lidocaine-prilocaine (EMLA) cream Apply topically as needed. APPLY TO TREATMENT AREA 1 HOUR PRIOR TO TREATMENT UTD    pantoprazole (PROTONIX) 20 MG tablet Take 1 tablet (20 mg total) by mouth once daily.    promethazine (PHENERGAN) 25 MG tablet Take 1 tablet (25 mg total) by mouth every 6 (six) hours as needed for Nausea.    ropinirole (REQUIP) 0.5 MG tablet Take 1 tablet (0.5 mg total) by mouth 3 (three) times daily.     Antibiotics     Start     Stop Route Frequency Ordered    18 1600  vancomycin 750  mg in dextrose 5 % 250 mL IVPB (ready to mix system)      -- IV Every 48 hours (non-standard times) 07/11/18 1927    07/12/18 0200  piperacillin-tazobactam 4.5 g in dextrose 5 % 100 mL IVPB (ready to mix system)      -- IV Every 12 hours (non-standard times) 07/11/18 1740        Antifungals     None        Antivirals     None             There is no immunization history on file for this patient.    Family History     None        Social History     Social History    Marital status:      Spouse name: N/A    Number of children: N/A    Years of education: N/A     Social History Main Topics    Smoking status: Never Smoker    Smokeless tobacco: Never Used    Alcohol use No    Drug use: No    Sexual activity: Not Asked     Other Topics Concern    None     Social History Narrative    None     Review of Systems   Unable to perform ROS: Intubated     Objective:     Vital Signs (Most Recent):  Temp: 97.9 °F (36.6 °C) (07/12/18 1500)  Pulse: 88 (07/12/18 2129)  Resp: 20 (07/12/18 2129)  BP: (!) 123/52 (07/12/18 1806)  SpO2: 100 % (07/12/18 2129) Vital Signs (24h Range):  Temp:  [97.9 °F (36.6 °C)-99.7 °F (37.6 °C)] 97.9 °F (36.6 °C)  Pulse:  [30-98] 88  Resp:  [17-30] 20  SpO2:  [96 %-100 %] 100 %  BP: ()/(37-75) 123/52     Weight: 54.2 kg (119 lb 7.8 oz)  Body mass index is 24.13 kg/m².    Estimated Creatinine Clearance: 10.5 mL/min (A) (based on SCr of 4.4 mg/dL (H)).    Physical Exam   Constitutional: She appears well-developed.   HENT:   Head: Normocephalic and atraumatic.   Mouth/Throat: Oropharynx is clear and moist. No oropharyngeal exudate.   ETT in place   Eyes: Conjunctivae are normal.   Neck: No JVD present. Carotid bruit is not present. No tracheal deviation present. No thyroid mass and no thyromegaly present.   Cardiovascular: Normal rate, regular rhythm and intact distal pulses.  Exam reveals no gallop and no friction rub.    Murmur heard.  Pulmonary/Chest: No respiratory distress. She has  wheezes. She has rales. She exhibits no tenderness.   Abdominal: Soft. Bowel sounds are normal. She exhibits no distension, no abdominal bruit, no ascites and no mass. There is no hepatosplenomegaly. There is no tenderness. There is no rebound, no guarding and no CVA tenderness.   Musculoskeletal: She exhibits edema. She exhibits no tenderness.   Lymphadenopathy:     She has no cervical adenopathy.   Neurological: No cranial nerve deficit. She exhibits normal muscle tone. Coordination normal.   Skin: Skin is warm and intact. No rash noted. No erythema. No pallor.   Nursing note and vitals reviewed.      Significant Labs:   Blood Culture:   Recent Labs  Lab 07/11/18  1220 07/11/18  1240   LABBLOO Gram stain aer bottle: Gram negative rods   Results called to and read back by: Robert Staples RN 07/12/2018  06:20 Gram stain aer bottle: Gram negative rods   Results called to and read back by: Robert Staples RN 07/12/2018  06:20     BMP:   Recent Labs  Lab 07/12/18  0350   *      K 4.4   CL 97   CO2 20*   BUN 29*   CREATININE 4.4*   CALCIUM 9.5   MG 2.0     CBC:   Recent Labs  Lab 07/11/18  1240 07/11/18  1536 07/12/18  0350   WBC 15.10* 11.76 7.14   HGB 12.0 10.9* 10.9*   HCT 40.6 37.0 36.5*    165 149*     All pertinent labs within the past 24 hours have been reviewed.    Significant Imaging: I have reviewed all pertinent imaging results/findings within the past 24 hours.

## 2018-07-13 NOTE — PROCEDURES
"Talia Mcfarland is a 68 y.o. female patient.    Temp: 98.4 °F (36.9 °C) (07/13/18 1500)  Pulse: 95 (07/13/18 1504)  Resp: 20 (07/13/18 1504)  BP: (!) 108/58 (07/13/18 1504)  SpO2: 100 % (07/13/18 1504)  Weight: 54.2 kg (119 lb 7.8 oz) (07/11/18 2045)  Height: 4' 11" (149.9 cm) (07/11/18 2045)       Prepare patient for dialysis  Date/Time: 7/13/2018 5:15 PM  Performed by: JAMES RIOS.  Authorized by: JAMES RIOS.     Ultra-filtration only  Date/Time: 7/13/2018 5:15 PM  Performed by: JAMES RIOS.  Authorized by: JAMES RIOS.     Hemodialysis inpatient If "per protocol" is selected for one or more ingredients (K+, Ca++, Na+, Bicarb) for the dialysate bath solution, select the hyperlink for the protocol instructions.  Date/Time: 7/13/2018 5:15 PM  Performed by: JAMES RIOS.  Authorized by: JEANNINE LAZO         Patient seen and examined in the ICU.  Patient status post cardiac arrest.  Patient has had a very poor quality of life  due to gastroparesis and multiple missed dialysis.  Now has poor neurological status.    Recommend comfort measures only.    Hemodialysis for comfort measures today as a last resort.  Family meeting in the morning for possible withdrawal of care at this point.    Patient seen and examined and monitor during dialysis    James Rios  7/13/2018  "

## 2018-07-13 NOTE — PROGRESS NOTES
[facility]  Critical Care Medicine  Progress Note    Patient Name: Talia Mcfarland  MRN: [id number]  Admission Date: 7/11/2018  Hospital Length of Stay: 2 days  Code Status: Prior  Attending Provider: Zayda England MD  Primary Care Provider: Tank Castro MD   Principal Problem: HAP (hospital-acquired pneumonia)    Subjective:     HPI:  68-year-old female patient brought by EMS to the emergency department from dialysis after she complained of malaise and had altered mental status after dialysis today.    Hospital/ICU Course:  While in the ER the patient had pulseless electrical activity and she had CPR performed for about 6 min.  Patient was seen and examined in the emergency room.  She was intubated.  Chest x-ray post intubation showed low ET tube ET tube was pulled out.  Repeat chest x-ray showed good position of ET tube.  7/12 Seen and examined. Intubated, no sedation, no pressors. Tmax 100.8. Bld cx GNR. CXR and ABG reviewed.   7/13 patient seen and examined, intubated, not sedated no pressors.  Chest x-ray and ABG reviewed no changes in mental status.  Breathing over the vent.  Not moving extremities unresponsive.    Interval History:   Review of Systems   Unable to perform ROS: Intubated       Objective:     Vital Signs (Most Recent):  Temp: 98.4 °F (36.9 °C) (07/13/18 1030)  Pulse: 94 (07/13/18 1240)  Resp: 20 (07/13/18 1240)  BP: (!) 111/51 (07/13/18 1125)  SpO2: 100 % (07/13/18 1240) Vital Signs (24h Range):  Temp:  [97.9 °F (36.6 °C)-98.8 °F (37.1 °C)] 98.4 °F (36.9 °C)  Pulse:  [] 94  Resp:  [19-30] 20  SpO2:  [74 %-100 %] 100 %  BP: ()/(35-66) 111/51     Weight: 54.2 kg (119 lb 7.8 oz)  Body mass index is 24.13 kg/m².      Intake/Output Summary (Last 24 hours) at 07/13/18 1403  Last data filed at 07/13/18 0705   Gross per 24 hour   Intake             1125 ml   Output             1800 ml   Net             -675 ml       Physical Exam   Constitutional:   Ill appearing,  frail   HENT:   ETT +    Eyes:   Sluggish pupillary reaction to light   Neck: Neck supple.   Cardiovascular:   Tachy  Pulmonary/Chest: Effort normal. No respiratory distress.   + ronchi bilaterally    Abdominal: Soft. There is no tenderness.   Musculoskeletal: She exhibits no edema.   Neurological:   Unresponsive , not moving extremities    Skin: Skin is warm.       Vents:  Vent Mode: A/C (07/13/18 1240)  Ventilator Initiated: Yes (07/11/18 1454)  Set Rate: 16 bmp (07/13/18 1240)  Vt Set: 350 mL (07/13/18 1240)  Pressure Support: 0 cmH20 (07/13/18 1240)  PEEP/CPAP: 8 cmH20 (07/13/18 1240)  Oxygen Concentration (%): 35 (07/13/18 1240)  Peak Airway Pressure: 23 cmH2O (07/13/18 1240)  Plateau Pressure: 0 cmH20 (07/13/18 1240)  Total Ve: 6.83 mL (07/13/18 1240)  F/VT Ratio<105 (RSBI): (!) 62.11 (07/13/18 1240)    Lines/Drains/Airways     Drain                 Hemodialysis AV Fistula Left upper arm -- days         NG/OG Tube 07/11/18 1811 orogastric 18 Fr. Left mouth 1 day          Airway                 Airway - Non-Surgical 07/11/18 1426 Endotracheal Tube 1 day          Pressure Ulcer                 Pressure Injury 07/12/18 Left Heel Deep tissue injury 1 day         Pressure Injury 07/12/18 Right Heel Deep tissue injury 1 day          Peripheral Intravenous Line                 Peripheral IV - Single Lumen 07/11/18 1240 Right Forearm 2 days         Peripheral IV - Single Lumen 07/11/18 1438 Right  1 day                Significant Labs:    CBC/Anemia Profile:    Recent Labs  Lab 07/11/18  1536 07/12/18  0350 07/13/18  0303   WBC 11.76 7.14 13.43*   HGB 10.9* 10.9* 10.3*   HCT 37.0 36.5* 34.1*    149* 121*   MCV 82 82 80*   RDW 19.2* 19.6* 19.8*        Chemistries:    Recent Labs  Lab 07/11/18  1536 07/12/18  0350 07/13/18  0303    138 140   K 4.2 4.4 4.1   CL 97 97 99   CO2 25 20* 23   BUN 24* 29* 25*   CREATININE 3.8* 4.4* 3.1*   CALCIUM 10.5 9.5 9.6   ALBUMIN 2.6* 2.4* 2.3*   PROT 6.7 6.5 6.2   BILITOT  0.8 0.9 1.2*   ALKPHOS 105 82 93   ALT 43 32 26   AST 75* 47* 27   MG 2.0 2.0 1.8   PHOS  --  5.0* 3.3     URINE CULTURE POSITIVE FOR E COLI MORE THAN 100,000  BLOOD CULTURE POSITIVE FOR ACINETOBACTER SPECIES.  Significant Imaging:  CXR: I have reviewed all pertinent results/findings within the past 24 hours and my personal findings are:  ET TUBE IN GOOD POSITION . NO CHANGES.      Assessment/Plan:     Pulmonary   * HAP (hospital-acquired pneumonia)    Bld cx GNR , IV Zosyn , If resp cx negative for MRSA, dc Vanco  7/13 blood culture positive for Acinetobacter.  Will DC vancomycin.  Continue Unasyn.          Acute respiratory failure    O2/mechanical ventilation/pulmonary toilet  Daily ABG and chest x-ray  After post intubation ABG, tidal volume adjust to 350, FiO2 70%, keep respiratory rate at 22, peep 5 cm water.  7/12O2/mechanical ventilation/pulmonary toilet , Daily ABG and chest x-ray. Decrease RR to 16   7/13 decrease FiO2 to 35%.        Cardiac/Vascular   Cardiac arrest    ICU monitoring  Coiling protocol not applied, patient had PA, non shockable rhythm.  Cardiology consult  7/12 Levophed if needed keep MAP > 65 mm Hg   7/13 same        Renal/   ESRD on hemodialysis    Patient had hemodialysis today.  Nephrology consult.  7/12 no need for HD today  7/13 HD as per Renal.  From respiratory standpoint O2 requirement acceptable no concern for fluid overload.        ID   Severe sepsis    Broad-spectrum antibiotic with IV Zosyn and vancomycin    7/12 GNR septicemia. IV Zosyn.           Critical Care Daily Checklist:    A: Awake: RASS Goal/Actual Goal: RASS Goal: 0-->alert and calm  Actual: [place] Agitation Sedation Scale (RASS): Deep sedation   B: Spontaneous Breathing Trial Performed?   not applicable, patient unresponsive   C: SAT & SBT Coordinated?  Not applicable                      D: Delirium: CAM-ICU Overall CAM-ICU: Positive   E: Early Mobility Performed? Patient intubated   F: Feeding Goal: Goals:  Meet > 85 % EEN/EPN while admitted  Status: Nutrition Goal Status: new   Current Diet Order   No orders of the defined types were placed in this encounter.      AS: Analgesia/Sedation Unresponsive no sedation   T: Thromboembolic Prophylaxis Heparin subcutaneous   H: HOB > 300 Yes   U: Stress Ulcer Prophylaxis (if needed) Famotidine   G: Glucose Control Fingerstick q.6 hours with the short-acting insulin   B: Bowel Function     I: Indwelling Catheter (Lines & Berry) Necessity Keep Berry   D: De-escalation of Antimicrobials/Pharmacotherapies Discontinue vancomycin continue Unasyn    Plan for the day/ETD Y    Code Status:  Family/Goals of Care: Prior  Y. spoke with daughter and son-in-law at bedside, will arrange for a family meeting tomorrow at 12:00 p.m.     Critical Care Time: 35 minutes  Critical secondary to Patient has a condition that poses threat to life and bodily function:  RESPIRATORY FAILURE AND CARDIAC ARREST      Critical care was time spent personally by me on the following activities: development of treatment plan with patient or surrogate and bedside caregivers, discussions with consultants, evaluation of patient's response to treatment, examination of patient, ordering and performing treatments and interventions, ordering and review of laboratory studies, ordering and review of radiographic studies, pulse oximetry, re-evaluation of patient's condition. This critical care time did not overlap with that of any other provider or involve time for any procedures.     Zena Kline MD  Critical Care Medicine  [facility]

## 2018-07-13 NOTE — PROGRESS NOTES
[facility] Hospital Medicine  Progress Note    Patient Name: Talia Mcfarland  MRN: [id number]  Patient Class: IP- Inpatient   Admission Date: 7/11/2018  Length of Stay: 2 days  Attending Physician: Zayda England MD  Primary Care Provider: Tank Castro MD        Subjective:     Principal Problem:HAP (hospital-acquired pneumonia)    HPI:  Talia Mcfarland is a 66 y.o. female patient with PMHx of ESRD on HD, DM2, HTN, PVD, who presented to the ER from dialysis center with alert mental status . At dialysis center patient was receiving  First dose of vancomycin  For possible infection . She was confused so was transferred to ed . History obtained from electronic records no family bedside . 13 30 pm patient was seen by ed physician with confusion but awake . Later around 14 25 she was found not responsive bradycardiac and show breathing weak pulse was moved to ed room 4 from room 14 . Was intubated and after that was found to have no pulse(pea) cpr was started with two rounds , two epi,one atropine / calcium and bicarb  . After ROSc ekg done was with no acute changes .  . Not following commands but randomly moving extremities and opening eyes . Stat echo was done which showed right heart strain with possibility of PE . So cta was ordered . Also started on broad spectrum antibotic culture drawn     Hospital Course:  7/12  Patient seen and examined today .she is only fentanyl drip . no improvement in mental status after cardiac arrest . Gram negative bactermia . Continue to be ventilator   7/13  Patient has ACINETOBACTER SPECIES in blood and e coli in urine .infectious disease was informed recommended Unasyn and vancomycin as per pharmacy  . Neurology consulted for mental status after cardiac arrest         Review of Systems   Unable to perform ROS: Intubated     Objective:     Vital Signs (Most Recent):  Temp: 98.4 °F (36.9 °C) (07/13/18 1500)  Pulse: 95 (07/13/18 1504)  Resp: 20 (07/13/18  1504)  BP: (!) 108/58 (07/13/18 1504)  SpO2: 100 % (07/13/18 1504) Vital Signs (24h Range):  Temp:  [98 °F (36.7 °C)-99.7 °F (37.6 °C)] 98.4 °F (36.9 °C)  Pulse:  [] 95  Resp:  [14-29] 20  SpO2:  [74 %-100 %] 100 %  BP: ()/(35-72) 108/58     Weight: 54.2 kg (119 lb 7.8 oz)  Body mass index is 24.13 kg/m².    Intake/Output Summary (Last 24 hours) at 07/13/18 1622  Last data filed at 07/13/18 1500   Gross per 24 hour   Intake             1495 ml   Output             6300 ml   Net            -4805 ml      Physical Exam   Constitutional:   Ill appearing, frail   HENT:   ETT +    Eyes:   Sluggish pupillary reaction to light   Neck: Neck supple.   Cardiovascular:   Tachy at 87    Pulmonary/Chest: Effort normal. No respiratory distress.   + ronchi bilaterally    Abdominal: Soft. There is no tenderness.   Musculoskeletal: She exhibits no edema.   Neurological:   Unresponsive , not moving extremities    Skin: Skin is warm.   Nursing note and vitals reviewed.      Significant Labs: All pertinent labs within the past 24 hours have been reviewed.    Significant Imaging: I have reviewed all pertinent imaging results/findings within the past 24 hours.    Assessment/Plan:      * HAP (hospital-acquired pneumonia)    -dialysis patient with multilobar pneumonia  - continue with broad spectrum antibotics   -follow up on cultures   - continue with ventilator support   7/12  Continue zosyn and vanco   Gram negative rods bactermia   7/13  Continue with unasyn and vancomycin         Deep tissue injury              DM type 2 (diabetes mellitus, type 2)    Continue with iss   And follow hba1c           Severe sepsis    - multilobar pneumonia on broad spectrum antibotics   - also possible oste in toe will order mri once patient stable  - continue with antibotics and follow up on cultures   7/12  Continue with zosyn and vancomycin  Blood culture gram negative rods         Acute respiratory failure    -acute hyper capneic and  hypoxic respiratory failure   - in setting of multilobar pneumonia   - continue with broad spectrum antibotics and follow up on cultures   - pe rule out with cta          Cardiac arrest    -initial rhythm as per ed notes PEA  - two rounds of cpr with rosc   - echo with right heart strain and elevated PA and increase central vein pressure pe was rule out cta negative   - respiratory arrest in setting of pneumonia  7/12  No improvement in mental status   Will continue with vent support   7/13  No improvement in mental status neurology consulted        Elevated troponin    - stress induced in setting of the sepsis           ESRD on hemodialysis    -nephrology on board   - case discussed with  nephbertin   7/12  Nephrology on board currently getting dialysis         Type II or unspecified type diabetes mellitus with renal manifestations, not stated as uncontrolled(250.40)                VTE Risk Mitigation         Ordered     heparin (porcine) injection 5,000 Units  Every 8 hours      07/11/18 1811          Critical care time spent on the evaluation and treatment of severe organ dysfunction, review of pertinent labs and imaging studies, discussions with consulting providers and discussions with patient/family: 40 minutes.    Zayda England MD  Department of Hospital Medicine   [facility]

## 2018-07-13 NOTE — ASSESSMENT & PLAN NOTE
-dialysis patient with multilobar pneumonia  - continue with broad spectrum antibotics   -follow up on cultures   - continue with ventilator support   7/12  Continue zosyn and vanco   Gram negative rods bactermia   7/13  Continue with unasyn and vancomycin

## 2018-07-13 NOTE — HPI
66 year old  female with history of ESRD on HD, DM2, HTN, PVD, who presented to the ER from dialysis center with alert mental status .  Hospital course was complicated by PEA  Cardiac arrest .She is presently intubated and blood cultures are now positive for GNR.  CTA of the chest showed multilobar pneumonia.

## 2018-07-13 NOTE — ASSESSMENT & PLAN NOTE
ICU monitoring  Coiling protocol not applied, patient had PA, non shockable rhythm.  Cardiology consult  7/12 Levophed if needed keep MAP > 65 mm Hg   7/13 same

## 2018-07-13 NOTE — ASSESSMENT & PLAN NOTE
Bld cx GNR , IV Zosyn , If resp cx negative for MRSA, dc Vanco  7/13 blood culture positive for Acinetobacter.  Will DC vancomycin.  Continue Unasyn.

## 2018-07-13 NOTE — PROGRESS NOTES
[facility] -   Cardiology  Progress Note    Patient Name: Talia Mcfarland  MRN: [id number]  Admission Date: 7/11/2018  Hospital Length of Stay: 2 days  Code Status: Prior   Attending Physician: Zayda England MD   Primary Care Physician: Tank Castro MD  Expected Discharge Date:   Principal Problem:HAP (hospital-acquired pneumonia)    Subjective:     Hospital Course:   7/12/18 - pt remains intubated off pressors, resumed HD; no improvement in mental status  7/13/18 - No acute events o/n, no change in mental status; neurology to evaluate, poor prognosis    Interval History: no acute events o/n    Review of Systems   Unable to perform ROS: intubated     Objective:     Vital Signs (Most Recent):  Temp: 98.4 °F (36.9 °C) (07/13/18 1030)  Pulse: 94 (07/13/18 1240)  Resp: 20 (07/13/18 1240)  BP: (!) 111/51 (07/13/18 1125)  SpO2: 100 % (07/13/18 1240) Vital Signs (24h Range):  Temp:  [97.9 °F (36.6 °C)-98.8 °F (37.1 °C)] 98.4 °F (36.9 °C)  Pulse:  [] 94  Resp:  [19-30] 20  SpO2:  [74 %-100 %] 100 %  BP: ()/(35-66) 111/51     Weight: 54.2 kg (119 lb 7.8 oz)  Body mass index is 24.13 kg/m².     SpO2: 100 %  O2 Device (Oxygen Therapy): ventilator      Intake/Output Summary (Last 24 hours) at 07/13/18 1426  Last data filed at 07/13/18 0705   Gross per 24 hour   Intake             1125 ml   Output             1800 ml   Net             -675 ml       Lines/Drains/Airways     Drain                 Hemodialysis AV Fistula Left upper arm -- days         NG/OG Tube 07/11/18 1811 orogastric 18 Fr. Left mouth 1 day          Airway                 Airway - Non-Surgical 07/11/18 1426 Endotracheal Tube 2 days          Pressure Ulcer                 Pressure Injury 07/12/18 Left Heel Deep tissue injury 1 day         Pressure Injury 07/12/18 Right Heel Deep tissue injury 1 day          Peripheral Intravenous Line                 Peripheral IV - Single Lumen 07/11/18 1240 Right Forearm 2 days          Peripheral IV - Single Lumen 07/11/18 1438 Right  1 day                Physical Exam   Constitutional: She is oriented to person, place, and time. She appears well-developed and well-nourished. No distress.   HENT:   Head: Normocephalic and atraumatic.   Nose: Nose normal.   Mouth/Throat: Oropharynx is clear and moist.   Eyes: Conjunctivae and EOM are normal. No scleral icterus.   Neck: Normal range of motion. Neck supple. No JVD present. No thyromegaly present.   Cardiovascular: Normal rate, regular rhythm, S1 normal and S2 normal.  Exam reveals no gallop, no S3, no S4 and no friction rub.    Murmur heard.  Pulmonary/Chest: Effort normal and breath sounds normal. No stridor. No respiratory distress. She has no wheezes. She has no rales. She exhibits no tenderness.   Abdominal: Soft. Bowel sounds are normal. She exhibits no distension and no mass. There is no tenderness. There is no rebound.   Genitourinary:   Genitourinary Comments: Deferred   Musculoskeletal: Normal range of motion. She exhibits no edema, tenderness or deformity.   Lymphadenopathy:     She has no cervical adenopathy.   Neurological: She is alert and oriented to person, place, and time. She exhibits normal muscle tone. Coordination normal.   Skin: Skin is warm and dry. No rash noted. She is not diaphoretic. No erythema. No pallor.   Psychiatric: She has a normal mood and affect. Her behavior is normal. Judgment and thought content normal.   Nursing note and vitals reviewed.      Significant Labs:   All pertinent lab results from the last 24 hours have been reviewed. and   Recent Lab Results       07/13/18  1205 07/13/18  0545 07/13/18  0522 07/13/18  0303 07/13/18  0036      Albumin    2.3(L)      Alkaline Phosphatase    93      Allens Test  Pass        ALT    26      Anion Gap    18(H)      Aniso    Slight      AST    27      BANDS    30.0      Baso #    CANCELED  Comment:  Result canceled by the ancillary      Basophil%    0.0      Total  Bilirubin    1.2  Comment:  For infants and newborns, interpretation of results should be based  on gestational age, weight and in agreement with clinical  observations.  Premature Infant recommended reference ranges:  Up to 24 hours.............<8.0 mg/dL  Up to 48 hours............<12.0 mg/dL  3-5 days..................<15.0 mg/dL  6-29 days.................<15.0 mg/dL  (H)      Site  RR        BUN, Bld    25(H)      Vera Cells    Occasional      Calcium    9.6      Chloride    99      CO2    23      Creatinine    3.1(H)      DelSys  Adult Vent        Differential Method    Manual      eGFR if     17(A)      eGFR if non     15  Comment:  Calculation used to obtain the estimated glomerular filtration  rate (eGFR) is the CKD-EPI equation.   (A)      Eos #    CANCELED  Comment:  Result canceled by the ancillary      Eosinophil%    0.0      FiO2  45        Large/Giant Platelets    Present      Glucose    150(H)      Gran%    27.0(L)      Hematocrit    34.1(L)      Hemoglobin    10.3(L)      Hepatitis B Surface Ag          Hypo    Occasional      Lymph #    CANCELED  Comment:  Result canceled by the ancillary      Lymph%    10.0(L)      Magnesium    1.8      MCH    24.2(L)      MCHC    30.2(L)      MCV    80(L)      Metamyelocytes    15.0      Mode  AC/PRVC        Mono #    CANCELED  Comment:  Result canceled by the ancillary      Mono%    4.0      MPV    SEE COMMENT  Comment:  Result not available.      Myelocytes    14.0      Ovalocytes    Occasional      Pathologist Review Peripheral Smear    REVIEWED  Comment:  Electronically reviewed and signed by:  Melvin Smith MD  Signed on 07/13/18 at 07:32  Pathologist's interpretation of peripheral smear:  White cells: Mild leukocytosis with left shift.  Red cells:   Microcytic anemia.  Platelets: Thrombocytopenia.        PEEP  8        Phosphorus    3.3      Platelet Estimate    Appears normal      Platelets    121(L)      POC BE  5         POC HCO3  28.8(H)        POC PCO2  40.1        POC PH  7.464(H)        POC PO2  158(H)        POC SATURATED O2  100        POCT Glucose 130(H)  147(H)  162(H)     Poik    Slight      Poly    Occasional      Potassium    4.1      Total Protein    6.2      Rate  20        RBC    4.26      RDW    19.8(H)      Sample  ARTERIAL        Schistocytes    Present      Sodium    140      Spherocytes    Occasional      Stomatocytes    Present      Target Cells    Occasional      Tear Drop Cells    Occasional      Vancomycin, Random    26.0      Vt  350        WBC    13.43(H)                  07/12/18  1843 07/12/18  1534      Albumin       Alkaline Phosphatase       Allens Test       ALT       Anion Gap       Aniso       AST       BANDS       Baso #       Basophil%       Total Bilirubin       Site       BUN, Bld       Clinton Cells       Calcium       Chloride       CO2       Creatinine       DelSys       Differential Method       eGFR if        eGFR if non        Eos #       Eosinophil%       FiO2       Large/Giant Platelets       Glucose       Gran%       Hematocrit       Hemoglobin       Hepatitis B Surface Ag  Negative     Hypo       Lymph #       Lymph%       Magnesium       MCH       MCHC       MCV       Metamyelocytes       Mode       Mono #       Mono%       MPV       Myelocytes       Ovalocytes       Pathologist Review Peripheral Smear       PEEP       Phosphorus       Platelet Estimate       Platelets       POC BE       POC HCO3       POC PCO2       POC PH       POC PO2       POC SATURATED O2       POCT Glucose 109      Poik       Poly       Potassium       Total Protein       Rate       RBC       RDW       Sample       Schistocytes       Sodium       Spherocytes       Stomatocytes       Target Cells       Tear Drop Cells       Vancomycin, Random       Vt       WBC             Significant Imaging: Echocardiogram:   2D echo with color flow doppler:   Results for orders placed or performed  during the hospital encounter of 07/11/18   2D echo with color flow doppler   Result Value Ref Range    EF 60 55 - 65    Aortic Valve Stenosis MODERATE (A)     Est. PA Systolic Pressure 59.09 (A)     Mitral Valve Mobility MILDLY RESTRICTED     Tricuspid Valve Regurgitation MILD TO MODERATE     and X-Ray: CXR: X-Ray Chest 1 View (CXR):   Results for orders placed or performed during the hospital encounter of 07/11/18   X-Ray Chest 1 View    Narrative    EXAMINATION:  XR CHEST 1 VIEW    CLINICAL HISTORY:  intubation;    COMPARISON:  07/11/2018    FINDINGS:  There has been interval placement of an NG tube.  The tip is not included on the film.  An endotracheal tube remains in place.  There is mild cardiomegaly.  There is prominence of the right hilar region.  There is a moderate amount of alveolar consolidation scattered throughout the left lung.  There is no pneumothorax.  The costophrenic angles are sharp.      Impression    1. There has been interval placement of an NG tube. The tip is not included on the film.  2. There is a moderate amount of alveolar consolidation scattered throughout the left lung. There is prominence of the right hilar region.  This is characteristic of pneumonia and adenopathy.  I recommend radiographic follow-up until complete resolution of the lungs.  3. There is mild cardiomegaly.  4. An endotracheal tube remains in place.  .      Electronically signed by: Colton Gaviria MD  Date:    07/12/2018  Time:    08:20     Assessment and Plan:     Brief HPI: no acute events o/n    Severe sepsis    Cont tx per ICU team        Acute respiratory failure    Cont vent per ICU, wean as tolerated         Cardiac arrest    CTA neg for PE  Pt also has PNA on CXR leading to hypoxic arrest  Cont care per ICU        Elevated troponin    In setting of PEA cardiac arrest  Trend enzymes  Will discuss ishcemic workup once stable  2D ECHO without WMA         ESRD on hemodialysis    Cont HD per nephrology              VTE Risk Mitigation         Ordered     heparin (porcine) injection 5,000 Units  Every 8 hours      07/11/18 1811      Critical Care Time:  35 minutes    Critical secondary to Patient has a condition that poses threat to life and bodily function:      septic shock, cardiac arrest, elevated troponin    Critical care was time spent personally by me on the following activities: development of treatment plan with patient or surrogate and bedside caregivers, discussions with consultants, evaluation of patient's response to treatment, examination of patient, ordering and performing treatments and interventions, ordering and review of laboratory studies, ordering and review of cardiac and radiographic studies, telemetry and re-evaluation of patient's condition. This critical care time did not overlap with that of any other provider or involve time for any procedures.    Manny Avina Md, MD  Cardiology  [facility]

## 2018-07-13 NOTE — ASSESSMENT & PLAN NOTE
Patient had hemodialysis today.  Nephrology consult.  7/12 no need for HD today  7/13 HD as per Renal.  From respiratory standpoint O2 requirement acceptable no concern for fluid overload.

## 2018-07-13 NOTE — ASSESSMENT & PLAN NOTE
O2/mechanical ventilation/pulmonary toilet  Daily ABG and chest x-ray  After post intubation ABG, tidal volume adjust to 350, FiO2 70%, keep respiratory rate at 22, peep 5 cm water.  7/12O2/mechanical ventilation/pulmonary toilet , Daily ABG and chest x-ray. Decrease RR to 16   7/13 decrease FiO2 to 35%.

## 2018-07-13 NOTE — PROCEDURES
ELECTROENCEPHALOGRAM PROCEDURE NOTE  History of presenting illness:  Patient with metabolic issues, s/p cardiac arrest, on dialysis, now unable to follow commands, does not repond to verbal or painful stimuli, has eye fluttering episodes  Reason for EEG: rule out seizures vs Non convulsive status epilepticus  History of seizures:  no        EEG: This study is done to evaluate for change in mental status/Seizures/Encephalopathy    This is a multichannel digital EEG recording using the international 10-20 placement system.       EEG begins with The resting record is fairly well organized and symmetric.   EEG is bihemispheric  Diffusely slow.  There is mild suppression intermittently. There are intermittent spikes in the left temporal area, triphasic waves are seen. Posterior dominant rhythm is poorly formed and modulated. Eyes remains close through out the EEG  Stage II sleep was not achieved. Hyperventilation was not performed.    IMPRESSION: This is an abnormal EEG recording because of the diffusely slow bihemispheric. There are intermittent sharps.  TNo seizures are recorded. This kind of EEG can be seen in extreme sedation, metabolic and or toxic Encephalopathy. Hypoxic encephalopathy could also present similar EEG.  Clinical correlation is suggested as to the etiology of this. If indicated, repeat EEG and/or 24-hour ambulatory EEG monitoring might be useful in the future.          Nirav Veronica., MD., Ph.D., MS

## 2018-07-13 NOTE — PLAN OF CARE
Problem: Patient Care Overview  Goal: Plan of Care Review  Outcome: Ongoing (interventions implemented as appropriate)  Pt stable. Pt is NSR on the heart monitor.  Afebrile this shift.  Pt remains intubated not on sedatation.  Pt withdrawing to pain at times, not following commands and not moving extremities.  Pt received scheduled abxs.  Martinez in place with no UOP, martinez flushed with 25ml sterile water, flush returned.  Tube feeding infusing to OG tube at 25ml/hr (at goal), max residual 10ml.  Pt placed on SW bed with rotation therapy; nemesio feet in air boots.  Left second toe dressing completed by wound care yesterday remains in place.  PIV and right EJ intact with no redness, swelling or drainage.  Bed low, wheels locked.  Plan of care reviewed with pt family. Will continue to monitor.

## 2018-07-14 PROBLEM — G93.40 ENCEPHALOPATHY: Status: ACTIVE | Noted: 2018-07-14

## 2018-07-14 PROBLEM — N30.00 ACUTE CYSTITIS: Status: ACTIVE | Noted: 2018-07-14

## 2018-07-14 LAB
ALBUMIN SERPL BCP-MCNC: 2.4 G/DL
ALLENS TEST: ABNORMAL
ALP SERPL-CCNC: 111 U/L
ALT SERPL W/O P-5'-P-CCNC: 19 U/L
ANION GAP SERPL CALC-SCNC: 15 MMOL/L
AST SERPL-CCNC: 19 U/L
BACTERIA UR CULT: NORMAL
BASOPHILS # BLD AUTO: 0.01 K/UL
BASOPHILS NFR BLD: 0.1 %
BILIRUB SERPL-MCNC: 0.9 MG/DL
BUN SERPL-MCNC: 47 MG/DL
CALCIUM SERPL-MCNC: 10 MG/DL
CHLORIDE SERPL-SCNC: 99 MMOL/L
CO2 SERPL-SCNC: 26 MMOL/L
CREAT SERPL-MCNC: 3.9 MG/DL
DELSYS: ABNORMAL
DIFFERENTIAL METHOD: ABNORMAL
EOSINOPHIL # BLD AUTO: 0.1 K/UL
EOSINOPHIL NFR BLD: 0.7 %
ERYTHROCYTE [DISTWIDTH] IN BLOOD BY AUTOMATED COUNT: 19.7 %
ERYTHROCYTE [SEDIMENTATION RATE] IN BLOOD BY WESTERGREN METHOD: 16 MM/H
EST. GFR  (AFRICAN AMERICAN): 13 ML/MIN/1.73 M^2
EST. GFR  (NON AFRICAN AMERICAN): 11 ML/MIN/1.73 M^2
FIO2: 35
GLUCOSE SERPL-MCNC: 136 MG/DL
HCO3 UR-SCNC: 29.8 MMOL/L (ref 24–28)
HCT VFR BLD AUTO: 32.7 %
HEP. B SURF AB, QUAL: POSITIVE
HEP. B SURF AB, QUANT.: 218 MIU/ML
HGB BLD-MCNC: 10.1 G/DL
LYMPHOCYTES # BLD AUTO: 1.4 K/UL
LYMPHOCYTES NFR BLD: 8.5 %
MAGNESIUM SERPL-MCNC: 2.2 MG/DL
MCH RBC QN AUTO: 24.6 PG
MCHC RBC AUTO-ENTMCNC: 30.9 G/DL
MCV RBC AUTO: 80 FL
MODE: ABNORMAL
MONOCYTES # BLD AUTO: 0.7 K/UL
MONOCYTES NFR BLD: 4.4 %
NEUTROPHILS # BLD AUTO: 14.1 K/UL
NEUTROPHILS NFR BLD: 88.1 %
PCO2 BLDA: 44.4 MMHG (ref 35–45)
PEEP: 8
PH SMN: 7.44 [PH] (ref 7.35–7.45)
PHOSPHATE SERPL-MCNC: 3.5 MG/DL
PLATELET # BLD AUTO: 108 K/UL
PMV BLD AUTO: ABNORMAL FL
PO2 BLDA: 80 MMHG (ref 80–100)
POC BE: 6 MMOL/L
POC SATURATED O2: 96 % (ref 95–100)
POCT GLUCOSE: 139 MG/DL (ref 70–110)
POCT GLUCOSE: 140 MG/DL (ref 70–110)
POCT GLUCOSE: 141 MG/DL (ref 70–110)
POCT GLUCOSE: 145 MG/DL (ref 70–110)
POCT GLUCOSE: 162 MG/DL (ref 70–110)
POTASSIUM SERPL-SCNC: 4.2 MMOL/L
PROT SERPL-MCNC: 6.5 G/DL
RBC # BLD AUTO: 4.1 M/UL
SAMPLE: ABNORMAL
SITE: ABNORMAL
SODIUM SERPL-SCNC: 140 MMOL/L
VT: 350
WBC # BLD AUTO: 16.31 K/UL

## 2018-07-14 PROCEDURE — 99900035 HC TECH TIME PER 15 MIN (STAT)

## 2018-07-14 PROCEDURE — 25000003 PHARM REV CODE 250: Performed by: INTERNAL MEDICINE

## 2018-07-14 PROCEDURE — 20000000 HC ICU ROOM

## 2018-07-14 PROCEDURE — 63600175 PHARM REV CODE 636 W HCPCS: Performed by: PSYCHIATRY & NEUROLOGY

## 2018-07-14 PROCEDURE — 84100 ASSAY OF PHOSPHORUS: CPT

## 2018-07-14 PROCEDURE — 80053 COMPREHEN METABOLIC PANEL: CPT

## 2018-07-14 PROCEDURE — 83735 ASSAY OF MAGNESIUM: CPT

## 2018-07-14 PROCEDURE — 63600175 PHARM REV CODE 636 W HCPCS: Performed by: INTERNAL MEDICINE

## 2018-07-14 PROCEDURE — 85025 COMPLETE CBC W/AUTO DIFF WBC: CPT

## 2018-07-14 PROCEDURE — S0028 INJECTION, FAMOTIDINE, 20 MG: HCPCS | Performed by: INTERNAL MEDICINE

## 2018-07-14 PROCEDURE — 36600 WITHDRAWAL OF ARTERIAL BLOOD: CPT

## 2018-07-14 PROCEDURE — 25000003 PHARM REV CODE 250: Performed by: PSYCHIATRY & NEUROLOGY

## 2018-07-14 PROCEDURE — 99232 SBSQ HOSP IP/OBS MODERATE 35: CPT | Mod: ,,, | Performed by: INTERNAL MEDICINE

## 2018-07-14 PROCEDURE — 36415 COLL VENOUS BLD VENIPUNCTURE: CPT

## 2018-07-14 PROCEDURE — 94003 VENT MGMT INPAT SUBQ DAY: CPT

## 2018-07-14 PROCEDURE — 82803 BLOOD GASES ANY COMBINATION: CPT

## 2018-07-14 PROCEDURE — 25000242 PHARM REV CODE 250 ALT 637 W/ HCPCS: Performed by: NURSE PRACTITIONER

## 2018-07-14 PROCEDURE — 94640 AIRWAY INHALATION TREATMENT: CPT

## 2018-07-14 PROCEDURE — 99291 CRITICAL CARE FIRST HOUR: CPT | Mod: ,,, | Performed by: INTERNAL MEDICINE

## 2018-07-14 PROCEDURE — 94668 MNPJ CHEST WALL SBSQ: CPT

## 2018-07-14 PROCEDURE — 25000003 PHARM REV CODE 250: Performed by: NURSE PRACTITIONER

## 2018-07-14 RX ORDER — LEVETIRACETAM 5 MG/ML
500 INJECTION INTRAVASCULAR
Status: DISCONTINUED | OUTPATIENT
Start: 2018-07-16 | End: 2018-07-18

## 2018-07-14 RX ORDER — CIPROFLOXACIN 2 MG/ML
400 INJECTION, SOLUTION INTRAVENOUS
Status: DISCONTINUED | OUTPATIENT
Start: 2018-07-14 | End: 2018-07-16

## 2018-07-14 RX ORDER — LEVETIRACETAM 5 MG/ML
500 INJECTION INTRAVASCULAR EVERY 12 HOURS
Status: DISCONTINUED | OUTPATIENT
Start: 2018-07-14 | End: 2018-07-18

## 2018-07-14 RX ORDER — MODAFINIL 100 MG/1
200 TABLET ORAL ONCE
Status: COMPLETED | OUTPATIENT
Start: 2018-07-14 | End: 2018-07-14

## 2018-07-14 RX ORDER — LEVETIRACETAM 500 MG/1
500 TABLET ORAL 2 TIMES DAILY
Status: DISCONTINUED | OUTPATIENT
Start: 2018-07-14 | End: 2018-07-14

## 2018-07-14 RX ADMIN — LEVETIRACETAM 500 MG: 5 INJECTION INTRAVENOUS at 09:07

## 2018-07-14 RX ADMIN — SODIUM CHLORIDE 3 G: 9 INJECTION, SOLUTION INTRAVENOUS at 07:07

## 2018-07-14 RX ADMIN — IPRATROPIUM BROMIDE AND ALBUTEROL SULFATE 3 ML: .5; 3 SOLUTION RESPIRATORY (INHALATION) at 07:07

## 2018-07-14 RX ADMIN — HEPARIN SODIUM 5000 UNITS: 5000 INJECTION, SOLUTION INTRAVENOUS; SUBCUTANEOUS at 09:07

## 2018-07-14 RX ADMIN — CIPROFLOXACIN 400 MG: 2 INJECTION, SOLUTION INTRAVENOUS at 03:07

## 2018-07-14 RX ADMIN — IPRATROPIUM BROMIDE AND ALBUTEROL SULFATE 3 ML: .5; 3 SOLUTION RESPIRATORY (INHALATION) at 01:07

## 2018-07-14 RX ADMIN — IPRATROPIUM BROMIDE AND ALBUTEROL SULFATE 3 ML: .5; 3 SOLUTION RESPIRATORY (INHALATION) at 12:07

## 2018-07-14 RX ADMIN — FAMOTIDINE 20 MG: 10 INJECTION INTRAVENOUS at 09:07

## 2018-07-14 RX ADMIN — ACETYLCYSTEINE 4 ML: 100 SOLUTION ORAL; RESPIRATORY (INHALATION) at 08:07

## 2018-07-14 RX ADMIN — HEPARIN SODIUM 5000 UNITS: 5000 INJECTION, SOLUTION INTRAVENOUS; SUBCUTANEOUS at 05:07

## 2018-07-14 RX ADMIN — CHLORHEXIDINE GLUCONATE 15 ML: 1.2 RINSE ORAL at 09:07

## 2018-07-14 RX ADMIN — ACETYLCYSTEINE 4 ML: 100 SOLUTION ORAL; RESPIRATORY (INHALATION) at 07:07

## 2018-07-14 RX ADMIN — MODAFINIL 200 MG: 100 TABLET ORAL at 11:07

## 2018-07-14 RX ADMIN — POLYETHYLENE GLYCOL (3350) 17 G: 17 POWDER, FOR SOLUTION ORAL at 09:07

## 2018-07-14 RX ADMIN — HEPARIN SODIUM 5000 UNITS: 5000 INJECTION, SOLUTION INTRAVENOUS; SUBCUTANEOUS at 02:07

## 2018-07-14 NOTE — PROGRESS NOTES
[facility]  Critical Care Medicine  Progress Note    Patient Name: Talia Mcfarland  MRN: [id number]  Admission Date: 7/11/2018  Hospital Length of Stay: 3 days  Code Status: Prior  Attending Provider: Zayda England MD  Primary Care Provider: Tank Castro MD   Principal Problem: HAP (hospital-acquired pneumonia)    Subjective:     HPI:  68-year-old female patient brought by EMS to the emergency department from dialysis after she complained of malaise and had altered mental status after dialysis today.    Hospital/ICU Course:  While in the ER the patient had pulseless electrical activity and she had CPR performed for about 6 min.  Patient was seen and examined in the emergency room.  She was intubated.  Chest x-ray post intubation showed low ET tube ET tube was pulled out.  Repeat chest x-ray showed good position of ET tube.  7/12 Seen and examined. Intubated, no sedation, no pressors. Tmax 100.8. Bld cx GNR. CXR and ABG reviewed.   7/13 patient seen and examined, intubated, not sedated no pressors.  Chest x-ray and ABG reviewed no changes in mental status.  Breathing over the vent.  Not moving extremities unresponsive.  7/14 seen and examined, intubated, no sedation.  Chest x-ray and ABG reviewed.  No overall change in patient's status.  Had dialysis today.    Interval History:   Review of Systems   Unable to perform ROS: Intubated       Objective:     Vital Signs (Most Recent):  Temp: 98.9 °F (37.2 °C) (07/14/18 0700)  Pulse: 94 (07/14/18 1100)  Resp: 20 (07/14/18 1100)  BP: (!) 155/59 (07/14/18 1000)  SpO2: 100 % (07/14/18 1100) Vital Signs (24h Range):  Temp:  [98 °F (36.7 °C)-98.9 °F (37.2 °C)] 98.9 °F (37.2 °C)  Pulse:  [] 94  Resp:  [15-29] 20  SpO2:  [97 %-100 %] 100 %  BP: ()/(37-96) 155/59     Weight: 52 kg (114 lb 10.2 oz)  Body mass index is 23.15 kg/m².      Intake/Output Summary (Last 24 hours) at 07/14/18 1229  Last data filed at 07/14/18 0900   Gross per 24 hour    Intake              685 ml   Output             4500 ml   Net            -3815 ml       Physical Exam   Constitutional:   Ill appearing, frail   HENT:   ETT +    Eyes:   Constricted weakly reactive   Neck: Neck supple.   Cardiovascular:   Tachy at 87    Pulmonary/Chest: Effort normal. No respiratory distress.   + ronchi bilaterally    Abdominal: Soft. There is no tenderness.   Musculoskeletal: She exhibits no edema.   Neurological:   Unresponsive , not moving extremities   One breath over the vent   Skin: Skin is warm.       Vents:  Vent Mode: A/C (07/14/18 1100)  Ventilator Initiated: Yes (07/11/18 1454)  Set Rate: 16 bmp (07/14/18 1100)  Vt Set: 350 mL (07/14/18 1100)  Pressure Support: 0 cmH20 (07/14/18 1100)  PEEP/CPAP: 8 cmH20 (07/14/18 1100)  Oxygen Concentration (%): 35 (07/14/18 1100)  Peak Airway Pressure: 24 cmH2O (07/14/18 1100)  Plateau Pressure: 0 cmH20 (07/14/18 1100)  Total Ve: 6.55 mL (07/14/18 1100)  F/VT Ratio<105 (RSBI): (!) 60.06 (07/14/18 1100)    Lines/Drains/Airways     Drain                 Hemodialysis AV Fistula Left upper arm -- days         NG/OG Tube 07/11/18 1811 orogastric 18 Fr. Left mouth 2 days          Airway                 Airway - Non-Surgical 07/11/18 1426 Endotracheal Tube 2 days          Pressure Ulcer                 Pressure Injury 07/12/18 Left Heel Deep tissue injury 2 days         Pressure Injury 07/12/18 Right Heel Deep tissue injury 2 days          Peripheral Intravenous Line                 Peripheral IV - Single Lumen 07/11/18 1240 Right Forearm 2 days         Peripheral IV - Single Lumen 07/11/18 1438 Right  2 days                Significant Labs:    CBC/Anemia Profile:    Recent Labs  Lab 07/13/18  0303 07/14/18  0405   WBC 13.43* 16.31*   HGB 10.3* 10.1*   HCT 34.1* 32.7*   * 108*   MCV 80* 80*   RDW 19.8* 19.7*        Chemistries:    Recent Labs  Lab 07/13/18  0303 07/14/18  0405    140   K 4.1 4.2   CL 99 99   CO2 23 26   BUN 25* 47*   CREATININE  3.1* 3.9*   CALCIUM 9.6 10.0   ALBUMIN 2.3* 2.4*   PROT 6.2 6.5   BILITOT 1.2* 0.9   ALKPHOS 93 111   ALT 26 19   AST 27 19   MG 1.8 2.2   PHOS 3.3 3.5    Acinetobacter in the blood pansensitive  Significant Imaging:  CXR: I have reviewed all pertinent results/findings within the past 24 hours and my personal findings are:  ET tube in good position.  Improving infiltrates.      Assessment/Plan:     Pulmonary   * HAP (hospital-acquired pneumonia)    Bld cx GNR , IV Zosyn , If resp cx negative for MRSA, dc Vanco  7/13 blood culture positive for Acinetobacter.  Will DC vancomycin.  Continue Unasyn.  7/14 currently on Unasyn.          Acute respiratory failure    O2/mechanical ventilation/pulmonary toilet  Daily ABG and chest x-ray  After post intubation ABG, tidal volume adjust to 350, FiO2 70%, keep respiratory rate at 22, peep 5 cm water.  7/12O2/mechanical ventilation/pulmonary toilet , Daily ABG and chest x-ray. Decrease RR to 16   7/13 decrease FiO2 to 35%.  7/14 no change in med settings        Cardiac/Vascular   Cardiac arrest    ICU monitoring  Coiling protocol not applied, patient had PA, non shockable rhythm.  Cardiology consult  7/12 Levophed if needed keep MAP > 65 mm Hg   7/13 same  7/14 no improvement in neurological status.        Renal/   ESRD on hemodialysis    Patient had hemodialysis today.  Nephrology consult.  7/12 no need for HD today  7/13 HD as per Renal.  From respiratory standpoint O2 requirement acceptable no concern for fluid overload.  7/14 spoke with the nephrology will hold on dialysis.        ID   Severe sepsis    Broad-spectrum antibiotic with IV Zosyn and vancomycin    7/12 GNR septicemia. IV Zosyn.  7/14 bacteremia Acinetobacter pansensitive , E coli in the urine awaiting sensitivity           Critical Care Daily Checklist:    A: Awake: RASS Goal/Actual Goal: RASS Goal: 0-->alert and calm  Actual: [place] Agitation Sedation Scale (RASS): Deep sedation   B: Spontaneous Breathing  Trial Performed?     C: SAT & SBT Coordinated?  Not applicable                      D: Delirium: CAM-ICU Overall CAM-ICU: Positive   E: Early Mobility Performed? No.  Intubated   F: Feeding Goal: Goals: Meet > 85 % EEN/EPN while admitted  Status: Nutrition Goal Status: new   Current Diet Order   No orders of the defined types were placed in this encounter.      AS: Analgesia/Sedation No sedation   T: Thromboembolic Prophylaxis Heparin 5000 units subcu q.8 hours   H: HOB > 300 Yes   U: Stress Ulcer Prophylaxis (if needed) Famotidine   G: Glucose Control Fingerstick as needed   B: Bowel Function Stool Occurrence: 1   I: Indwelling Catheter (Lines & Berry) Necessity Y   D: De-escalation of Antimicrobials/Pharmacotherapies Y    Plan for the day/ETD Y    Code Status:  Family/Goals of Care: Prior  Y.      Met with family twice, in the presence of a neurologist, the nurse practitioner in the registered nurse.  Discussed the grave prognosis.  Patient is still till full code at the moment.  Family to continue discuss about code status.     Critical Care Time: 35 minutes  Critical secondary to Patient has a condition that poses threat to life and bodily function:  Cardiac arrest      Critical care was time spent personally by me on the following activities: development of treatment plan with patient or surrogate and bedside caregivers, discussions with consultants, evaluation of patient's response to treatment, examination of patient, ordering and performing treatments and interventions, ordering and review of laboratory studies, ordering and review of radiographic studies, pulse oximetry, re-evaluation of patient's condition. This critical care time did not overlap with that of any other provider or involve time for any procedures.     Zena Kline MD  Critical Care Medicine  [facility]

## 2018-07-14 NOTE — ASSESSMENT & PLAN NOTE
Broad-spectrum antibiotic with IV Zosyn and vancomycin    7/12 GNR septicemia. IV Zosyn.  7/14 bacteremia Acinetobacter pansensitive , E coli in the urine awaiting sensitivity

## 2018-07-14 NOTE — SUBJECTIVE & OBJECTIVE
Interval History: she remains intubated .  Blood culture-Acinetobacter ,urine culture -E coli    Review of Systems   Unable to perform ROS: Intubated     Objective:     Vital Signs (Most Recent):  Temp: 98.4 °F (36.9 °C) (07/13/18 1500)  Pulse: 96 (07/13/18 2113)  Resp: 16 (07/13/18 2113)  BP: (!) 119/48 (07/13/18 1900)  SpO2: 100 % (07/13/18 2113) Vital Signs (24h Range):  Temp:  [98 °F (36.7 °C)-99.7 °F (37.6 °C)] 98.4 °F (36.9 °C)  Pulse:  [] 96  Resp:  [14-29] 16  SpO2:  [74 %-100 %] 100 %  BP: ()/(35-72) 119/48     Weight: 54.2 kg (119 lb 7.8 oz)  Body mass index is 24.13 kg/m².    Estimated Creatinine Clearance: 14.9 mL/min (A) (based on SCr of 3.1 mg/dL (H)).    Physical Exam   Constitutional:   Ill appearing, frail   HENT:   ETT +    Eyes:   Sluggish pupillary reaction to light   Neck: Neck supple.   Cardiovascular:   Tachy at 87    Pulmonary/Chest: Effort normal. No respiratory distress.   + ronchi bilaterally    Abdominal: Soft. There is no tenderness.   Musculoskeletal: She exhibits no edema.   Neurological:   Unresponsive , not moving extremities    Skin: Skin is warm.   Nursing note and vitals reviewed.      Significant Labs:   Blood Culture:   Recent Labs  Lab 07/11/18  1220 07/11/18  1240   LABBLOO Gram stain aer bottle: Gram negative rods   Results called to and read back by: Robert Staples RN 07/12/2018  06:20  ACINETOBACTER SPECIESOrganism further identified as Acinetobacter pittiiFor susceptibility see order #[id number] Gram stain aer bottle: Gram negative rods   Results called to and read back by: Robert Staples RN 07/12/2018  06:20  ACINETOBACTER SPECIESOrganism further identified as Acinetobacter pittiiSusceptibility pending     CBC:   Recent Labs  Lab 07/12/18  0350 07/13/18  0303   WBC 7.14 13.43*   HGB 10.9* 10.3*   HCT 36.5* 34.1*   * 121*     All pertinent labs within the past 24 hours have been reviewed.    Significant Imaging: I have reviewed all pertinent imaging  results/findings within the past 24 hours.

## 2018-07-14 NOTE — ASSESSMENT & PLAN NOTE
Bld cx GNR , IV Zosyn , If resp cx negative for MRSA, dc Vanco  7/13 blood culture positive for Acinetobacter.  Will DC vancomycin.  Continue Unasyn.  7/14 currently on Unasyn.

## 2018-07-14 NOTE — PROGRESS NOTES
[facility] -   Nephrology  Progress Note    Patient Name: Talia Mcfarland  MRN: [id number]  Admission Date: 7/11/2018  Hospital Length of Stay: 3 days  Attending Provider: Zayda England MD   Primary Care Physician: Tank Castro MD  Principal Problem:HAP (hospital-acquired pneumonia)    Subjective:     HPI: Talia Mcfarland is a 78 y old AA woman with h/o HTN, PAD , ESRD on HD ( MWF , [facility] ) , DM-2 , highly noncompliant with outpatient dialysis treatments, patient misses at least 1 treatment every week for outpatient dialysis team, patient went to hemodialysis treatment today, she had some fever and nausea at the dialysis unit, and a set of blood cultures were drawn and patient received a g of vancomycin and was sent to the emergency room, while she was in the ER here patient developed cardiac arrest requiring CPR and was intubated.  Patient started on broad-spectrum antibiotics.  She also has a wound on her left ft, significant peripheral edema noted likely due to nonadherence with dialysis.  CT chest done on admission showed significant left-sided pneumonia.    Interval History:  Patient is still intubated.  No response on modafinil and no response on Keppra.  Status post family meeting with ICU team and Dr. Veronica    Review of patient's allergies indicates:   Allergen Reactions    Tylenol [acetaminophen] Other (See Comments)     Headache     Current Facility-Administered Medications   Medication Frequency    acetylcysteine 100 mg/ml (10%) solution 4 mL BID    albumin human 25% bottle 12.5 g PRN    albuterol-ipratropium 2.5 mg-0.5 mg/3 mL nebulizer solution 3 mL Q6H    ampicillin-sulbactam 3 g in sodium chloride 0.9 % 100 mL IVPB (ready to mix system) Q24H    bisacodyl suppository 10 mg Daily PRN    chlorhexidine 0.12 % solution 15 mL BID    dextrose 50% injection 12.5 g PRN    famotidine (PF) injection 20 mg Daily    glucagon (human recombinant) injection 1 mg PRN    heparin  (porcine) injection 5,000 Units Q8H    insulin aspart U-100 pen 0-5 Units Q6H PRN    levETIRAcetam in NaCl (iso-os) IVPB 500 mg Q12H    [START ON 7/16/2018] levETIRAcetam in NaCl (iso-os) IVPB 500 mg Every Mon, Wed, Fri    polyethylene glycol packet 17 g Daily       Objective:     Vital Signs (Most Recent):  Temp: 98.9 °F (37.2 °C) (07/14/18 0700)  Pulse: 94 (07/14/18 1100)  Resp: 20 (07/14/18 1100)  BP: (!) 155/59 (07/14/18 1000)  SpO2: 100 % (07/14/18 1100)  O2 Device (Oxygen Therapy): ventilator (07/14/18 1100) Vital Signs (24h Range):  Temp:  [98 °F (36.7 °C)-98.9 °F (37.2 °C)] 98.9 °F (37.2 °C)  Pulse:  [] 94  Resp:  [15-29] 20  SpO2:  [100 %] 100 %  BP: ()/(37-96) 155/59     Weight: 52 kg (114 lb 10.2 oz) (07/14/18 0300)  Body mass index is 23.15 kg/m².  Body surface area is 1.47 meters squared.    I/O last 3 completed shifts:  In: 1360 [NG/GT:860; IV Piggyback:500]  Out: 4500 [Other:4500]    Physical Exam   Constitutional:   Ill appearing, frail   HENT:   ETT +    Eyes:   Pupils react some according to the neurology examination   Neck: Neck supple.   Cardiovascular:   Tachycardic   Pulmonary/Chest: Effort normal. No respiratory distress.   + ronchi bilaterally    Abdominal: Soft. There is no tenderness.   Musculoskeletal: She exhibits no edema.   Neurological:   Unresponsive , not moving extremities      Skin: Skin is warm.   Nursing note and vitals reviewed.      Significant Labs:  All labs within the past 24 hours have been reviewed.     Significant Imaging:  Labs: Reviewed    Assessment/Plan:     ESRD on hemodialysis    Family meeting with patient's .  Noted family meeting with Neurology and ICU team.  Family will make a decision about her code status and her support system.  For now no need for dialysis today.  Will follow daily for dialysis needs.  Poor prognosis discussed with the .  Recommend comfort measures if the family agrees            Thank you for your consult.      Gurvinder Rios MD  Nephrology  [facility]

## 2018-07-14 NOTE — ASSESSMENT & PLAN NOTE
Continue ventilatory support .  Critical care follow up    07/13- will wean off ventilatory support if feasible

## 2018-07-14 NOTE — ASSESSMENT & PLAN NOTE
Family meeting with patient's .  Noted family meeting with Neurology and ICU team.  Family will make a decision about her code status and her support system.  For now no need for dialysis today.  Will follow daily for dialysis needs.  Poor prognosis discussed with the .  Recommend comfort measures if the family agrees

## 2018-07-14 NOTE — PROGRESS NOTES
[facility] -   Cardiology  Progress Note    Patient Name: Talia Mcfarland  MRN: [id number]  Admission Date: 7/11/2018  Hospital Length of Stay: 3 days  Code Status: Prior   Attending Physician: Zayda England MD   Primary Care Physician: Tank Castro MD  Expected Discharge Date:   Principal Problem:HAP (hospital-acquired pneumonia)    Subjective:     Hospital Course:   7/12/18 - pt remains intubated off pressors, resumed HD; no improvement in mental status  7/13/18 - No acute events o/n, no change in mental status; neurology to evaluate, poor prognosis  7/14/18 - Family meeting today with neurology and staff discussing prognosis, pt made DNR; no neuro recovery    Interval History: no acute events, no neuro improvement     Review of Systems   Unable to perform ROS: intubated     Objective:     Vital Signs (Most Recent):  Temp: 98.1 °F (36.7 °C) (07/14/18 1100)  Pulse: 93 (07/14/18 1352)  Resp: 19 (07/14/18 1352)  BP: (!) 134/51 (07/14/18 1300)  SpO2: 100 % (07/14/18 1352) Vital Signs (24h Range):  Temp:  [98.1 °F (36.7 °C)-98.9 °F (37.2 °C)] 98.1 °F (36.7 °C)  Pulse:  [] 93  Resp:  [15-29] 19  SpO2:  [100 %] 100 %  BP: (107-160)/(37-96) 134/51     Weight: 52 kg (114 lb 10.2 oz)  Body mass index is 23.15 kg/m².     SpO2: 100 %  O2 Device (Oxygen Therapy): ventilator      Intake/Output Summary (Last 24 hours) at 07/14/18 1517  Last data filed at 07/14/18 1300   Gross per 24 hour   Intake              845 ml   Output                0 ml   Net              845 ml       Lines/Drains/Airways     Drain                 Hemodialysis AV Fistula Left upper arm -- days         NG/OG Tube 07/11/18 1811 orogastric 18 Fr. Left mouth 2 days          Airway                 Airway - Non-Surgical 07/11/18 1426 Endotracheal Tube 3 days          Pressure Ulcer                 Pressure Injury 07/12/18 Left Heel Deep tissue injury 2 days         Pressure Injury 07/12/18 Right Heel Deep tissue injury 2 days           Peripheral Intravenous Line                 Peripheral IV - Single Lumen 07/11/18 1240 Right Forearm 3 days         Peripheral IV - Single Lumen 07/11/18 1438 Right  3 days                Physical Exam   Constitutional: She is oriented to person, place, and time. She appears well-developed and well-nourished. No distress.   HENT:   Head: Normocephalic and atraumatic.   Nose: Nose normal.   Mouth/Throat: Oropharynx is clear and moist.   Eyes: Conjunctivae and EOM are normal. No scleral icterus.   Neck: Normal range of motion. Neck supple. No JVD present. No thyromegaly present.   Cardiovascular: Normal rate, regular rhythm, S1 normal and S2 normal.  Exam reveals no gallop, no S3, no S4 and no friction rub.    Murmur heard.  Pulmonary/Chest: Effort normal and breath sounds normal. No stridor. No respiratory distress. She has no wheezes. She has no rales. She exhibits no tenderness.   Abdominal: Soft. Bowel sounds are normal. She exhibits no distension and no mass. There is no tenderness. There is no rebound.   Genitourinary:   Genitourinary Comments: Deferred   Musculoskeletal: Normal range of motion. She exhibits no edema, tenderness or deformity.   Lymphadenopathy:     She has no cervical adenopathy.   Neurological: She is alert and oriented to person, place, and time. She exhibits normal muscle tone. Coordination normal.   Skin: Skin is warm and dry. No rash noted. She is not diaphoretic. No erythema. No pallor.   Psychiatric: She has a normal mood and affect. Her behavior is normal. Judgment and thought content normal.   Nursing note and vitals reviewed.      Significant Labs:   All pertinent lab results from the last 24 hours have been reviewed. and   Recent Lab Results       07/14/18  1229 07/14/18  0518 07/14/18  0514 07/14/18  0405 07/14/18  0056      Albumin    2.4(L)      Alkaline Phosphatase    111      Allens Test  Pass        ALT    19      Anion Gap    15      AST    19      Baso #    0.01       Basophil%    0.1      Total Bilirubin    0.9  Comment:  For infants and newborns, interpretation of results should be based  on gestational age, weight and in agreement with clinical  observations.  Premature Infant recommended reference ranges:  Up to 24 hours.............<8.0 mg/dL  Up to 48 hours............<12.0 mg/dL  3-5 days..................<15.0 mg/dL  6-29 days.................<15.0 mg/dL        Site  RR        BUN, Bld    47(H)      Calcium    10.0      Chloride    99      CO2    26      Creatinine    3.9(H)      DelSys  Adult Vent        Differential Method    Automated      eGFR if     13(A)      eGFR if non     11  Comment:  Calculation used to obtain the estimated glomerular filtration  rate (eGFR) is the CKD-EPI equation.   (A)      Eos #    0.1      Eosinophil%    0.7      FiO2  35        Glucose    136(H)      Gran # (ANC)    14.1(H)      Gran%    88.1(H)      Hematocrit    32.7(L)      Hemoglobin    10.1(L)      Lymph #    1.4      Lymph%    8.5(L)      Magnesium    2.2      MCH    24.6(L)      MCHC    30.9(L)      MCV    80(L)      Mode  AC/PRVC        Mono #    0.7      Mono%    4.4      MPV    SEE COMMENT  Comment:  Result not available.      PEEP  8        Phosphorus    3.5      Platelets    108(L)      POC BE  6        POC HCO3  29.8(H)        POC PCO2  44.4        POC PH  7.436        POC PO2  80        POC SATURATED O2  96        POCT Glucose 162(H)  141(H)  145(H)     Potassium    4.2      Total Protein    6.5      Rate  16        RBC    4.10      RDW    19.7(H)      Sample  ARTERIAL        Sodium    140      Vt  350        WBC    16.31(H)                  07/13/18  1808      Albumin      Alkaline Phosphatase      Allens Test      ALT      Anion Gap      AST      Baso #      Basophil%      Total Bilirubin      Site      BUN, Bld      Calcium      Chloride      CO2      Creatinine      DelSys      Differential Method      eGFR if       eGFR if  non       Eos #      Eosinophil%      FiO2      Glucose      Gran # (ANC)      Gran%      Hematocrit      Hemoglobin      Lymph #      Lymph%      Magnesium      MCH      MCHC      MCV      Mode      Mono #      Mono%      MPV      PEEP      Phosphorus      Platelets      POC BE      POC HCO3      POC PCO2      POC PH      POC PO2      POC SATURATED O2      POCT Glucose 159(H)     Potassium      Total Protein      Rate      RBC      RDW      Sample      Sodium      Vt      WBC            Significant Imaging: Echocardiogram:   2D echo with color flow doppler:   Results for orders placed or performed during the hospital encounter of 07/11/18   2D echo with color flow doppler   Result Value Ref Range    EF 60 55 - 65    Aortic Valve Stenosis MODERATE (A)     Est. PA Systolic Pressure 59.09 (A)     Mitral Valve Mobility MILDLY RESTRICTED     Tricuspid Valve Regurgitation MILD TO MODERATE     and X-Ray: CXR: X-Ray Chest 1 View (CXR):   Results for orders placed or performed during the hospital encounter of 07/11/18   X-Ray Chest 1 View    Narrative    EXAMINATION:  XR CHEST 1 VIEW    CLINICAL HISTORY:  PNA;    TECHNIQUE:  Single frontal view of the chest was performed.    COMPARISON:  07/13/2018    FINDINGS:  Endotracheal tube present at the T4-T5 level.  NG tube courses through the films.  Cardiomegaly.  Atherosclerotic thoracic aorta.  Patchy airspace disease in the left upper lung zone somewhat improved compared to 07/13/2018.  Air bronchograms at the left lung base.      Impression    Tubes in good position.  Probable improving airspace disease in the left upper lung zone, air bronchograms identified in the retrocardiac region      Electronically signed by: Benedict Reich MD  Date:    07/14/2018  Time:    08:34     Assessment and Plan:     Brief HPI: no acute events, fam meeting today - pt DNR    Severe sepsis    Cont tx per ICU team        Acute respiratory failure    Cont vent per ICU, wean as  tolerated         Cardiac arrest    CTA neg for PE  Pt also has PNA on CXR leading to hypoxic arrest  Cont care per ICU         Elevated troponin    In setting of PEA cardiac arrest  Trend enzymes  2D ECHO without WMA    No further workup until neuro status improves        ESRD on hemodialysis    Cont HD per nephrology             VTE Risk Mitigation         Ordered     heparin (porcine) injection 5,000 Units  Every 8 hours      07/11/18 1811      Critical Care Time:  34 minutes  Critical secondary to Patient has a condition that poses threat to life and bodily function:    cardiac arrest, resp failure, anoxic brain injury  Critical care was time spent personally by me on the following activities: development of treatment plan with patient or surrogate and bedside caregivers, discussions with consultants, evaluation of patient's response to treatment, examination of patient, ordering and performing treatments and interventions, ordering and review of laboratory studies, ordering and review of cardiac and radiographic studies, telemetry and re-evaluation of patient's condition. This critical care time did not overlap with that of any other provider or involve time for any procedures.    Manny Avina Md, MD  Cardiology  [facility] -

## 2018-07-14 NOTE — PLAN OF CARE
Problem: Patient Care Overview  Goal: Plan of Care Review  Outcome: Ongoing (interventions implemented as appropriate)  Pt with no improvements today; Dr. Veronica ordered EEG and gave results to daughter, son-in-law, and sister Kami; family will meet with MDs tomorrow at noon.

## 2018-07-14 NOTE — ASSESSMENT & PLAN NOTE
-initial rhythm as per ed notes PEA  - two rounds of cpr with rosc   - echo with right heart strain and elevated PA and increase central vein pressure pe was rule out cta negative   - respiratory arrest in setting of pneumonia  7/12  No improvement in mental status   Will continue with vent support   7/13  No improvement in mental status neurology on board

## 2018-07-14 NOTE — ASSESSMENT & PLAN NOTE
ICU monitoring  Cooling protocol not applied, patient had PA, non shockable rhythm.  Cardiology consult  7/12 Levophed if needed keep MAP > 65 mm Hg   7/13 same  7/14 no improvement in neurological status.

## 2018-07-14 NOTE — SUBJECTIVE & OBJECTIVE
Interval History:  Patient is still intubated.  No response on modafinil and no response on Keppra.  Status post family meeting with ICU team and Dr. Veronica    Review of patient's allergies indicates:   Allergen Reactions    Tylenol [acetaminophen] Other (See Comments)     Headache     Current Facility-Administered Medications   Medication Frequency    acetylcysteine 100 mg/ml (10%) solution 4 mL BID    albumin human 25% bottle 12.5 g PRN    albuterol-ipratropium 2.5 mg-0.5 mg/3 mL nebulizer solution 3 mL Q6H    ampicillin-sulbactam 3 g in sodium chloride 0.9 % 100 mL IVPB (ready to mix system) Q24H    bisacodyl suppository 10 mg Daily PRN    chlorhexidine 0.12 % solution 15 mL BID    dextrose 50% injection 12.5 g PRN    famotidine (PF) injection 20 mg Daily    glucagon (human recombinant) injection 1 mg PRN    heparin (porcine) injection 5,000 Units Q8H    insulin aspart U-100 pen 0-5 Units Q6H PRN    levETIRAcetam in NaCl (iso-os) IVPB 500 mg Q12H    [START ON 7/16/2018] levETIRAcetam in NaCl (iso-os) IVPB 500 mg Every Mon, Wed, Fri    polyethylene glycol packet 17 g Daily       Objective:     Vital Signs (Most Recent):  Temp: 98.9 °F (37.2 °C) (07/14/18 0700)  Pulse: 94 (07/14/18 1100)  Resp: 20 (07/14/18 1100)  BP: (!) 155/59 (07/14/18 1000)  SpO2: 100 % (07/14/18 1100)  O2 Device (Oxygen Therapy): ventilator (07/14/18 1100) Vital Signs (24h Range):  Temp:  [98 °F (36.7 °C)-98.9 °F (37.2 °C)] 98.9 °F (37.2 °C)  Pulse:  [] 94  Resp:  [15-29] 20  SpO2:  [100 %] 100 %  BP: ()/(37-96) 155/59     Weight: 52 kg (114 lb 10.2 oz) (07/14/18 0300)  Body mass index is 23.15 kg/m².  Body surface area is 1.47 meters squared.    I/O last 3 completed shifts:  In: 1360 [NG/GT:860; IV Piggyback:500]  Out: 4500 [Other:4500]    Physical Exam   Constitutional:   Ill appearing, frail   HENT:   ETT +    Eyes:   Pupils react some according to the neurology examination   Neck: Neck supple.   Cardiovascular:    Tachycardic   Pulmonary/Chest: Effort normal. No respiratory distress.   + ronchi bilaterally    Abdominal: Soft. There is no tenderness.   Musculoskeletal: She exhibits no edema.   Neurological:   Unresponsive , not moving extremities      Skin: Skin is warm.   Nursing note and vitals reviewed.      Significant Labs:  All labs within the past 24 hours have been reviewed.     Significant Imaging:  Labs: Reviewed

## 2018-07-14 NOTE — ASSESSMENT & PLAN NOTE
Hypoxic encephalopathy vs metabolic in setting sepsi  Continue antibotics   Neurology following

## 2018-07-14 NOTE — ASSESSMENT & PLAN NOTE
Patient had hemodialysis today.  Nephrology consult.  7/12 no need for HD today  7/13 HD as per Renal.  From respiratory standpoint O2 requirement acceptable no concern for fluid overload.  7/14 spoke with the nephrology will hold on dialysis.

## 2018-07-14 NOTE — PLAN OF CARE
Problem: Patient Care Overview  Goal: Plan of Care Review  Outcome: Ongoing (interventions implemented as appropriate)  Patient remains intubated, no changes to report at this time. NSR on the monitor. VSS. TMAX 98.6. Tube feedings continued, patient tolerating well. Auto rotate bed and bilateral heel floating devices maintained, upper extremities elevated. Dressings to bilateral heels clean, dry and intact. Dressing to left foot, 2nd toe clean dry and intact. Plan of care discussed with family, family conference to be held today at noon. Will continue to monitor.

## 2018-07-14 NOTE — PLAN OF CARE
Problem: Patient Care Overview  Goal: Plan of Care Review  Outcome: Ongoing (interventions implemented as appropriate)  Family meeting today regarding pt's condition and family's plans for pt's care.

## 2018-07-14 NOTE — PROGRESS NOTES
INPATIENT   NEURO CRITICAL CARE PROGRESS  NOTE    Talia Mcfarland   68 y.o. female  DATE 2018      Patient remains intubated and unresponsive to verbal commands. There is no improvement in her mental status. Patient is post cardiac arrest  EEG indicative of encephalopathy. However, was treated with keppra because of intermittent spikes.No improvement  She is afebrile. She has not been on sedation over 48 hours.               Ara Coma Scale (GCS):     Score   EYE OPENING   Spontaneous 4   Response to verbal command 3   Response to pain 2   No eye opening 1             VERBAL response   Oriented 5   Confused 4   Inappropriate words 3   Incomprehensible sounds 2   No verbal response 1       MOTOR response   Obeys commands 6   Localizing response to pain 5   Withdrawal response to pain 4   Flexion to pain 3   Extension to pain 2   No motor response 1   Total  5   The GCS is scored between 3 and 15, 3 being the worst and 15 the best. It is composed of three parameters: best eye response (E), best verbal response (V), and best motor response (M). The components of the GCS should be recorded individually; for example, [id number] results in a GCS score of 9. A score of 13 or higher correlates with mild brain injury, a score of 9 to 12 correlates with moderate injury, and a score of 8 or less represents severe brain injury.  Past Medical History:   Diagnosis Date    Anemia     CHF (congestive heart failure)     Diabetes mellitus     DVT (deep venous thrombosis)     Left leg    ESRD on hemodialysis 2 years     Gastroparesis     Hypertension     Type II or unspecified type diabetes mellitus with renal manifestations, not stated as uncontrolled(250.40) 30 years     Urinary tract infection due to extended-spectrum beta lactamase (ESBL)-producing Klebsiella      Past Surgical History:   Procedure Laterality Date     SECTION      CHOLECYSTECTOMY      KUSHAL Bilateral     LBP severe with multiple KUSHAL      ROTATOR CUFF REPAIR      UMBILICAL HERNIA REPAIR      VEIN BYPASS SURGERY Left      Family History   Problem Relation Age of Onset    Kidney disease Neg Hx      Social History   Substance Use Topics    Smoking status: Never Smoker    Smokeless tobacco: Never Used    Alcohol use No       Review of patient's allergies indicates:   Allergen Reactions    Tylenol [acetaminophen] Other (See Comments)     Headache        Scheduled Meds:   acetylcysteine 100 mg/ml (10%)  4 mL Nebulization BID    albuterol-ipratropium  3 mL Nebulization Q6H    ampicillin-sulbactim (UNASYN) IVPB  3 g Intravenous Q24H    chlorhexidine  15 mL Mouth/Throat BID    famotidine (PF)  20 mg Intravenous Daily    heparin (porcine)  5,000 Units Subcutaneous Q8H    levetiracetam IVPB  500 mg Intravenous BID    modafinil  200 mg Per OG tube Once    polyethylene glycol  17 g Oral Daily     Continuous Infusions:  PRN Meds:albumin human 25%, bisacodyl, dextrose 50%, glucagon (human recombinant), insulin aspart U-100      Review of Systems:  All the 14 ROS were reviewed and the pertinent ones are mentioned in the HPI           OBJECTIVE:     Vital Signs (Most Recent)  Temp: 98.9 °F (37.2 °C) (07/14/18 0700)  Pulse: 94 (07/14/18 1100)  Resp: 20 (07/14/18 1100)  BP: (!) 155/59 (07/14/18 1000)  SpO2: 100 % (07/14/18 1100)     Vital Signs Range (Last 24H):  Temp:  [98 °F (36.7 °C)-98.9 °F (37.2 °C)]   Pulse:  []   Resp:  [15-29]   BP: ()/(37-96)   SpO2:  [74 %-100 %]     Physical Exam:  General:  Intubated, unresponsive   HEENT:   Acephalic, Atraumatic,    Pupils isocoria, reactive, corneal reactive  Lungs: CTA,  No rhonchi, no rales  Heart: Regular Rate and rhythm, no murmurs,   Abdomen, Soft, non tender, non distended,  bowel sounds present  Extremities: No edema,    Skin: No rash, no ecchymoses,         NEURO    Mental Status:   Unresponsive to verbal commands and trace movement  SPEECH:   No aphasia, no Dysarthria,     CRANIAL  NERVES:  Pupils isocoria reactive  Corneal reactive  No significant response to facial pain  No facial asymmetry  Does not respond to sound  GAG and Cough trace       MOTOR:  Upper Extremities; flaccid tone   no movement      Lower Extremities: No spontaneous movement  Withdraws to pain or touch- reflex    TONE: flaccid    REFLEXES: 0 through out        Sensory: trace movement in the LE  R>L  EXTRAPYRAMIDALS: none    Laboratory:  Lab Results   Component Value Date    WBC 16.31 (H) 07/14/2018    HGB 10.1 (L) 07/14/2018    HCT 32.7 (L) 07/14/2018     (L) 07/14/2018    CHOL 176 12/23/2016    TRIG 85 12/23/2016    HDL 50 12/23/2016    ALT 19 07/14/2018    AST 19 07/14/2018     07/14/2018    K 4.2 07/14/2018    CL 99 07/14/2018    CREATININE 3.9 (H) 07/14/2018    BUN 47 (H) 07/14/2018    CO2 26 07/14/2018    TSH 1.353 07/11/2018    INR 1.1 12/22/2016    HGBA1C 5.6 07/11/2018        Diagnostic Results:CT scan films   ( All images reviewed Independently)   Imaging Results          CTA Chest Non Coronary (Final result)  Result time 07/11/18 17:46:11    Final result by Adolfo Douglas Jr., MD (07/11/18 17:46:11)                 Impression:      No pulmonary embolus.    Multilobar pneumonia.    Enlargement of the pulmonary arteries consistent with pulmonary artery hypertension.    Marked cardiomegaly.    All CT scan at this facility use dose modulation, iterative reconstruction, and/or weight base dosing when appropriate to reduce radiation dose to as low as reasonably achievable.      Electronically signed by: Adolfo Douglas Jr., MD  Date:    07/11/2018  Time:    17:46             Narrative:    EXAMINATION:  CTA CHEST NON CORONARY    CLINICAL HISTORY:  Chest pain, acute, PE suspected, high pretest prob;    TECHNIQUE:  Axial CTA images performed through the chest after the administration of 100 cc intravenous contrast. Maximum intensity projections were performed and interpreted.    FINDINGS:  There is no  evidence of pulmonary embolus. Pulmonary arteries are enlarged with the main pulmonary artery measuring 4.5 cm.  The heart is markedly enlarged with dense atherosclerotic calcifications of the coronary arteries.  The great vessels are patent.  Endotracheal tube in satisfactory position.  No mediastinal adenopathy.  Probable hemodialysis graft partially visualized the left upper extremity.    Extensive areas of consolidation involving the left upper and lower lobes.  There is also consolidation in the medial right lower lobe and patchy areas of ground-glass opacification within the right middle lobe.  Visualized abdominal organs are unremarkable.  Osseous structures are intact.  No effusion or pneumothorax.                               CT Head Without Contrast (Final result)  Result time 07/11/18 17:19:21    Final result by Adolfo Douglas Jr., MD (07/11/18 17:19:21)                 Impression:      1. No acute intracranial findings.  Stable exam.  All CT scans at this facility are performed  using dose modulation techniques as appropriate to performed exam including the following:  automated exposure control; adjustment of mA and/or kV according to the patients size (this includes techniques or standardized protocols for targeted exams where dose is matched to indication/reason for exam: i.e. extremities or head);  iterative reconstruction technique.      Electronically signed by: Adolfo Douglas Jr., MD  Date:    07/11/2018  Time:    17:19             Narrative:    EXAMINATION:  CT HEAD WITHOUT CONTRAST    CLINICAL HISTORY:  Decreased alertness;    TECHNIQUE:  CT scan was obtained of the head without administration of contrast.    COMPARISON:  07/11/2018.    FINDINGS:  Stable atrophy with periventricular white matter changes.  No extra-axial acute fluid collection.Ventricles and basal cisterns are normal.  No hemorrhage, mass effect or midline shift.  No other cerebral or cerebellar parenchymal abnormality.   Paranasal sinuses are clear.  Mastoid air cells are clear.  Calvarium is intact.                               X-Ray Chest AP Portable (Final result)  Result time 07/11/18 15:17:22    Final result by Vamshi Lynne MD (07/11/18 15:17:22)                 Impression:      As above.      Electronically signed by: Vamshi Lynne MD  Date:    07/11/2018  Time:    15:17             Narrative:    EXAMINATION:  XR CHEST AP PORTABLE    CLINICAL HISTORY:  post intubation; respiratory distress    TECHNIQUE:  AP view of the chest was performed.    COMPARISON:  Earlier today.    FINDINGS:  Endotracheal tube in satisfactory position.  Worsening left upper lobe pneumonia.                               X-Ray Foot Complete Left (Final result)  Result time 07/11/18 13:18:19    Final result by Vamshi Lynne MD (07/11/18 13:18:19)                 Impression:      Charcot arthropathy.  Superimposed osteomyelitis is not excluded.  Consider MRI correlation.      Electronically signed by: Vamshi Lynne MD  Date:    07/11/2018  Time:    13:18             Narrative:    EXAMINATION:  XR FOOT COMPLETE 3 VIEW LEFT    CLINICAL HISTORY:  Osteomyelitis, unspecified.   Pain.    TECHNIQUE:  3 views.    COMPARISON:  None    FINDINGS:  Severe osteopenia.  Extensive vascular calcifications.  Plantar subluxation of the midfoot.  Sclerosis and partial collapse of the middle cuneiform.  Extensive erosive changes throughout the midfoot.  Diffuse soft tissue swelling.  Prominent plantar spur.                               X-Ray Chest AP Portable (Final result)  Result time 07/11/18 13:15:44    Final result by Vamshi Lynne MD (07/11/18 13:15:44)                 Impression:      Left upper lobe pneumonia.  Recommend close follow-up after treatment.      Electronically signed by: Vamshi Lynne MD  Date:    07/11/2018  Time:    13:15             Narrative:    EXAMINATION:  XR CHEST AP PORTABLE    CLINICAL HISTORY:  cough;    TECHNIQUE:  AP view of the chest was  performed.    COMPARISON:  October 2, 2017    FINDINGS:  Left upper lobe consolidation.    Stable cardiomegaly.  Aortic atherosclerosis.  No acute osseous findings demonstrated.                               US Lower Extremity Veins Bilateral (Final result)  Result time 07/11/18 13:15:04    Final result by Vamshi Lynne MD (07/11/18 13:15:04)                 Impression:      Negative for bilateral lower extremity DVT.  Limited visualization of the left calf veins.      Electronically signed by: Vamshi Lynne MD  Date:    07/11/2018  Time:    13:15             Narrative:    EXAMINATION:  US LOWER EXTREMITY VEINS BILATERAL    CLINICAL HISTORY:  Edema, unspecified    FINDINGS:  Grayscale and color Doppler sonography was formed through the bilateral lower extremities.    The visualized bilateral lower extremity veins are widely patent with normal augmentation, and compressibility and respiratory variability.  The left leg and calf veins were not visualized secondary to use marked soft tissue edema and skin thickening.  No evidence of fluid collection.                               CT Head Without Contrast (Final result)  Result time 07/11/18 12:18:25    Final result by Benedict Henderson MD (07/11/18 12:18:25)                 Impression:      Chronic microvascular ischemic changes.      Electronically signed by: Benedict Henderson MD  Date:    07/11/2018  Time:    12:18             Narrative:    EXAMINATION:  CT HEAD WITHOUT CONTRAST    CLINICAL HISTORY:  ams;    TECHNIQUE:  Low dose axial CT images obtained throughout the head without intravenous contrast. Sagittal and coronal reconstructions were performed.  All CT scans at this facility use dose modulation, iterative reconstruction and/or weight based dosing when appropriate to reduce radiation dose to as low as reasonably achievable.    COMPARISON:  03/14/2016    FINDINGS:  Intracranial compartment:    The brain parenchyma demonstrates areas of decreased attenuation with mild  to moderate periventricular white matter consistent with chronic microvascular ischemic changes..  No parenchymal mass, hemorrhage, edema or major vascular distribution infarct.  Vascular calcifications are noted.    Mild prominence of the sulci and ventricles are consistent with age-related involutional changes.    No extra-axial blood or fluid collections.    Skull/extracranial contents (limited evaluation): No fracture. Mastoid air cells and paranasal sinuses are essentially clear.                                07/14/2018    Assessment and Recommendations:  Patient with multiple medical problems, she is afebrile, no seizures, but has abnormal EEG, mostly encephalopathic  She had cardiac arrest. Now intubated no sedation, no higher cortical functions.   Brain stem reflexes are minimal.     Differential diagnosis:  1) Hypoxic Brain injury  2) Metabolic /Toxic Encephalopathy    Plan: medical management                          Nirav Abbasi MD., Ph.D., MS

## 2018-07-14 NOTE — PROGRESS NOTES
Family Meeting:  Family members, ICU attending, Yin LOZANO    Patient's sons, daughters, , mother, other family members were present in the room  Discussed about the prognosis  Discussed about Hypoxic brain injury  Discussed about EEG  Discussed about Encephalopathy  Discussed about DNR/DNI    12:25- 12;50 PM    Meeting ended with no dispute

## 2018-07-14 NOTE — ASSESSMENT & PLAN NOTE
From pneumonia and Acinetobacter bacteremia -Will follow final drug susceptibility of organism.  Will use Unasyn.  Will also follow final drug susceptibility of E coli

## 2018-07-14 NOTE — SUBJECTIVE & OBJECTIVE
Review of Systems   Unable to perform ROS: Intubated     Objective:     Vital Signs (Most Recent):  Temp: 98.1 °F (36.7 °C) (07/14/18 1100)  Pulse: 93 (07/14/18 1352)  Resp: 19 (07/14/18 1352)  BP: (!) 134/51 (07/14/18 1300)  SpO2: 100 % (07/14/18 1352) Vital Signs (24h Range):  Temp:  [98.1 °F (36.7 °C)-98.9 °F (37.2 °C)] 98.1 °F (36.7 °C)  Pulse:  [] 93  Resp:  [15-29] 19  SpO2:  [100 %] 100 %  BP: (103-160)/(37-96) 134/51     Weight: 52 kg (114 lb 10.2 oz)  Body mass index is 23.15 kg/m².    Intake/Output Summary (Last 24 hours) at 07/14/18 1443  Last data filed at 07/14/18 1300   Gross per 24 hour   Intake              870 ml   Output             2000 ml   Net            -1130 ml      Physical Exam   Constitutional:   Ill appearing, frail   HENT:   ETT +    Eyes:   Pupils react some according to the neurology examination   Neck: Neck supple.   Cardiovascular:   Tachycardic   Pulmonary/Chest: Effort normal. No respiratory distress.   + ronchi bilaterally    Abdominal: Soft. There is no tenderness.   Musculoskeletal: She exhibits no edema.   Neurological:   Unresponsive , not moving extremities      Skin: Skin is warm.   Nursing note and vitals reviewed.      Significant Labs: All pertinent labs within the past 24 hours have been reviewed.    Significant Imaging: I have reviewed all pertinent imaging results/findings within the past 24 hours.

## 2018-07-14 NOTE — ASSESSMENT & PLAN NOTE
O2/mechanical ventilation/pulmonary toilet  Daily ABG and chest x-ray  After post intubation ABG, tidal volume adjust to 350, FiO2 70%, keep respiratory rate at 22, peep 5 cm water.  7/12O2/mechanical ventilation/pulmonary toilet , Daily ABG and chest x-ray. Decrease RR to 16   7/13 decrease FiO2 to 35%.  7/14 no change in med settings

## 2018-07-14 NOTE — ASSESSMENT & PLAN NOTE
In setting of PEA cardiac arrest  Trend enzymes  2D ECHO without WMA    No further workup until neuro status improves

## 2018-07-14 NOTE — ASSESSMENT & PLAN NOTE
-nephrology on board   - case discussed with dr gabriel   7/12  Nephrology on board currently getting dialysis   7/14  On dialysis

## 2018-07-14 NOTE — PROGRESS NOTES
[facility] Hospital Medicine  Progress Note    Patient Name: Talia Mcfarland  MRN: [id number]  Patient Class: IP- Inpatient   Admission Date: 7/11/2018  Length of Stay: 3 days  Attending Physician: Zayda England MD  Primary Care Provider: Tank Castro MD        Subjective:     Principal Problem:HAP (hospital-acquired pneumonia)    HPI:  Talia Mcfarland is a 66 y.o. female patient with PMHx of ESRD on HD, DM2, HTN, PVD, who presented to the ER from dialysis center with alert mental status . At dialysis center patient was receiving  First dose of vancomycin  For possible infection . She was confused so was transferred to ed . History obtained from electronic records no family bedside . 13 30 pm patient was seen by ed physician with confusion but awake . Later around 14 25 she was found not responsive bradycardiac and show breathing weak pulse was moved to ed room 4 from room 14 . Was intubated and after that was found to have no pulse(pea) cpr was started with two rounds , two epi,one atropine / calcium and bicarb  . After ROSc ekg done was with no acute changes .  . Not following commands but randomly moving extremities and opening eyes . Stat echo was done which showed right heart strain with possibility of PE . So cta was ordered . Also started on broad spectrum antibotic culture drawn     Hospital Course:  7/12  Patient seen and examined today .she is only fentanyl drip . no improvement in mental status after cardiac arrest . Gram negative bactermia . Continue to be ventilator   7/13  Patient has ACINETOBACTER SPECIES in blood and e coli in urine .infectious disease was informed recommended Unasyn and vancomycin as per pharmacy  . Neurology consulted for mental status after cardiac arrest   7/14  Patient was seen and examined today. Discuss plan of care with family . Minimal brain stem reflexes . No significant improvement after cardiac arrest -hypoxic brain injury . Started  ciprofloxacin  For e coli.         Review of Systems   Unable to perform ROS: Intubated     Objective:     Vital Signs (Most Recent):  Temp: 98.1 °F (36.7 °C) (07/14/18 1100)  Pulse: 93 (07/14/18 1352)  Resp: 19 (07/14/18 1352)  BP: (!) 134/51 (07/14/18 1300)  SpO2: 100 % (07/14/18 1352) Vital Signs (24h Range):  Temp:  [98.1 °F (36.7 °C)-98.9 °F (37.2 °C)] 98.1 °F (36.7 °C)  Pulse:  [] 93  Resp:  [15-29] 19  SpO2:  [100 %] 100 %  BP: (103-160)/(37-96) 134/51     Weight: 52 kg (114 lb 10.2 oz)  Body mass index is 23.15 kg/m².    Intake/Output Summary (Last 24 hours) at 07/14/18 1443  Last data filed at 07/14/18 1300   Gross per 24 hour   Intake              870 ml   Output             2000 ml   Net            -1130 ml      Physical Exam   Constitutional:   Ill appearing, frail   HENT:   ETT +    Eyes:   Pupils react some according to the neurology examination   Neck: Neck supple.   Cardiovascular:   Tachycardic   Pulmonary/Chest: Effort normal. No respiratory distress.   + ronchi bilaterally    Abdominal: Soft. There is no tenderness.   Musculoskeletal: She exhibits no edema.   Neurological:   Unresponsive , not moving extremities      Skin: Skin is warm.   Nursing note and vitals reviewed.      Significant Labs: All pertinent labs within the past 24 hours have been reviewed.    Significant Imaging: I have reviewed all pertinent imaging results/findings within the past 24 hours.    Assessment/Plan:      * HAP (hospital-acquired pneumonia)    -dialysis patient with multilobar pneumonia  - continue with broad spectrum antibotics   -follow up on cultures   - continue with ventilator support   7/12  Continue zosyn and vanco   Gram negative rods bactermia   7/13  Continue with unasyn         Encephalopathy    Hypoxic encephalopathy vs metabolic in setting sepsi  Continue antibotics   Neurology following           Acute cystitis    Continue with ciprofloxcin   Urine culture e coli           Deep tissue injury               DM type 2 (diabetes mellitus, type 2)    Continue with iss   And follow hba1c           Severe sepsis    - multilobar pneumonia on broad spectrum antibotics   - also possible oste in toe will order mri once patient stable  - continue with antibotics and follow up on cultures   7/12  Continue with zosyn and vancomycin  Blood culture gram negative rods         Acute respiratory failure    -acute hyper capneic and hypoxic respiratory failure   - in setting of multilobar pneumonia   - continue with broad spectrum antibotics and follow up on cultures   - pe rule out with cta          Cardiac arrest    -initial rhythm as per ed notes PEA  - two rounds of cpr with rosc   - echo with right heart strain and elevated PA and increase central vein pressure pe was rule out cta negative   - respiratory arrest in setting of pneumonia  7/12  No improvement in mental status   Will continue with vent support   7/13  No improvement in mental status neurology on board         Elevated troponin    - stress induced in setting of the sepsis           ESRD on hemodialysis    -nephrology on board   - case discussed with dr gabriel   7/12  Nephrology on board currently getting dialysis   7/14  On dialysis         Type II or unspecified type diabetes mellitus with renal manifestations, not stated as uncontrolled(250.40)                VTE Risk Mitigation         Ordered     heparin (porcine) injection 5,000 Units  Every 8 hours      07/11/18 1811          Critical care time spent on the evaluation and treatment of severe organ dysfunction, review of pertinent labs and imaging studies, discussions with consulting providers and discussions with patient/family: 40 minutes.    Zayda England MD  Department of Hospital Medicine   [facility]

## 2018-07-14 NOTE — SUBJECTIVE & OBJECTIVE
Interval History: no acute events, no neuro improvement     Review of Systems   Unable to perform ROS: intubated     Objective:     Vital Signs (Most Recent):  Temp: 98.1 °F (36.7 °C) (07/14/18 1100)  Pulse: 93 (07/14/18 1352)  Resp: 19 (07/14/18 1352)  BP: (!) 134/51 (07/14/18 1300)  SpO2: 100 % (07/14/18 1352) Vital Signs (24h Range):  Temp:  [98.1 °F (36.7 °C)-98.9 °F (37.2 °C)] 98.1 °F (36.7 °C)  Pulse:  [] 93  Resp:  [15-29] 19  SpO2:  [100 %] 100 %  BP: (107-160)/(37-96) 134/51     Weight: 52 kg (114 lb 10.2 oz)  Body mass index is 23.15 kg/m².     SpO2: 100 %  O2 Device (Oxygen Therapy): ventilator      Intake/Output Summary (Last 24 hours) at 07/14/18 1517  Last data filed at 07/14/18 1300   Gross per 24 hour   Intake              845 ml   Output                0 ml   Net              845 ml       Lines/Drains/Airways     Drain                 Hemodialysis AV Fistula Left upper arm -- days         NG/OG Tube 07/11/18 1811 orogastric 18 Fr. Left mouth 2 days          Airway                 Airway - Non-Surgical 07/11/18 1426 Endotracheal Tube 3 days          Pressure Ulcer                 Pressure Injury 07/12/18 Left Heel Deep tissue injury 2 days         Pressure Injury 07/12/18 Right Heel Deep tissue injury 2 days          Peripheral Intravenous Line                 Peripheral IV - Single Lumen 07/11/18 1240 Right Forearm 3 days         Peripheral IV - Single Lumen 07/11/18 1438 Right  3 days                Physical Exam   Constitutional: She is oriented to person, place, and time. She appears well-developed and well-nourished. No distress.   HENT:   Head: Normocephalic and atraumatic.   Nose: Nose normal.   Mouth/Throat: Oropharynx is clear and moist.   Eyes: Conjunctivae and EOM are normal. No scleral icterus.   Neck: Normal range of motion. Neck supple. No JVD present. No thyromegaly present.   Cardiovascular: Normal rate, regular rhythm, S1 normal and S2 normal.  Exam reveals no gallop, no S3, no  S4 and no friction rub.    Murmur heard.  Pulmonary/Chest: Effort normal and breath sounds normal. No stridor. No respiratory distress. She has no wheezes. She has no rales. She exhibits no tenderness.   Abdominal: Soft. Bowel sounds are normal. She exhibits no distension and no mass. There is no tenderness. There is no rebound.   Genitourinary:   Genitourinary Comments: Deferred   Musculoskeletal: Normal range of motion. She exhibits no edema, tenderness or deformity.   Lymphadenopathy:     She has no cervical adenopathy.   Neurological: She is alert and oriented to person, place, and time. She exhibits normal muscle tone. Coordination normal.   Skin: Skin is warm and dry. No rash noted. She is not diaphoretic. No erythema. No pallor.   Psychiatric: She has a normal mood and affect. Her behavior is normal. Judgment and thought content normal.   Nursing note and vitals reviewed.      Significant Labs:   All pertinent lab results from the last 24 hours have been reviewed. and   Recent Lab Results       07/14/18  1229 07/14/18  0518 07/14/18  0514 07/14/18  0405 07/14/18  0056      Albumin    2.4(L)      Alkaline Phosphatase    111      Allens Test  Pass        ALT    19      Anion Gap    15      AST    19      Baso #    0.01      Basophil%    0.1      Total Bilirubin    0.9  Comment:  For infants and newborns, interpretation of results should be based  on gestational age, weight and in agreement with clinical  observations.  Premature Infant recommended reference ranges:  Up to 24 hours.............<8.0 mg/dL  Up to 48 hours............<12.0 mg/dL  3-5 days..................<15.0 mg/dL  6-29 days.................<15.0 mg/dL        Site  RR        BUN, Bld    47(H)      Calcium    10.0      Chloride    99      CO2    26      Creatinine    3.9(H)      DelSys  Adult Vent        Differential Method    Automated      eGFR if     13(A)      eGFR if non     11  Comment:  Calculation used to  obtain the estimated glomerular filtration  rate (eGFR) is the CKD-EPI equation.   (A)      Eos #    0.1      Eosinophil%    0.7      FiO2  35        Glucose    136(H)      Gran # (ANC)    14.1(H)      Gran%    88.1(H)      Hematocrit    32.7(L)      Hemoglobin    10.1(L)      Lymph #    1.4      Lymph%    8.5(L)      Magnesium    2.2      MCH    24.6(L)      MCHC    30.9(L)      MCV    80(L)      Mode  AC/PRVC        Mono #    0.7      Mono%    4.4      MPV    SEE COMMENT  Comment:  Result not available.      PEEP  8        Phosphorus    3.5      Platelets    108(L)      POC BE  6        POC HCO3  29.8(H)        POC PCO2  44.4        POC PH  7.436        POC PO2  80        POC SATURATED O2  96        POCT Glucose 162(H)  141(H)  145(H)     Potassium    4.2      Total Protein    6.5      Rate  16        RBC    4.10      RDW    19.7(H)      Sample  ARTERIAL        Sodium    140      Vt  350        WBC    16.31(H)                  07/13/18  1808      Albumin      Alkaline Phosphatase      Allens Test      ALT      Anion Gap      AST      Baso #      Basophil%      Total Bilirubin      Site      BUN, Bld      Calcium      Chloride      CO2      Creatinine      DelSys      Differential Method      eGFR if       eGFR if non       Eos #      Eosinophil%      FiO2      Glucose      Gran # (ANC)      Gran%      Hematocrit      Hemoglobin      Lymph #      Lymph%      Magnesium      MCH      MCHC      MCV      Mode      Mono #      Mono%      MPV      PEEP      Phosphorus      Platelets      POC BE      POC HCO3      POC PCO2      POC PH      POC PO2      POC SATURATED O2      POCT Glucose 159(H)     Potassium      Total Protein      Rate      RBC      RDW      Sample      Sodium      Vt      WBC            Significant Imaging: Echocardiogram:   2D echo with color flow doppler:   Results for orders placed or performed during the hospital encounter of 07/11/18   2D echo with color flow doppler    Result Value Ref Range    EF 60 55 - 65    Aortic Valve Stenosis MODERATE (A)     Est. PA Systolic Pressure 59.09 (A)     Mitral Valve Mobility MILDLY RESTRICTED     Tricuspid Valve Regurgitation MILD TO MODERATE     and X-Ray: CXR: X-Ray Chest 1 View (CXR):   Results for orders placed or performed during the hospital encounter of 07/11/18   X-Ray Chest 1 View    Narrative    EXAMINATION:  XR CHEST 1 VIEW    CLINICAL HISTORY:  PNA;    TECHNIQUE:  Single frontal view of the chest was performed.    COMPARISON:  07/13/2018    FINDINGS:  Endotracheal tube present at the T4-T5 level.  NG tube courses through the films.  Cardiomegaly.  Atherosclerotic thoracic aorta.  Patchy airspace disease in the left upper lung zone somewhat improved compared to 07/13/2018.  Air bronchograms at the left lung base.      Impression    Tubes in good position.  Probable improving airspace disease in the left upper lung zone, air bronchograms identified in the retrocardiac region      Electronically signed by: Benedict Reich MD  Date:    07/14/2018  Time:    08:34

## 2018-07-14 NOTE — PROGRESS NOTES
[facility]  Infectious Disease  Progress Note    Patient Name: Talia Mcfarland  MRN: [id number]  Admission Date: 7/11/2018  Length of Stay: 2 days  Attending Physician: Zayda England MD  Primary Care Provider: Tank Castro MD    Isolation Status: No active isolations  Assessment/Plan:      DM type 2 (diabetes mellitus, type 2)    Insulin sliding scale        Severe sepsis    From pneumonia and Acinetobacter bacteremia -Will follow final drug susceptibility of organism.  Will use Unasyn.  Will also follow final drug susceptibility of E coli         Acute respiratory failure    Continue ventilatory support .  Critical care follow up    07/13- will wean off ventilatory support if feasible        ESRD on hemodialysis    Continue HD as per nephrology             Anticipated Disposition:     Thank you for your consult. I will follow-up with patient. Please contact us if you have any additional questions.    Adolfo Ulloa MD  Infectious Disease  [facility]    Subjective:     Principal Problem:HAP (hospital-acquired pneumonia)    HPI: 66 year old  female with history of ESRD on HD, DM2, HTN, PVD, who presented to the ER from dialysis center with alert mental status .  Hospital course was complicated by PEA  Cardiac arrest .She is presently intubated and blood cultures are now positive for GNR.  CTA of the chest showed multilobar pneumonia.    Interval History: she remains intubated .  Blood culture-Acinetobacter ,urine culture -E coli    Review of Systems   Unable to perform ROS: Intubated     Objective:     Vital Signs (Most Recent):  Temp: 98.4 °F (36.9 °C) (07/13/18 1500)  Pulse: 96 (07/13/18 2113)  Resp: 16 (07/13/18 2113)  BP: (!) 119/48 (07/13/18 1900)  SpO2: 100 % (07/13/18 2113) Vital Signs (24h Range):  Temp:  [98 °F (36.7 °C)-99.7 °F (37.6 °C)] 98.4 °F (36.9 °C)  Pulse:  [] 96  Resp:  [14-29] 16  SpO2:  [74 %-100 %] 100 %  BP: ()/(35-72) 119/48     Weight: 54.2  kg (119 lb 7.8 oz)  Body mass index is 24.13 kg/m².    Estimated Creatinine Clearance: 14.9 mL/min (A) (based on SCr of 3.1 mg/dL (H)).    Physical Exam   Constitutional:   Ill appearing, frail   HENT:   ETT +    Eyes:   Sluggish pupillary reaction to light   Neck: Neck supple.   Cardiovascular:   Tachy at 87    Pulmonary/Chest: Effort normal. No respiratory distress.   + ronchi bilaterally    Abdominal: Soft. There is no tenderness.   Musculoskeletal: She exhibits no edema.   Neurological:   Unresponsive , not moving extremities    Skin: Skin is warm.   Nursing note and vitals reviewed.      Significant Labs:   Blood Culture:   Recent Labs  Lab 07/11/18  1220 07/11/18  1240   LABBLOO Gram stain aer bottle: Gram negative rods   Results called to and read back by: Robert Staples RN 07/12/2018  06:20  ACINETOBACTER SPECIESOrganism further identified as Acinetobacter pittiiFor susceptibility see order #[id number] Gram stain aer bottle: Gram negative rods   Results called to and read back by: Robert Staples RN 07/12/2018  06:20  ACINETOBACTER SPECIESOrganism further identified as Acinetobacter pittiiSusceptibility pending     CBC:   Recent Labs  Lab 07/12/18  0350 07/13/18  0303   WBC 7.14 13.43*   HGB 10.9* 10.3*   HCT 36.5* 34.1*   * 121*     All pertinent labs within the past 24 hours have been reviewed.    Significant Imaging: I have reviewed all pertinent imaging results/findings within the past 24 hours.

## 2018-07-15 PROBLEM — Z91.199 HX OF NONCOMPLIANCE WITH MEDICAL TREATMENT, PRESENTING HAZARDS TO HEALTH: Status: ACTIVE | Noted: 2018-07-15

## 2018-07-15 PROBLEM — J96.01 ACUTE RESPIRATORY FAILURE WITH HYPOXIA: Status: ACTIVE | Noted: 2018-07-11

## 2018-07-15 PROBLEM — Z91.199 MEDICALLY NONCOMPLIANT: Chronic | Status: ACTIVE | Noted: 2018-07-15

## 2018-07-15 PROBLEM — A41.59: Status: ACTIVE | Noted: 2018-07-15

## 2018-07-15 PROBLEM — B96.20 E. COLI URINARY TRACT INFECTION: Status: ACTIVE | Noted: 2018-07-14

## 2018-07-15 PROBLEM — N39.0 E. COLI URINARY TRACT INFECTION: Status: ACTIVE | Noted: 2018-07-14

## 2018-07-15 PROBLEM — E11.65 TYPE 2 DIABETES MELLITUS WITH HYPERGLYCEMIA, WITHOUT LONG-TERM CURRENT USE OF INSULIN: Status: ACTIVE | Noted: 2018-07-11

## 2018-07-15 LAB
ALBUMIN SERPL BCP-MCNC: 2.3 G/DL
ALLENS TEST: ABNORMAL
ALP SERPL-CCNC: 145 U/L
ALT SERPL W/O P-5'-P-CCNC: 16 U/L
ANION GAP SERPL CALC-SCNC: 15 MMOL/L
ANISOCYTOSIS BLD QL SMEAR: ABNORMAL
AST SERPL-CCNC: 16 U/L
BACTERIA BLD CULT: NORMAL
BASOPHILS # BLD AUTO: 0.03 K/UL
BASOPHILS NFR BLD: 0.2 %
BILIRUB SERPL-MCNC: 0.8 MG/DL
BUN SERPL-MCNC: 68 MG/DL
CALCIUM SERPL-MCNC: 9.8 MG/DL
CHLORIDE SERPL-SCNC: 99 MMOL/L
CO2 SERPL-SCNC: 25 MMOL/L
CREAT SERPL-MCNC: 4.6 MG/DL
DACRYOCYTES BLD QL SMEAR: ABNORMAL
DELSYS: ABNORMAL
DIFFERENTIAL METHOD: ABNORMAL
EOSINOPHIL # BLD AUTO: 0.3 K/UL
EOSINOPHIL NFR BLD: 2 %
ERYTHROCYTE [DISTWIDTH] IN BLOOD BY AUTOMATED COUNT: 19.9 %
ERYTHROCYTE [SEDIMENTATION RATE] IN BLOOD BY WESTERGREN METHOD: 16 MM/H
EST. GFR  (AFRICAN AMERICAN): 11 ML/MIN/1.73 M^2
EST. GFR  (NON AFRICAN AMERICAN): 9 ML/MIN/1.73 M^2
FIO2: 35
GLUCOSE SERPL-MCNC: 154 MG/DL
HCO3 UR-SCNC: 33.1 MMOL/L (ref 24–28)
HCT VFR BLD AUTO: 33 %
HGB BLD-MCNC: 9.9 G/DL
HYPOCHROMIA BLD QL SMEAR: ABNORMAL
LYMPHOCYTES # BLD AUTO: 1.4 K/UL
LYMPHOCYTES NFR BLD: 8.7 %
MAGNESIUM SERPL-MCNC: 2.4 MG/DL
MCH RBC QN AUTO: 24.1 PG
MCHC RBC AUTO-ENTMCNC: 30 G/DL
MCV RBC AUTO: 80 FL
MODE: ABNORMAL
MONOCYTES # BLD AUTO: 1.2 K/UL
MONOCYTES NFR BLD: 7.6 %
NEUTROPHILS # BLD AUTO: 12.9 K/UL
NEUTROPHILS NFR BLD: 81.9 %
OVALOCYTES BLD QL SMEAR: ABNORMAL
PCO2 BLDA: 47.6 MMHG (ref 35–45)
PEEP: 8
PH SMN: 7.45 [PH] (ref 7.35–7.45)
PHOSPHATE SERPL-MCNC: 3.6 MG/DL
PLATELET # BLD AUTO: 89 K/UL
PLATELET BLD QL SMEAR: ABNORMAL
PMV BLD AUTO: ABNORMAL FL
PO2 BLDA: 94 MMHG (ref 80–100)
POC BE: 9 MMOL/L
POC SATURATED O2: 98 % (ref 95–100)
POCT GLUCOSE: 161 MG/DL (ref 70–110)
POCT GLUCOSE: 166 MG/DL (ref 70–110)
POCT GLUCOSE: 172 MG/DL (ref 70–110)
POIKILOCYTOSIS BLD QL SMEAR: SLIGHT
POLYCHROMASIA BLD QL SMEAR: ABNORMAL
POTASSIUM SERPL-SCNC: 4 MMOL/L
PROT SERPL-MCNC: 6.5 G/DL
RBC # BLD AUTO: 4.11 M/UL
SAMPLE: ABNORMAL
SCHISTOCYTES BLD QL SMEAR: PRESENT
SITE: ABNORMAL
SODIUM SERPL-SCNC: 139 MMOL/L
SPHEROCYTES BLD QL SMEAR: ABNORMAL
STOMATOCYTES BLD QL SMEAR: PRESENT
TARGETS BLD QL SMEAR: ABNORMAL
VT: 350
WBC # BLD AUTO: 15.84 K/UL

## 2018-07-15 PROCEDURE — 99291 CRITICAL CARE FIRST HOUR: CPT | Mod: ,,, | Performed by: INTERNAL MEDICINE

## 2018-07-15 PROCEDURE — 25000003 PHARM REV CODE 250: Performed by: INTERNAL MEDICINE

## 2018-07-15 PROCEDURE — 82803 BLOOD GASES ANY COMBINATION: CPT

## 2018-07-15 PROCEDURE — 80053 COMPREHEN METABOLIC PANEL: CPT

## 2018-07-15 PROCEDURE — 36415 COLL VENOUS BLD VENIPUNCTURE: CPT

## 2018-07-15 PROCEDURE — 93010 ELECTROCARDIOGRAM REPORT: CPT | Mod: ,,, | Performed by: INTERNAL MEDICINE

## 2018-07-15 PROCEDURE — 84100 ASSAY OF PHOSPHORUS: CPT

## 2018-07-15 PROCEDURE — 99900035 HC TECH TIME PER 15 MIN (STAT)

## 2018-07-15 PROCEDURE — 99291 CRITICAL CARE FIRST HOUR: CPT | Mod: ,,, | Performed by: NURSE PRACTITIONER

## 2018-07-15 PROCEDURE — 63600175 PHARM REV CODE 636 W HCPCS: Performed by: INTERNAL MEDICINE

## 2018-07-15 PROCEDURE — S0028 INJECTION, FAMOTIDINE, 20 MG: HCPCS | Performed by: INTERNAL MEDICINE

## 2018-07-15 PROCEDURE — 94668 MNPJ CHEST WALL SBSQ: CPT

## 2018-07-15 PROCEDURE — 94640 AIRWAY INHALATION TREATMENT: CPT

## 2018-07-15 PROCEDURE — 25000242 PHARM REV CODE 250 ALT 637 W/ HCPCS: Performed by: NURSE PRACTITIONER

## 2018-07-15 PROCEDURE — 20000000 HC ICU ROOM

## 2018-07-15 PROCEDURE — 94003 VENT MGMT INPAT SUBQ DAY: CPT

## 2018-07-15 PROCEDURE — 93005 ELECTROCARDIOGRAM TRACING: CPT

## 2018-07-15 PROCEDURE — 36600 WITHDRAWAL OF ARTERIAL BLOOD: CPT

## 2018-07-15 PROCEDURE — 63600175 PHARM REV CODE 636 W HCPCS: Performed by: PSYCHIATRY & NEUROLOGY

## 2018-07-15 PROCEDURE — 85025 COMPLETE CBC W/AUTO DIFF WBC: CPT

## 2018-07-15 PROCEDURE — 99900026 HC AIRWAY MAINTENANCE (STAT)

## 2018-07-15 PROCEDURE — 83735 ASSAY OF MAGNESIUM: CPT

## 2018-07-15 RX ORDER — CHLORPROMAZINE HYDROCHLORIDE 10 MG/1
10 TABLET, FILM COATED ORAL ONCE
Status: DISCONTINUED | OUTPATIENT
Start: 2018-07-15 | End: 2018-07-15 | Stop reason: RX

## 2018-07-15 RX ADMIN — HEPARIN SODIUM 5000 UNITS: 5000 INJECTION, SOLUTION INTRAVENOUS; SUBCUTANEOUS at 05:07

## 2018-07-15 RX ADMIN — IPRATROPIUM BROMIDE AND ALBUTEROL SULFATE 3 ML: .5; 3 SOLUTION RESPIRATORY (INHALATION) at 07:07

## 2018-07-15 RX ADMIN — CIPROFLOXACIN 400 MG: 2 INJECTION, SOLUTION INTRAVENOUS at 03:07

## 2018-07-15 RX ADMIN — HEPARIN SODIUM 5000 UNITS: 5000 INJECTION, SOLUTION INTRAVENOUS; SUBCUTANEOUS at 09:07

## 2018-07-15 RX ADMIN — SODIUM CHLORIDE 3 G: 9 INJECTION, SOLUTION INTRAVENOUS at 06:07

## 2018-07-15 RX ADMIN — HEPARIN SODIUM 5000 UNITS: 5000 INJECTION, SOLUTION INTRAVENOUS; SUBCUTANEOUS at 02:07

## 2018-07-15 RX ADMIN — ACETYLCYSTEINE 4 ML: 100 SOLUTION ORAL; RESPIRATORY (INHALATION) at 07:07

## 2018-07-15 RX ADMIN — LEVETIRACETAM 500 MG: 5 INJECTION INTRAVENOUS at 09:07

## 2018-07-15 RX ADMIN — IPRATROPIUM BROMIDE AND ALBUTEROL SULFATE 3 ML: .5; 3 SOLUTION RESPIRATORY (INHALATION) at 01:07

## 2018-07-15 RX ADMIN — LORAZEPAM 1 MG: 2 INJECTION, SOLUTION INTRAMUSCULAR; INTRAVENOUS at 12:07

## 2018-07-15 RX ADMIN — FAMOTIDINE 20 MG: 10 INJECTION INTRAVENOUS at 09:07

## 2018-07-15 RX ADMIN — CHLORHEXIDINE GLUCONATE 15 ML: 1.2 RINSE ORAL at 09:07

## 2018-07-15 NOTE — PROGRESS NOTES
[facility] -   Critical Care Medicine  Progress Note    Patient Name: Talia Mcfarland  MRN: [id number]  Admission Date: 7/11/2018  Hospital Length of Stay: 4 days  Code Status: Prior  Attending Provider: Zayda England MD  Primary Care Provider: Tank Castro MD   Principal Problem: HAP (hospital-acquired pneumonia)    Subjective:     HPI:  68-year-old female patient brought by EMS to the emergency department from dialysis after she complained of malaise and had altered mental status after dialysis today.    Hospital/ICU Course:  While in the ER the patient had pulseless electrical activity and she had CPR performed for about 6 min.  Patient was seen and examined in the emergency room.  She was intubated.  Chest x-ray post intubation showed low ET tube ET tube was pulled out.  Repeat chest x-ray showed good position of ET tube.  7/12 Seen and examined. Intubated, no sedation, no pressors. Tmax 100.8. Bld cx GNR. CXR and ABG reviewed.   7/13 patient seen and examined, intubated, not sedated no pressors.  Chest x-ray and ABG reviewed no changes in mental status.  Breathing over the vent.  Not moving extremities unresponsive.  7/14 seen and examined, intubated, no sedation.  Chest x-ray and ABG reviewed.  No overall change in patient's status.  Had dialysis today.  7/15 - No neuro improvement still resting on full vent support with TF.  Family at bedside.     Review of Systems   Unable to perform ROS: Intubated       Objective:     Vital Signs (Most Recent):  Temp: 98.2 °F (36.8 °C) (07/15/18 0300)  Pulse: 92 (07/15/18 1312)  Resp: (!) 21 (07/15/18 1312)  BP: (!) 164/71 (07/15/18 0600)  SpO2: 98 % (07/15/18 1312) Vital Signs (24h Range):  Temp:  [98 °F (36.7 °C)-98.6 °F (37 °C)] 98.2 °F (36.8 °C)  Pulse:  [82-97] 92  Resp:  [8-30] 21  SpO2:  [98 %-100 %] 98 %  BP: (128-186)/(57-88) 164/71     Weight: 49 kg (108 lb 0.4 oz)  Body mass index is 21.82 kg/m².      Intake/Output Summary (Last 24 hours) at  07/15/18 1314  Last data filed at 07/15/18 0600   Gross per 24 hour   Intake             1030 ml   Output                0 ml   Net             1030 ml       Physical Exam   Constitutional: She appears well-developed and well-nourished.  Non-toxic appearance. She has a sickly appearance. She appears ill. No distress. She is intubated.   HENT:   Head: Normocephalic and atraumatic.   Mouth/Throat: Oropharynx is clear and moist and mucous membranes are normal.   Eyes: EOM and lids are normal.   Pupils sluggish   Neck: Trachea normal. Neck supple. Carotid bruit is not present.   Cardiovascular: Normal rate, regular rhythm and normal heart sounds.    Pulses:       Radial pulses are 2+ on the right side, and 2+ on the left side.        Dorsalis pedis pulses are 1+ on the right side, and 1+ on the left side.   Pulmonary/Chest: Effort normal. No accessory muscle usage. She is intubated. No respiratory distress. She has decreased breath sounds.   Abdominal: Soft. Normal appearance. She exhibits no distension. Bowel sounds are decreased. There is no tenderness.   Musculoskeletal:        Right foot: There is no deformity.        Left foot: There is no deformity.   Mild edema   Feet:   Right Foot:   Skin Integrity: Positive for skin breakdown.   Left Foot:   Skin Integrity: Positive for skin breakdown.   Lymphadenopathy:     She has no cervical adenopathy.   Neurological: She is unresponsive.   Weak corneal reflex and pupils sluggish.  No withdrawal to pain   Skin: Skin is warm and dry. Capillary refill takes less than 2 seconds. No rash noted. No cyanosis.            Vents:  Vent Mode: A/C (07/15/18 1241)  Ventilator Initiated: Yes (07/11/18 1454)  Set Rate: 16 bmp (07/15/18 1241)  Vt Set: 350 mL (07/15/18 1241)  Pressure Support: 0 cmH20 (07/15/18 1241)  PEEP/CPAP: 8 cmH20 (07/15/18 1241)  Oxygen Concentration (%): 35 (07/15/18 1037)  Peak Airway Pressure: 25 cmH2O (07/15/18 1241)  Plateau Pressure: 23 cmH20 (07/15/18  1241)  Total Ve: 6.32 mL (07/15/18 1241)  F/VT Ratio<105 (RSBI): (!) 46.51 (07/15/18 1037)    Lines/Drains/Airways     Drain                 Hemodialysis AV Fistula Left upper arm -- days         NG/OG Tube 07/11/18 1811 orogastric 18 Fr. Left mouth 3 days          Airway                 Airway - Non-Surgical 07/11/18 1426 Endotracheal Tube 3 days          Pressure Ulcer                 Pressure Injury 07/12/18 Left Heel Deep tissue injury 3 days         Pressure Injury 07/12/18 Right Heel Deep tissue injury 3 days          Peripheral Intravenous Line                 Peripheral IV - Single Lumen 07/11/18 1240 Right Forearm 4 days         Peripheral IV - Single Lumen 07/11/18 1438 Right  3 days                Significant Labs:    CBC/Anemia Profile:    Recent Labs  Lab 07/14/18  0405 07/15/18  0405   WBC 16.31* 15.84*   HGB 10.1* 9.9*   HCT 32.7* 33.0*   * 89*   MCV 80* 80*   RDW 19.7* 19.9*        Chemistries:    Recent Labs  Lab 07/14/18  0405 07/15/18  0405    139   K 4.2 4.0   CL 99 99   CO2 26 25   BUN 47* 68*   CREATININE 3.9* 4.6*   CALCIUM 10.0 9.8   ALBUMIN 2.4* 2.3*   PROT 6.5 6.5   BILITOT 0.9 0.8   ALKPHOS 111 145*   ALT 19 16   AST 19 16   MG 2.2 2.4   PHOS 3.5 3.6       ABGs:   Recent Labs  Lab 07/15/18  0504   PH 7.449   PCO2 47.6*   HCO3 33.1*   POCSATURATED 98   BE 9     All pertinent labs within the past 24 hours have been reviewed.        Assessment/Plan:     Neuro   Acute encephalopathy    Hypoxic vs Toxic vs Metabolic or likely multifactorial  Neuro following  No neuro improvement        Psychiatric   Hx of noncompliance with medical treatment, presenting hazards to health    Hx of missing RRT weekly and often stopping RRT short per report        Pulmonary   * HAP (hospital-acquired pneumonia)    Sputum + Acinetobacter  Cont Unasyn, Duoneb, Mucoyst, CPT and OET suctioning  CXR in AM          Acute respiratory failure with hypoxia    Vent settings reviewed and adjusted  Cont full vent  support  SBT daily        Cardiac/Vascular   Cardiac arrest    Cont ICU cardiac monitoring        Renal/   E. coli urinary tract infection    Cont Cipro        ESRD on hemodialysis    Renal following  RRT tomorrow if family wishes to cont aggressive Rx        ID             Septicemia due to Acinetobacter species    Cont Unasyn        Hematology   Thrombocytopenia    No active bleeding  CBC daily        Endocrine   Type 2 diabetes mellitus with hyperglycemia, without long-term current use of insulin    SSI        Orthopedic   Deep tissue injury    Turn Q 2 hours and continue wound care          Preventive Measures and Monitoring:   Stress Ulcer: Pepcid  Nutrition: TF  Glucose control: SSI  Bowel prophylaxis: Miralax  DVT prophylaxis: SQ Hep  Hx CAD on B-Blocker: no hx CAD  Head of Bed/Reposition: Elevate HOB and turn Q1-2 hours   Early Mobility: bed rest  SBT: daily  RASS goal: no sedation  Vent Day: #5  OG Day: #5  IVAB Day: #3  Code Status: FULL  Pneumonia Vaccine: UTD    Counseling/Consultation:I have discussed the care of this patient in detail with the bedside nursing staff and Dr. Kline and Dr. England    Discussed care in detail with family yesterday and again today.  They are deciding on aggressive vs conservative care.    Critical Care Time: 54 minutes  Critical secondary to Patient has a condition that poses threat to life and bodily function: Resp Failure intubated on mechanical ventilation  Patient has an abrupt change in neurologic status: Encephalopathy      Critical care was time spent personally by me on the following activities: development of treatment plan with patient or surrogate and bedside caregivers, discussions with consultants, evaluation of patient's response to treatment, examination of patient, ordering and performing treatments and interventions, ordering and review of laboratory studies, ordering and review of radiographic studies, pulse oximetry, re-evaluation of patient's  condition. This critical care time did not overlap with that of any other provider or involve time for any procedures.     Adolfo Dow NP  Critical Care Medicine  [facility]

## 2018-07-15 NOTE — ASSESSMENT & PLAN NOTE
-acute hyper capneic and hypoxic respiratory failure   - in setting of multilobar pneumonia   - continue with broad spectrum antibotics and follow up on cultures   - pe rule out with cta  7/15  Pneumonia continue with antibotics

## 2018-07-15 NOTE — SUBJECTIVE & OBJECTIVE
Interval History: no acute events     Review of Systems   Unable to perform ROS: intubated     Objective:     Vital Signs (Most Recent):  Temp: 98.2 °F (36.8 °C) (07/15/18 0300)  Pulse: 87 (07/15/18 0853)  Resp: 20 (07/15/18 0853)  BP: (!) 164/71 (07/15/18 0600)  SpO2: 99 % (07/15/18 0853) Vital Signs (24h Range):  Temp:  [98 °F (36.7 °C)-98.6 °F (37 °C)] 98.2 °F (36.8 °C)  Pulse:  [82-99] 87  Resp:  [8-30] 20  SpO2:  [99 %-100 %] 99 %  BP: (128-186)/(51-93) 164/71     Weight: 49 kg (108 lb 0.4 oz)  Body mass index is 21.82 kg/m².     SpO2: 99 %  O2 Device (Oxygen Therapy): ventilator      Intake/Output Summary (Last 24 hours) at 07/15/18 0954  Last data filed at 07/15/18 0600   Gross per 24 hour   Intake             1130 ml   Output                0 ml   Net             1130 ml       Lines/Drains/Airways     Drain                 Hemodialysis AV Fistula Left upper arm -- days         NG/OG Tube 07/11/18 1811 orogastric 18 Fr. Left mouth 3 days          Airway                 Airway - Non-Surgical 07/11/18 1426 Endotracheal Tube 3 days          Pressure Ulcer                 Pressure Injury 07/12/18 Left Heel Deep tissue injury 3 days         Pressure Injury 07/12/18 Right Heel Deep tissue injury 3 days          Peripheral Intravenous Line                 Peripheral IV - Single Lumen 07/11/18 1240 Right Forearm 3 days         Peripheral IV - Single Lumen 07/11/18 1438 Right  3 days                Physical Exam   Constitutional: She is oriented to person, place, and time. She appears well-developed and well-nourished. No distress.   HENT:   Head: Normocephalic and atraumatic.   Nose: Nose normal.   Mouth/Throat: Oropharynx is clear and moist.   Eyes: Conjunctivae and EOM are normal. No scleral icterus.   Neck: Normal range of motion. Neck supple. No JVD present. No thyromegaly present.   Cardiovascular: Normal rate, regular rhythm, S1 normal and S2 normal.  Exam reveals no gallop, no S3, no S4 and no friction rub.     Murmur heard.  Pulmonary/Chest: Effort normal and breath sounds normal. No stridor. No respiratory distress. She has no wheezes. She has no rales. She exhibits no tenderness.   Abdominal: Soft. Bowel sounds are normal. She exhibits no distension and no mass. There is no tenderness. There is no rebound.   Genitourinary:   Genitourinary Comments: Deferred   Musculoskeletal: Normal range of motion. She exhibits no edema, tenderness or deformity.   Lymphadenopathy:     She has no cervical adenopathy.   Neurological: She is alert and oriented to person, place, and time. She exhibits normal muscle tone. Coordination normal.   Skin: Skin is warm and dry. No rash noted. She is not diaphoretic. No erythema. No pallor.   Psychiatric: She has a normal mood and affect. Her behavior is normal. Judgment and thought content normal.   Nursing note and vitals reviewed.      Significant Labs:   All pertinent lab results from the last 24 hours have been reviewed. and   Recent Lab Results       07/15/18  0504 07/15/18  0504 07/15/18  0405 07/14/18  2346 07/14/18  1845      Albumin   2.3(L)       Alkaline Phosphatase   145(H)       Allens Test  Pass        ALT   16       Anion Gap   15       Aniso   Moderate       AST   16       Baso #   0.03       Basophil%   0.2       Total Bilirubin   0.8  Comment:  For infants and newborns, interpretation of results should be based  on gestational age, weight and in agreement with clinical  observations.  Premature Infant recommended reference ranges:  Up to 24 hours.............<8.0 mg/dL  Up to 48 hours............<12.0 mg/dL  3-5 days..................<15.0 mg/dL  6-29 days.................<15.0 mg/dL         Site  RR        BUN, Bld   68(H)       Calcium   9.8       Chloride   99       CO2   25       Creatinine   4.6(H)       DelSys  Adult Vent        Differential Method   Automated       eGFR if    11(A)       eGFR if non    9  Comment:  Calculation used to  obtain the estimated glomerular filtration  rate (eGFR) is the CKD-EPI equation.   (A)       Eos #   0.3       Eosinophil%   2.0       FiO2  35        Glucose   154(H)       Gran # (ANC)   12.9(H)       Gran%   81.9(H)       Hematocrit   33.0(L)       Hemoglobin   9.9(L)       Hypo   Occasional       Lymph #   1.4       Lymph%   8.7(L)       Magnesium   2.4       MCH   24.1(L)       MCHC   30.0(L)       MCV   80(L)       Mode  AC/PRVC        Mono #   1.2(H)       Mono%   7.6       MPV   SEE COMMENT  Comment:  Result not available.       Ovalocytes   Occasional       PEEP  8        Phosphorus   3.6       Platelet Estimate   Decreased(A)       Platelets   89(L)       POC BE  9        POC HCO3  33.1(H)        POC PCO2  47.6(H)        POC PH  7.449        POC PO2  94        POC SATURATED O2  98        POCT Glucose 161(H)   139(H) 140(H)     Poik   Slight       Poly   Occasional       Potassium   4.0       Total Protein   6.5       Rate  16        RBC   4.11       RDW   19.9(H)       Sample  ARTERIAL        Schistocytes   Present       Sodium   139       Spherocytes   Occasional       Stomatocytes   Present       Target Cells   Occasional       Tear Drop Cells   Occasional       Vt  350        WBC   15.84(H)                   07/14/18  1229      Albumin      Alkaline Phosphatase      Allens Test      ALT      Anion Gap      Aniso      AST      Baso #      Basophil%      Total Bilirubin      Site      BUN, Bld      Calcium      Chloride      CO2      Creatinine      DelSys      Differential Method      eGFR if       eGFR if non       Eos #      Eosinophil%      FiO2      Glucose      Gran # (ANC)      Gran%      Hematocrit      Hemoglobin      Hypo      Lymph #      Lymph%      Magnesium      MCH      MCHC      MCV      Mode      Mono #      Mono%      MPV      Ovalocytes      PEEP      Phosphorus      Platelet Estimate      Platelets      POC BE      POC HCO3      POC PCO2      POC PH       POC PO2      POC SATURATED O2      POCT Glucose 162(H)     Poik      Poly      Potassium      Total Protein      Rate      RBC      RDW      Sample      Schistocytes      Sodium      Spherocytes      Stomatocytes      Target Cells      Tear Drop Cells      Vt      WBC            Significant Imaging: Echocardiogram:   2D echo with color flow doppler:   Results for orders placed or performed during the hospital encounter of 07/11/18   2D echo with color flow doppler   Result Value Ref Range    EF 60 55 - 65    Aortic Valve Stenosis MODERATE (A)     Est. PA Systolic Pressure 59.09 (A)     Mitral Valve Mobility MILDLY RESTRICTED     Tricuspid Valve Regurgitation MILD TO MODERATE     and X-Ray: CXR: X-Ray Chest 1 View (CXR):   Results for orders placed or performed during the hospital encounter of 07/11/18   X-Ray Chest 1 View    Narrative    EXAMINATION:  XR CHEST 1 VIEW    CLINICAL HISTORY:  PNA;    TECHNIQUE:  Single frontal view of the chest was performed.    COMPARISON:  07/14/2018    FINDINGS:  Patchy airspace disease in the perihilar region on the left side with evidence of air bronchograms.  Differential considerations would include ARDS/pulmonary consolidation.  Endotracheal tube in good position at the T4-T5 level.  NG tube is courses through the films.  The tip is not on the films.      Impression    Persistent airspace disease with air bronchograms identified on the left side.  Cardiomegaly.  Possible worsening perihilar infiltrates when compared to 07/14/2018.      Electronically signed by: Benedict Reich MD  Date:    07/15/2018  Time:    08:15

## 2018-07-15 NOTE — SUBJECTIVE & OBJECTIVE
Review of Systems   Unable to perform ROS: Intubated     Objective:     Vital Signs (Most Recent):  Temp: 98.2 °F (36.8 °C) (07/15/18 0300)  Pulse: 87 (07/15/18 0853)  Resp: 20 (07/15/18 0853)  BP: (!) 164/71 (07/15/18 0600)  SpO2: 99 % (07/15/18 0853) Vital Signs (24h Range):  Temp:  [98 °F (36.7 °C)-98.6 °F (37 °C)] 98.2 °F (36.8 °C)  Pulse:  [82-99] 87  Resp:  [8-30] 20  SpO2:  [99 %-100 %] 99 %  BP: (128-186)/(51-93) 164/71     Weight: 49 kg (108 lb 0.4 oz)  Body mass index is 21.82 kg/m².    Intake/Output Summary (Last 24 hours) at 07/15/18 1032  Last data filed at 07/15/18 0600   Gross per 24 hour   Intake             1105 ml   Output                0 ml   Net             1105 ml      Physical Exam   Constitutional:   Ill appearing, frail   HENT:   ETT +    Eyes:   Pupils react some according to the neurology examination   Neck: Neck supple.   Cardiovascular:   Tachycardic   Pulmonary/Chest: Effort normal. No respiratory distress.   + ronchi bilaterally    Abdominal: Soft. There is no tenderness.   Musculoskeletal: She exhibits no edema.   Neurological:   Unresponsive , not moving extremities      Skin: Skin is warm.   Nursing note and vitals reviewed.      Significant Labs: All pertinent labs within the past 24 hours have been reviewed.    Significant Imaging: I have reviewed all pertinent imaging results/findings within the past 24 hours.

## 2018-07-15 NOTE — SUBJECTIVE & OBJECTIVE
Interval History:  Patient is still intubated.  Minimal brainstem reflexes.  Ischemic encephalopathy.  No dialysis today    Review of patient's allergies indicates:   Allergen Reactions    Tylenol [acetaminophen] Other (See Comments)     Headache     Current Facility-Administered Medications   Medication Frequency    acetylcysteine 100 mg/ml (10%) solution 4 mL BID    albumin human 25% bottle 12.5 g PRN    albuterol-ipratropium 2.5 mg-0.5 mg/3 mL nebulizer solution 3 mL Q6H    ampicillin-sulbactam 3 g in sodium chloride 0.9 % 100 mL IVPB (ready to mix system) Q24H    bisacodyl suppository 10 mg Daily PRN    chlorhexidine 0.12 % solution 15 mL BID    ciprofloxacin (CIPRO)400mg/200ml D5W IVPB 400 mg Q12H    dextrose 50% injection 12.5 g PRN    famotidine (PF) injection 20 mg Daily    glucagon (human recombinant) injection 1 mg PRN    heparin (porcine) injection 5,000 Units Q8H    insulin aspart U-100 pen 0-5 Units Q6H PRN    levETIRAcetam in NaCl (iso-os) IVPB 500 mg Q12H    [START ON 7/16/2018] levETIRAcetam in NaCl (iso-os) IVPB 500 mg Every Mon, Wed, Fri    polyethylene glycol packet 17 g Daily       Objective:     Vital Signs (Most Recent):  Temp: 98.2 °F (36.8 °C) (07/15/18 0300)  Pulse: 92 (07/15/18 1312)  Resp: (!) 21 (07/15/18 1312)  BP: (!) 164/71 (07/15/18 0600)  SpO2: 98 % (07/15/18 1312)  O2 Device (Oxygen Therapy): ventilator (07/15/18 1312) Vital Signs (24h Range):  Temp:  [98 °F (36.7 °C)-98.6 °F (37 °C)] 98.2 °F (36.8 °C)  Pulse:  [82-97] 92  Resp:  [8-30] 21  SpO2:  [98 %-100 %] 98 %  BP: (128-186)/(57-88) 164/71     Weight: 49 kg (108 lb 0.4 oz) (07/15/18 0500)  Body mass index is 21.82 kg/m².  Body surface area is 1.43 meters squared.    I/O last 3 completed shifts:  In: 1750 [NG/GT:950; IV Piggyback:800]  Out: -     Physical Exam   Constitutional: She appears well-developed and well-nourished.  Non-toxic appearance. She has a sickly appearance. She appears ill. No distress. She is  intubated.   HENT:   Head: Normocephalic and atraumatic.   Mouth/Throat: Oropharynx is clear and moist and mucous membranes are normal.   Eyes: EOM and lids are normal.   Pupils sluggish   Neck: Trachea normal. Neck supple. Carotid bruit is not present.   Cardiovascular: Normal rate, regular rhythm and normal heart sounds.    Pulses:       Radial pulses are 2+ on the right side, and 2+ on the left side.        Dorsalis pedis pulses are 1+ on the right side, and 1+ on the left side.   Pulmonary/Chest: Effort normal. No accessory muscle usage. She is intubated. No respiratory distress. She has decreased breath sounds.   Abdominal: Soft. Normal appearance. She exhibits no distension. Bowel sounds are decreased. There is no tenderness.   Musculoskeletal:        Right foot: There is no deformity.        Left foot: There is no deformity.   Mild edema   Feet:   Right Foot:   Skin Integrity: Positive for skin breakdown.   Left Foot:   Skin Integrity: Positive for skin breakdown.   Lymphadenopathy:     She has no cervical adenopathy.   Neurological: She is unresponsive.   Weak corneal reflex and pupils sluggish.  No withdrawal to pain   Skin: Skin is warm and dry. Capillary refill takes less than 2 seconds. No rash noted. No cyanosis.            Significant Labs:  All labs within the past 24 hours have been reviewed.     Significant Imaging:  Labs: Reviewed

## 2018-07-15 NOTE — PROGRESS NOTES
INPATIENT   NEURO  CRITICAL CARE PROGRESS NOTE    Talia Mcfarland   68 y.o. female  DATE 7/15/2018        Patient remains intubated. She is unresponsive to verbal or painful stimulation. She has eye fluttering which were already ruled out as seizures  She has few breaths over the vent.  Last night she received 1 mg of Ativan by Dr. [facility] ( [facility]).      [place] Coma Scale (GCS):3  Past Medical History:   Diagnosis Date    Anemia     CHF (congestive heart failure)     Diabetes mellitus     DVT (deep venous thrombosis)     Left leg    ESRD on hemodialysis 2 years     Gastroparesis     Hypertension     Type II or unspecified type diabetes mellitus with renal manifestations, not stated as uncontrolled(250.40) 30 years     Urinary tract infection due to extended-spectrum beta lactamase (ESBL)-producing Klebsiella      Past Surgical History:   Procedure Laterality Date     SECTION      CHOLECYSTECTOMY      KUSHAL Bilateral     LBP severe with multiple KUSHAL     ROTATOR CUFF REPAIR      UMBILICAL HERNIA REPAIR      VEIN BYPASS SURGERY Left      Family History   Problem Relation Age of Onset    Kidney disease Neg Hx      Social History   Substance Use Topics    Smoking status: Never Smoker    Smokeless tobacco: Never Used    Alcohol use No       Review of patient's allergies indicates:   Allergen Reactions    Tylenol [acetaminophen] Other (See Comments)     Headache        Scheduled Meds:   acetylcysteine 100 mg/ml (10%)  4 mL Nebulization BID    albuterol-ipratropium  3 mL Nebulization Q6H    ampicillin-sulbactim (UNASYN) IVPB  3 g Intravenous Q24H    chlorhexidine  15 mL Mouth/Throat BID    ciprofloxacin (CIPRO)400mg/200ml D5W IVPB  400 mg Intravenous Q12H    famotidine (PF)  20 mg Intravenous Daily    heparin (porcine)  5,000 Units Subcutaneous Q8H    levetiracetam IVPB  500 mg Intravenous Q12H    [START ON 2018] levetiracetam IVPB  500 mg Intravenous Every Mon, Wed, Fri     polyethylene glycol  17 g Oral Daily     Continuous Infusions:  PRN Meds:albumin human 25%, bisacodyl, dextrose 50%, glucagon (human recombinant), insulin aspart U-100        OBJECTIVE:     Vital Signs (Most Recent)  Temp: 98.2 °F (36.8 °C) (07/15/18 0300)  Pulse: 89 (07/15/18 1037)  Resp: 16 (07/15/18 1037)  BP: (!) 164/71 (07/15/18 0600)  SpO2: 100 % (07/15/18 1037)     Vital Signs Range (Last 24H):  Temp:  [98 °F (36.7 °C)-98.6 °F (37 °C)]   Pulse:  [82-99]   Resp:  [8-30]   BP: (128-186)/(51-93)   SpO2:  [99 %-100 %]     Physical Exam:  General: Intubated and unresponsive  HEENT:   Pupils isocoria extremely sluggish, corneal absent on the right, reactive on the left  Lungs: CTA,  No rhonchi, no rales  Heart: Regular Rate and rhythm, no murmurs, rubs and or gallops  Abdomen,  bowel sounds diminished  Extremities: No edema,   Skin: No rash, no ecchymoses,         NEURO    Mental Status:   Unresponsive to verbal and or painful stimulation    SPEECH:  intubated  CRANIAL NERVES:      II: visual acuity  UNABLE TO EVALUATE   II: visual fields    II: pupils Equal, round,very sluggishly  reactive to light   III,VII: ptosis UNABLE TO EVALUATE   III,IV,VI: extraocular muscles  UNABLE TO EVALUATE   V: mastication Normal   V: facial light touch sensation  UNABLE TO EVALUATE   V,VII: corneal reflex  On the left, right absent   VII: facial muscle function - upper  UNABLE TO EVALUATE   VII: facial muscle function - lower No facial asymmetry   VIII: hearing Not tested   IX: soft palate elevation  UNABLE TO EVALUATE   IX,X: gag reflex ABSENT   XI: trapezius strength  UNABLE TO EVALUATE   XI: sternocleidomastoid strength UNABLE TO EVALUATE   XI: neck flexion strength  UNABLE TO EVALUATE   XII: tongue strength  UNABLE TO EVALUATE         MOTOR:Upper Extremities and Lower Extremities  No movement  TONE:   Flaccid      MUSCLE MASS:  REFLEXES: Deep tendon reflexes tested:  B/L  Upper extremities:               biceps (C5, C6):0                brachioradialis (C5, C6):0               triceps (C6, C7),0     Lower extremities:                knee or patellar (L2, 3, 4):0               ankle (S1, S2):0      Plantar responses:  Spinal reflex from yesterday has diminished significantly  Clonus:  absent  Muscle Fasciculations:  none    SENSORY: Spinal reflex in the Right LE trace      EXTRAPYRAMIDALS:  None,       Laboratory:  Lab Results   Component Value Date    WBC 15.84 (H) 07/15/2018    HGB 9.9 (L) 07/15/2018    HCT 33.0 (L) 07/15/2018    PLT 89 (L) 07/15/2018    CHOL 176 12/23/2016    TRIG 85 12/23/2016    HDL 50 12/23/2016    ALT 16 07/15/2018    AST 16 07/15/2018     07/15/2018    K 4.0 07/15/2018    CL 99 07/15/2018    CREATININE 4.6 (H) 07/15/2018    BUN 68 (H) 07/15/2018    CO2 25 07/15/2018    TSH 1.353 07/11/2018    INR 1.1 12/22/2016    HGBA1C 5.6 07/11/2018        Diagnostic Results:none   ( All images reviewed Independently)   Imaging Results          CTA Chest Non Coronary (Final result)  Result time 07/11/18 17:46:11    Final result by Adolfo Douglas Jr., MD (07/11/18 17:46:11)                 Impression:      No pulmonary embolus.    Multilobar pneumonia.    Enlargement of the pulmonary arteries consistent with pulmonary artery hypertension.    Marked cardiomegaly.    All CT scan at this facility use dose modulation, iterative reconstruction, and/or weight base dosing when appropriate to reduce radiation dose to as low as reasonably achievable.      Electronically signed by: Adolfo Douglas Jr., MD  Date:    07/11/2018  Time:    17:46             Narrative:    EXAMINATION:  CTA CHEST NON CORONARY    CLINICAL HISTORY:  Chest pain, acute, PE suspected, high pretest prob;    TECHNIQUE:  Axial CTA images performed through the chest after the administration of 100 cc intravenous contrast. Maximum intensity projections were performed and interpreted.    FINDINGS:  There is no evidence of pulmonary embolus. Pulmonary arteries are  enlarged with the main pulmonary artery measuring 4.5 cm.  The heart is markedly enlarged with dense atherosclerotic calcifications of the coronary arteries.  The great vessels are patent.  Endotracheal tube in satisfactory position.  No mediastinal adenopathy.  Probable hemodialysis graft partially visualized the left upper extremity.    Extensive areas of consolidation involving the left upper and lower lobes.  There is also consolidation in the medial right lower lobe and patchy areas of ground-glass opacification within the right middle lobe.  Visualized abdominal organs are unremarkable.  Osseous structures are intact.  No effusion or pneumothorax.                               CT Head Without Contrast (Final result)  Result time 07/11/18 17:19:21    Final result by Adolfo Douglas Jr., MD (07/11/18 17:19:21)                 Impression:      1. No acute intracranial findings.  Stable exam.  All CT scans at this facility are performed  using dose modulation techniques as appropriate to performed exam including the following:  automated exposure control; adjustment of mA and/or kV according to the patients size (this includes techniques or standardized protocols for targeted exams where dose is matched to indication/reason for exam: i.e. extremities or head);  iterative reconstruction technique.      Electronically signed by: Adolfo Douglas Jr., MD  Date:    07/11/2018  Time:    17:19             Narrative:    EXAMINATION:  CT HEAD WITHOUT CONTRAST    CLINICAL HISTORY:  Decreased alertness;    TECHNIQUE:  CT scan was obtained of the head without administration of contrast.    COMPARISON:  07/11/2018.    FINDINGS:  Stable atrophy with periventricular white matter changes.  No extra-axial acute fluid collection.Ventricles and basal cisterns are normal.  No hemorrhage, mass effect or midline shift.  No other cerebral or cerebellar parenchymal abnormality.  Paranasal sinuses are clear.  Mastoid air cells are clear.   Calvarium is intact.                               X-Ray Chest AP Portable (Final result)  Result time 07/11/18 15:17:22    Final result by Vamshi Lynne MD (07/11/18 15:17:22)                 Impression:      As above.      Electronically signed by: Vamshi Lynne MD  Date:    07/11/2018  Time:    15:17             Narrative:    EXAMINATION:  XR CHEST AP PORTABLE    CLINICAL HISTORY:  post intubation; respiratory distress    TECHNIQUE:  AP view of the chest was performed.    COMPARISON:  Earlier today.    FINDINGS:  Endotracheal tube in satisfactory position.  Worsening left upper lobe pneumonia.                               X-Ray Foot Complete Left (Final result)  Result time 07/11/18 13:18:19    Final result by Vamshi Lynne MD (07/11/18 13:18:19)                 Impression:      Charcot arthropathy.  Superimposed osteomyelitis is not excluded.  Consider MRI correlation.      Electronically signed by: Vamshi Lynne MD  Date:    07/11/2018  Time:    13:18             Narrative:    EXAMINATION:  XR FOOT COMPLETE 3 VIEW LEFT    CLINICAL HISTORY:  Osteomyelitis, unspecified.   Pain.    TECHNIQUE:  3 views.    COMPARISON:  None    FINDINGS:  Severe osteopenia.  Extensive vascular calcifications.  Plantar subluxation of the midfoot.  Sclerosis and partial collapse of the middle cuneiform.  Extensive erosive changes throughout the midfoot.  Diffuse soft tissue swelling.  Prominent plantar spur.                               X-Ray Chest AP Portable (Final result)  Result time 07/11/18 13:15:44    Final result by Vamshi Lynne MD (07/11/18 13:15:44)                 Impression:      Left upper lobe pneumonia.  Recommend close follow-up after treatment.      Electronically signed by: Vamshi Lynne MD  Date:    07/11/2018  Time:    13:15             Narrative:    EXAMINATION:  XR CHEST AP PORTABLE    CLINICAL HISTORY:  cough;    TECHNIQUE:  AP view of the chest was performed.    COMPARISON:  October 2, 2017    FINDINGS:  Left upper  lobe consolidation.    Stable cardiomegaly.  Aortic atherosclerosis.  No acute osseous findings demonstrated.                               US Lower Extremity Veins Bilateral (Final result)  Result time 07/11/18 13:15:04    Final result by Vamshi Lynne MD (07/11/18 13:15:04)                 Impression:      Negative for bilateral lower extremity DVT.  Limited visualization of the left calf veins.      Electronically signed by: Vamshi Lynne MD  Date:    07/11/2018  Time:    13:15             Narrative:    EXAMINATION:  US LOWER EXTREMITY VEINS BILATERAL    CLINICAL HISTORY:  Edema, unspecified    FINDINGS:  Grayscale and color Doppler sonography was formed through the bilateral lower extremities.    The visualized bilateral lower extremity veins are widely patent with normal augmentation, and compressibility and respiratory variability.  The left leg and calf veins were not visualized secondary to use marked soft tissue edema and skin thickening.  No evidence of fluid collection.                               CT Head Without Contrast (Final result)  Result time 07/11/18 12:18:25    Final result by Benedict Henderson MD (07/11/18 12:18:25)                 Impression:      Chronic microvascular ischemic changes.      Electronically signed by: Benedict Henderson MD  Date:    07/11/2018  Time:    12:18             Narrative:    EXAMINATION:  CT HEAD WITHOUT CONTRAST    CLINICAL HISTORY:  ams;    TECHNIQUE:  Low dose axial CT images obtained throughout the head without intravenous contrast. Sagittal and coronal reconstructions were performed.  All CT scans at this facility use dose modulation, iterative reconstruction and/or weight based dosing when appropriate to reduce radiation dose to as low as reasonably achievable.    COMPARISON:  03/14/2016    FINDINGS:  Intracranial compartment:    The brain parenchyma demonstrates areas of decreased attenuation with mild to moderate periventricular white matter consistent with chronic  microvascular ischemic changes..  No parenchymal mass, hemorrhage, edema or major vascular distribution infarct.  Vascular calcifications are noted.    Mild prominence of the sulci and ventricles are consistent with age-related involutional changes.    No extra-axial blood or fluid collections.    Skull/extracranial contents (limited evaluation): No fracture. Mastoid air cells and paranasal sinuses are essentially clear.                                07/15/2018    Assessment and Recommendations:  Patient with sepsis, Pneumonia, UTI,  cardiac arrest, respiratory failure,  On dialysis she has been treated with antibiotics,   No significant improvement, she has been getting Modafinil, no significant improvement. Breathes over the vent ( 1 or 2 breaths only)  She has eye fluttering  , EEG was done seizures were ruled out, she had intermittent spikes in between with triphasic waves,    This could also be extrapyramidals from hypoxia or renal failure. She received 1 mg of Ativan by  eICU physician. She has been treated with Keppra, with no significant improvement.  Patient is not brain dead, breathes over the vent, pupil reactive      Differential diagnosis:  1) Metabolic Encephalopathy  2) Sepsis encephalopathy  3) Toxic Encephalopathy  4) Hypoxic Encephalopathy      Recommendations:  1) Medical management  2) MRI is pending  3) If in doubt for CNS infection, can do LP and send CSF for protein, glucose, cell count and gram stain  4) Prolong EEG was recommended by the eICU, however, we do not have that here, in that case, patient needs to be transferred to [place]. I personally, do not believe she needs prolonged EEG, but it was a recommendation put in by the eICU  5) Poor prognosis    Family: No family members at the bedside              50 minutes of critical care provided    Nirav Abbasi MD., Ph.D., MS

## 2018-07-15 NOTE — PROGRESS NOTES
[facility] -   Nephrology  Progress Note    Patient Name: Talia Mcfarland  MRN: [id number]  Admission Date: 7/11/2018  Hospital Length of Stay: 4 days  Attending Provider: Zayda England MD   Primary Care Physician: Tank Castro MD  Principal Problem:HAP (hospital-acquired pneumonia)    Subjective:     HPI: Talia Mcfarland is a 78 y old AA woman with h/o HTN, PAD , ESRD on HD ( MWF , [facility] ) , DM-2 , highly noncompliant with outpatient dialysis treatments, patient misses at least 1 treatment every week for outpatient dialysis team, patient went to hemodialysis treatment today, she had some fever and nausea at the dialysis unit, and a set of blood cultures were drawn and patient received a g of vancomycin and was sent to the emergency room, while she was in the ER here patient developed cardiac arrest requiring CPR and was intubated.  Patient started on broad-spectrum antibiotics.  She also has a wound on her left ft, significant peripheral edema noted likely due to nonadherence with dialysis.  CT chest done on admission showed significant left-sided pneumonia.    Interval History:  Patient is still intubated.  Minimal brainstem reflexes.  Ischemic encephalopathy.  No dialysis today    Review of patient's allergies indicates:   Allergen Reactions    Tylenol [acetaminophen] Other (See Comments)     Headache     Current Facility-Administered Medications   Medication Frequency    acetylcysteine 100 mg/ml (10%) solution 4 mL BID    albumin human 25% bottle 12.5 g PRN    albuterol-ipratropium 2.5 mg-0.5 mg/3 mL nebulizer solution 3 mL Q6H    ampicillin-sulbactam 3 g in sodium chloride 0.9 % 100 mL IVPB (ready to mix system) Q24H    bisacodyl suppository 10 mg Daily PRN    chlorhexidine 0.12 % solution 15 mL BID    ciprofloxacin (CIPRO)400mg/200ml D5W IVPB 400 mg Q12H    dextrose 50% injection 12.5 g PRN    famotidine (PF) injection 20 mg Daily    glucagon (human recombinant) injection  1 mg PRN    heparin (porcine) injection 5,000 Units Q8H    insulin aspart U-100 pen 0-5 Units Q6H PRN    levETIRAcetam in NaCl (iso-os) IVPB 500 mg Q12H    [START ON 7/16/2018] levETIRAcetam in NaCl (iso-os) IVPB 500 mg Every Mon, Wed, Fri    polyethylene glycol packet 17 g Daily       Objective:     Vital Signs (Most Recent):  Temp: 98.2 °F (36.8 °C) (07/15/18 0300)  Pulse: 92 (07/15/18 1312)  Resp: (!) 21 (07/15/18 1312)  BP: (!) 164/71 (07/15/18 0600)  SpO2: 98 % (07/15/18 1312)  O2 Device (Oxygen Therapy): ventilator (07/15/18 1312) Vital Signs (24h Range):  Temp:  [98 °F (36.7 °C)-98.6 °F (37 °C)] 98.2 °F (36.8 °C)  Pulse:  [82-97] 92  Resp:  [8-30] 21  SpO2:  [98 %-100 %] 98 %  BP: (128-186)/(57-88) 164/71     Weight: 49 kg (108 lb 0.4 oz) (07/15/18 0500)  Body mass index is 21.82 kg/m².  Body surface area is 1.43 meters squared.    I/O last 3 completed shifts:  In: 1750 [NG/GT:950; IV Piggyback:800]  Out: -     Physical Exam   Constitutional: She appears well-developed and well-nourished.  Non-toxic appearance. She has a sickly appearance. She appears ill. No distress. She is intubated.   HENT:   Head: Normocephalic and atraumatic.   Mouth/Throat: Oropharynx is clear and moist and mucous membranes are normal.   Eyes: EOM and lids are normal.   Pupils sluggish   Neck: Trachea normal. Neck supple. Carotid bruit is not present.   Cardiovascular: Normal rate, regular rhythm and normal heart sounds.    Pulses:       Radial pulses are 2+ on the right side, and 2+ on the left side.        Dorsalis pedis pulses are 1+ on the right side, and 1+ on the left side.   Pulmonary/Chest: Effort normal. No accessory muscle usage. She is intubated. No respiratory distress. She has decreased breath sounds.   Abdominal: Soft. Normal appearance. She exhibits no distension. Bowel sounds are decreased. There is no tenderness.   Musculoskeletal:        Right foot: There is no deformity.        Left foot: There is no deformity.    Mild edema   Feet:   Right Foot:   Skin Integrity: Positive for skin breakdown.   Left Foot:   Skin Integrity: Positive for skin breakdown.   Lymphadenopathy:     She has no cervical adenopathy.   Neurological: She is unresponsive.   Weak corneal reflex and pupils sluggish.  No withdrawal to pain   Skin: Skin is warm and dry. Capillary refill takes less than 2 seconds. No rash noted. No cyanosis.            Significant Labs:  All labs within the past 24 hours have been reviewed.     Significant Imaging:  Labs: Reviewed    Assessment/Plan:     ESRD on hemodialysis    Family meeting with myself and Enrico Dow, LE_ICU.        Family will make a decision about her code status and her support system.  For now no need for dialysis today.  Will follow daily for dialysis needs.  Poor prognosis discussed with the family  Recommend comfort measures if the family agrees    Critical care time spent today is about 30 min  Greater than 50% of the time was spent in counseling with the patient's family and discussing the therapeutic options with all specialties.            Thank you for your consult.     Gurvinder Rios MD  Nephrology  [facility] -

## 2018-07-15 NOTE — PLAN OF CARE
Problem: Patient Care Overview  Goal: Plan of Care Review  Outcome: Ongoing (interventions implemented as appropriate)  Patient intubated, remains unresponsive. Patient periodically opens eyes, does not track. Patient exhibited an episode of rapid eyelid fluttering and rhythmic abdominal muscle spasms during shift, Dr. Simone Robledo notified; Ativan 1 mg ordered and administered for possible seizure activity, symptoms subsiding. Tube feeding continued, patient tolerating well. Auto rotation bed maintained, heels elevated. Dressings changed; clean dry and intact. VSS. Afebrile. Plan of care discussed with family. Will continue to monitor.

## 2018-07-15 NOTE — ASSESSMENT & PLAN NOTE
-dialysis patient with multilobar pneumonia  - continue with broad spectrum antibotics   -follow up on cultures   - continue with ventilator support   7/12  Continue zosyn and vanco   Gram negative rods bactermia   7/13  Continue with unasyn   7/14  Continue with unasyn/ciprofloxcin

## 2018-07-15 NOTE — PLAN OF CARE
Problem: Patient Care Overview  Goal: Plan of Care Review  Outcome: Ongoing (interventions implemented as appropriate)  No changes in pt's condition; daughter and son-in-law visited for several hours;  called to say he forgot the visiting hours and will be here later to visit.

## 2018-07-15 NOTE — PROGRESS NOTES
[facility]  Infectious Disease  Progress Note    Patient Name: Talia Mcfarland  MRN: [id number]  Admission Date: 7/11/2018  Length of Stay: 4 days  Attending Physician: Zayda England MD  Primary Care Provider: Tank Castro MD    Isolation Status: No active isolations  Assessment/Plan:      * HAP (hospital-acquired pneumonia)    Respiratory cultures-acinetobacter -will continue unasyn .        Acute cystitis    Isolate is E coli- will continue cipro        DM type 2 (diabetes mellitus, type 2)    Will continue Insulin sliding scale.        Severe sepsis    From pneumonia and Acinetobacter bacteremia .  Blood and resp cultures - Acinetobacter .-will use unasyn  Urine culture- E coli-will use cipro.        Acute respiratory failure    Continue ventilatory support .  Critical care follow up    07/13- will wean off ventilatory support if feasible    07/14- family is discussing end of life care-will follow up with them about withdrawal of care.  Continue ventilatory support.        ESRD on hemodialysis    Continue HD as per nephrology             Anticipated Disposition:     Thank you for your consult. I will follow-up with patient. Please contact us if you have any additional questions.    Adolfo Ulloa MD  Infectious Disease  [facility]    Subjective:     Principal Problem:HAP (hospital-acquired pneumonia)    HPI: 66 year old  female with history of ESRD on HD, DM2, HTN, PVD, who presented to the ER from dialysis center with alert mental status .  Hospital course was complicated by PEA  Cardiac arrest .She is presently intubated and blood cultures are now positive for GNR.  CTA of the chest showed multilobar pneumonia.    Interval History: 68 year old woman with resp failure-  Urine culture- E coli, respiratory/blood culture-Acinetobacter    Review of Systems   Unable to perform ROS: Intubated     Objective:     Vital Signs (Most Recent):  Temp: 98.2 °F (36.8 °C) (07/15/18  0300)  Pulse: 89 (07/15/18 0345)  Resp: 19 (07/15/18 0345)  BP: (!) 155/67 (07/15/18 0330)  SpO2: 100 % (07/15/18 0345) Vital Signs (24h Range):  Temp:  [98 °F (36.7 °C)-98.9 °F (37.2 °C)] 98.2 °F (36.8 °C)  Pulse:  [83-99] 89  Resp:  [8-27] 19  SpO2:  [99 %-100 %] 100 %  BP: (128-186)/(51-96) 155/67     Weight: 52 kg (114 lb 10.2 oz)  Body mass index is 23.15 kg/m².    Estimated Creatinine Clearance: 11.3 mL/min (A) (based on SCr of 3.9 mg/dL (H)).    Physical Exam   Constitutional:   Ill appearing, frail   HENT:   ETT +    Eyes:   Sluggish pupillary reaction to light   Neck: Neck supple.   Cardiovascular:   Tachy at 87    Pulmonary/Chest: Effort normal. No respiratory distress.   + ronchi bilaterally    Abdominal: Soft. There is no tenderness.   Musculoskeletal: She exhibits no edema.   Neurological:   Unresponsive , not moving extremities    Skin: Skin is warm.   Nursing note and vitals reviewed.      Significant Labs:   Blood Culture:   Recent Labs  Lab 07/11/18  1220 07/11/18  1240   LABBLOO Gram stain aer bottle: Gram negative rods   Results called to and read back by: Robert Staples RN 07/12/2018  06:20  ACINETOBACTER SPECIESOrganism further identified as Acinetobacter pittiiFor susceptibility see order #[id number] Gram stain aer bottle: Gram negative rods   Results called to and read back by: Robert Staples RN 07/12/2018  06:20  ACINETOBACTER SPECIESOrganism further identified as Acinetobacter pittiiSusceptibility pending     BMP:   Recent Labs  Lab 07/14/18  0405   *      K 4.2   CL 99   CO2 26   BUN 47*   CREATININE 3.9*   CALCIUM 10.0   MG 2.2     CBC:   Recent Labs  Lab 07/14/18  0405   WBC 16.31*   HGB 10.1*   HCT 32.7*   *     Respiratory Culture:   Recent Labs  Lab 07/11/18  2105   GSRESP <10 epithelial cells per low power field.  Rare WBC's  Rare budding yeast   RESPIRATORYC ACINETOBACTER SPECIESFewSusceptibility pendingFurther identified as Acinetobacter pittii     All  pertinent labs within the past 24 hours have been reviewed.    Significant Imaging: I have reviewed all pertinent imaging results/findings within the past 24 hours.

## 2018-07-15 NOTE — ASSESSMENT & PLAN NOTE
Family meeting with myself and Enrico Dow, ANP_ICU.        Family will make a decision about her code status and her support system.  For now no need for dialysis today.  Will follow daily for dialysis needs.  Poor prognosis discussed with the family  Recommend comfort measures if the family agrees    Critical care time spent today is about 30 min  Greater than 50% of the time was spent in counseling with the patient's family and discussing the therapeutic options with all specialties.

## 2018-07-15 NOTE — ASSESSMENT & PLAN NOTE
From pneumonia and Acinetobacter bacteremia .  Blood and resp cultures - Acinetobacter .-will use unasyn  Urine culture- E coli-will use cipro.

## 2018-07-15 NOTE — ASSESSMENT & PLAN NOTE
-initial rhythm as per ed notes PEA  - two rounds of cpr with rosc   - echo with right heart strain and elevated PA and increase central vein pressure pe was rule out cta negative   - respiratory arrest in setting of pneumonia  7/12  No improvement in mental status   Will continue with vent support   7/13  No improvement in mental status neurology on board   7/15  Waiting on family recommendation

## 2018-07-15 NOTE — EICU
eICU Note :    Called by the [facility]:    Problem:Repetetive eyelid blinking sustained , for min ,In the setting of an stage renal disease on hemodialysis.    Pertinent History and labs reviewed :68-year-old female With history of hypertension, PAD, end-stage renal disease on hemodialysis  highly noncompliant with outpatient dialysis treatment.      Treatment /Intervention given:Ativan 1 mg IV push given will need a 24 hour EEG to rule out subclinical seizures        Kacy Ramirez M.D  eICU Physician  2:21 AM  With about patient having hiccups, will give 10 mg of Thorazine x1  6:16 AM  AB.44/48/94/9/33 on mechanical ventilator respiratory rate 16/tidal volume 350/+8/35%

## 2018-07-15 NOTE — PROGRESS NOTES
[facility]   Cardiology  Progress Note    Patient Name: Talia Mcfarland  MRN: [id number]  Admission Date: 7/11/2018  Hospital Length of Stay: 4 days  Code Status: Prior   Attending Physician: Zayda England MD   Primary Care Physician: Tank Castro MD  Expected Discharge Date:   Principal Problem:HAP (hospital-acquired pneumonia)    Subjective:   HPI: Talia Mcfarland is a 68 y.o. female patient with  DM, DVT, ESRD, gastroparesis, and HTN who was sent to the Emergency Department from the dialysis clinic for AMS which onset gradually today after the pt completed dialysis. Per EMS, pt called from the clinic stating that she was not feeling well. Pt c/o malaise and a painful wound to the L foot. Information obtained from chart. Pt became apneic/agonal breathing and had lost pulse and required CPR for PEA cardiac arrest and ultimately achieved ROSC after epi/bicarb given. Pt had 2D ECHO with normal LV function but dilated RV and dec function, will be going to CT to r/o PE. Pt intubated/sedated, family at bedside discussed case.     Hospital Course:   7/12/18 - pt remains intubated off pressors, resumed HD; no improvement in mental status  7/13/18 - No acute events o/n, no change in mental status; neurology to evaluate, poor prognosis  7/14/18 - Family meeting today with neurology and staff discussing prognosis; no neuro recovery  7/15/18 - Pt is NOT DNR; family to meet later; pt had some eyemovement activty last night thought to be seizures given ativan; otherwise no purposeful movement; remains intubated; hemodyn stable.     Interval History: no acute events     Review of Systems   Unable to perform ROS: intubated     Objective:     Vital Signs (Most Recent):  Temp: 98.2 °F (36.8 °C) (07/15/18 0300)  Pulse: 87 (07/15/18 0853)  Resp: 20 (07/15/18 0853)  BP: (!) 164/71 (07/15/18 0600)  SpO2: 99 % (07/15/18 0853) Vital Signs (24h Range):  Temp:  [98 °F (36.7 °C)-98.6 °F (37 °C)] 98.2 °F (36.8  °C)  Pulse:  [82-99] 87  Resp:  [8-30] 20  SpO2:  [99 %-100 %] 99 %  BP: (128-186)/(51-93) 164/71     Weight: 49 kg (108 lb 0.4 oz)  Body mass index is 21.82 kg/m².     SpO2: 99 %  O2 Device (Oxygen Therapy): ventilator      Intake/Output Summary (Last 24 hours) at 07/15/18 0954  Last data filed at 07/15/18 0600   Gross per 24 hour   Intake             1130 ml   Output                0 ml   Net             1130 ml       Lines/Drains/Airways     Drain                 Hemodialysis AV Fistula Left upper arm -- days         NG/OG Tube 07/11/18 1811 orogastric 18 Fr. Left mouth 3 days          Airway                 Airway - Non-Surgical 07/11/18 1426 Endotracheal Tube 3 days          Pressure Ulcer                 Pressure Injury 07/12/18 Left Heel Deep tissue injury 3 days         Pressure Injury 07/12/18 Right Heel Deep tissue injury 3 days          Peripheral Intravenous Line                 Peripheral IV - Single Lumen 07/11/18 1240 Right Forearm 3 days         Peripheral IV - Single Lumen 07/11/18 1438 Right  3 days                Physical Exam   Constitutional: She is oriented to person, place, and time. She appears well-developed and well-nourished. No distress.   HENT:   Head: Normocephalic and atraumatic.   Nose: Nose normal.   Mouth/Throat: Oropharynx is clear and moist.   Eyes: Conjunctivae and EOM are normal. No scleral icterus.   Neck: Normal range of motion. Neck supple. No JVD present. No thyromegaly present.   Cardiovascular: Normal rate, regular rhythm, S1 normal and S2 normal.  Exam reveals no gallop, no S3, no S4 and no friction rub.    Murmur heard.  Pulmonary/Chest: Effort normal and breath sounds normal. No stridor. No respiratory distress. She has no wheezes. She has no rales. She exhibits no tenderness.   Abdominal: Soft. Bowel sounds are normal. She exhibits no distension and no mass. There is no tenderness. There is no rebound.   Genitourinary:   Genitourinary Comments: Deferred    Musculoskeletal: Normal range of motion. She exhibits no edema, tenderness or deformity.   Lymphadenopathy:     She has no cervical adenopathy.   Neurological: She is alert and oriented to person, place, and time. She exhibits normal muscle tone. Coordination normal.   Skin: Skin is warm and dry. No rash noted. She is not diaphoretic. No erythema. No pallor.   Psychiatric: She has a normal mood and affect. Her behavior is normal. Judgment and thought content normal.   Nursing note and vitals reviewed.      Significant Labs:   All pertinent lab results from the last 24 hours have been reviewed. and   Recent Lab Results       07/15/18  0504 07/15/18  0504 07/15/18  0405 07/14/18  2346 07/14/18  1845      Albumin   2.3(L)       Alkaline Phosphatase   145(H)       Allens Test  Pass        ALT   16       Anion Gap   15       Aniso   Moderate       AST   16       Baso #   0.03       Basophil%   0.2       Total Bilirubin   0.8  Comment:  For infants and newborns, interpretation of results should be based  on gestational age, weight and in agreement with clinical  observations.  Premature Infant recommended reference ranges:  Up to 24 hours.............<8.0 mg/dL  Up to 48 hours............<12.0 mg/dL  3-5 days..................<15.0 mg/dL  6-29 days.................<15.0 mg/dL         Site  RR        BUN, Bld   68(H)       Calcium   9.8       Chloride   99       CO2   25       Creatinine   4.6(H)       [employer]  Adult Vent        Differential Method   Automated       eGFR if    11(A)       eGFR if non    9  Comment:  Calculation used to obtain the estimated glomerular filtration  rate (eGFR) is the CKD-EPI equation.   (A)       Eos #   0.3       Eosinophil%   2.0       FiO2  35        Glucose   154(H)       Gran # (ANC)   12.9(H)       Gran%   81.9(H)       Hematocrit   33.0(L)       Hemoglobin   9.9(L)       Hypo   Occasional       Lymph #   1.4       Lymph%   8.7(L)       Magnesium   2.4        MCH   24.1(L)       MCHC   30.0(L)       MCV   80(L)       Mode  AC/PRVC        Mono #   1.2(H)       Mono%   7.6       MPV   SEE COMMENT  Comment:  Result not available.       Ovalocytes   Occasional       PEEP  8        Phosphorus   3.6       Platelet Estimate   Decreased(A)       Platelets   89(L)       POC BE  9        POC HCO3  33.1(H)        POC PCO2  47.6(H)        POC PH  7.449        POC PO2  94        POC SATURATED O2  98        POCT Glucose 161(H)   139(H) 140(H)     Poik   Slight       Poly   Occasional       Potassium   4.0       Total Protein   6.5       Rate  16        RBC   4.11       RDW   19.9(H)       Sample  ARTERIAL        Schistocytes   Present       Sodium   139       Spherocytes   Occasional       Stomatocytes   Present       Target Cells   Occasional       Tear Drop Cells   Occasional       Vt  350        WBC   15.84(H)                   07/14/18  1229      Albumin      Alkaline Phosphatase      Allens Test      ALT      Anion Gap      Aniso      AST      Baso #      Basophil%      Total Bilirubin      Site      BUN, Bld      Calcium      Chloride      CO2      Creatinine      [employer]      Differential Method      eGFR if       eGFR if non       Eos #      Eosinophil%      FiO2      Glucose      Gran # (ANC)      Gran%      Hematocrit      Hemoglobin      Hypo      Lymph #      Lymph%      Magnesium      MCH      MCHC      MCV      Mode      Mono #      Mono%      MPV      Ovalocytes      PEEP      Phosphorus      Platelet Estimate      Platelets      POC BE      POC HCO3      POC PCO2      POC PH      POC PO2      POC SATURATED O2      POCT Glucose 162(H)     Poik      Poly      Potassium      Total Protein      Rate      RBC      RDW      Sample      Schistocytes      Sodium      Spherocytes      Stomatocytes      Target Cells      Tear Drop Cells      Vt      WBC            Significant Imaging: Echocardiogram:   2D echo with color flow doppler:   Results  for orders placed or performed during the hospital encounter of 07/11/18   2D echo with color flow doppler   Result Value Ref Range    EF 60 55 - 65    Aortic Valve Stenosis MODERATE (A)     Est. PA Systolic Pressure 59.09 (A)     Mitral Valve Mobility MILDLY RESTRICTED     Tricuspid Valve Regurgitation MILD TO MODERATE     and X-Ray: CXR: X-Ray Chest 1 View (CXR):   Results for orders placed or performed during the hospital encounter of 07/11/18   X-Ray Chest 1 View    Narrative    EXAMINATION:  XR CHEST 1 VIEW    CLINICAL HISTORY:  PNA;    TECHNIQUE:  Single frontal view of the chest was performed.    COMPARISON:  07/14/2018    FINDINGS:  Patchy airspace disease in the perihilar region on the left side with evidence of air bronchograms.  Differential considerations would include ARDS/pulmonary consolidation.  Endotracheal tube in good position at the T4-T5 level.  NG tube is courses through the films.  The tip is not on the films.      Impression    Persistent airspace disease with air bronchograms identified on the left side.  Cardiomegaly.  Possible worsening perihilar infiltrates when compared to 07/14/2018.      Electronically signed by: Benedict Reich MD  Date:    07/15/2018  Time:    08:15     Assessment and Plan:     Brief HPI: no significant changes; family meeting later to decide    Severe sepsis    Cont tx per ICU team        Acute respiratory failure    Cont vent per ICU, wean as tolerated         Cardiac arrest    CTA neg for PE  Pt also has PNA on CXR leading to hypoxic arrest  Cont care per ICU         Elevated troponin    In setting of PEA cardiac arrest  Trend enzymes  2D ECHO without WMA    No further workup until neuro status improves        ESRD on hemodialysis    Cont HD per nephrology           WILL SEE PRN; no further cardiac studies planned    VTE Risk Mitigation         Ordered     heparin (porcine) injection 5,000 Units  Every 8 hours      07/11/18 9111      Critical Care Time:  35  minutes  Critical secondary to Patient has a condition that poses threat to life and bodily function:    cardiac arrest, sepsis, elevated troponin  Critical care was time spent personally by me on the following activities: development of treatment plan with patient or surrogate and bedside caregivers, discussions with consultants, evaluation of patient's response to treatment, examination of patient, ordering and performing treatments and interventions, ordering and review of laboratory studies, ordering and review of cardiac and radiographic studies, telemetry and re-evaluation of patient's condition. This critical care time did not overlap with that of any other provider or involve time for any procedures.    Manny Avina Md, MD  Cardiology  [facility]

## 2018-07-15 NOTE — PROGRESS NOTES
[facility] Hospital Medicine  Progress Note    Patient Name: Talia Mcfarland  MRN: [id number]  Patient Class: IP- Inpatient   Admission Date: 7/11/2018  Length of Stay: 4 days  Attending Physician: Zayda England MD  Primary Care Provider: Tank Castro MD        Subjective:     Principal Problem:HAP (hospital-acquired pneumonia)    HPI:  Talia Mcfarland is a 66 y.o. female patient with PMHx of ESRD on HD, DM2, HTN, PVD, who presented to the ER from dialysis center with alert mental status . At dialysis center patient was receiving  First dose of vancomycin  For possible infection . She was confused so was transferred to ed . History obtained from electronic records no family bedside . 13 30 pm patient was seen by ed physician with confusion but awake . Later around 14 25 she was found not responsive bradycardiac and show breathing weak pulse was moved to ed room 4 from room 14 . Was intubated and after that was found to have no pulse(pea) cpr was started with two rounds , two epi,one atropine / calcium and bicarb  . After ROSc ekg done was with no acute changes .  . Not following commands but randomly moving extremities and opening eyes . Stat echo was done which showed right heart strain with possibility of PE . So cta was ordered . Also started on broad spectrum antibotic culture drawn     Hospital Course:  7/12  Patient seen and examined today .she is only fentanyl drip . no improvement in mental status after cardiac arrest . Gram negative bactermia . Continue to be ventilator   7/13  Patient has ACINETOBACTER SPECIES in blood and e coli in urine .infectious disease was informed recommended Unasyn and vancomycin as per pharmacy  . Neurology consulted for mental status after cardiac arrest   7/14  Patient was seen and examined today. Discuss plan of care with family . Minimal brain stem reflexes . No significant improvement after cardiac arrest -hypoxic brain injury . Started  ciprofloxacin  For e coli.   7/15  Waiting on family decision . Patient no improvement in mental status . She had seizure like received ativan as per e icu last night . Repetetive eyelid blinking can be related hypoxic brain injury after cardiac arrest         Review of Systems   Unable to perform ROS: Intubated     Objective:     Vital Signs (Most Recent):  Temp: 98.2 °F (36.8 °C) (07/15/18 0300)  Pulse: 87 (07/15/18 0853)  Resp: 20 (07/15/18 0853)  BP: (!) 164/71 (07/15/18 0600)  SpO2: 99 % (07/15/18 0853) Vital Signs (24h Range):  Temp:  [98 °F (36.7 °C)-98.6 °F (37 °C)] 98.2 °F (36.8 °C)  Pulse:  [82-99] 87  Resp:  [8-30] 20  SpO2:  [99 %-100 %] 99 %  BP: (128-186)/(51-93) 164/71     Weight: 49 kg (108 lb 0.4 oz)  Body mass index is 21.82 kg/m².    Intake/Output Summary (Last 24 hours) at 07/15/18 1032  Last data filed at 07/15/18 0600   Gross per 24 hour   Intake             1105 ml   Output                0 ml   Net             1105 ml      Physical Exam   Constitutional:   Ill appearing, frail   HENT:   ETT +    Eyes:   Pupils react some according to the neurology examination   Neck: Neck supple.   Cardiovascular:   Tachycardic   Pulmonary/Chest: Effort normal. No respiratory distress.   + ronchi bilaterally    Abdominal: Soft. There is no tenderness.   Musculoskeletal: She exhibits no edema.   Neurological:   Unresponsive , not moving extremities      Skin: Skin is warm.   Nursing note and vitals reviewed.      Significant Labs: All pertinent labs within the past 24 hours have been reviewed.    Significant Imaging: I have reviewed all pertinent imaging results/findings within the past 24 hours.    Assessment/Plan:      * HAP (hospital-acquired pneumonia)    -dialysis patient with multilobar pneumonia  - continue with broad spectrum antibotics   -follow up on cultures   - continue with ventilator support   7/12  Continue zosyn and vanco   Gram negative rods bactermia   7/13  Continue with unasyn    7/14  Continue with unasyn/ciprofloxcin         Encephalopathy    Hypoxic encephalopathy vs metabolic in setting sepsis  Continue antibotics   Neurology following           Acute cystitis    Continue with ciprofloxcin   Urine culture e coli           Deep tissue injury              DM type 2 (diabetes mellitus, type 2)    Continue with iss   And follow hba1c           Severe sepsis    - multilobar pneumonia on broad spectrum antibotics   - also possible oste in toe will order mri once patient stable  - continue with antibotics and follow up on cultures   7/12  Continue with zosyn and vancomycin  Blood culture gram negative rods         Acute respiratory failure    -acute hyper capneic and hypoxic respiratory failure   - in setting of multilobar pneumonia   - continue with broad spectrum antibotics and follow up on cultures   - pe rule out with cta  7/15  Pneumonia continue with antibotics         Cardiac arrest    -initial rhythm as per ed notes PEA  - two rounds of cpr with rosc   - echo with right heart strain and elevated PA and increase central vein pressure pe was rule out cta negative   - respiratory arrest in setting of pneumonia  7/12  No improvement in mental status   Will continue with vent support   7/13  No improvement in mental status neurology on board   7/15  Waiting on family recommendation         ESRD on hemodialysis    -nephrology on board   - case discussed with  nephbertin   7/12  Nephrology on board currently getting dialysis   7/14  On dialysis         Type II or unspecified type diabetes mellitus with renal manifestations, not stated as uncontrolled(250.40)                VTE Risk Mitigation         Ordered     heparin (porcine) injection 5,000 Units  Every 8 hours      07/11/18 9864          Critical care time spent on the evaluation and treatment of severe organ dysfunction, review of pertinent labs and imaging studies, discussions with consulting providers and discussions with  patient/family: 40 minutes.    Zayda England MD  Department of Hospital Medicine   [facility]

## 2018-07-15 NOTE — ASSESSMENT & PLAN NOTE
Continue ventilatory support .  Critical care follow up    07/13- will wean off ventilatory support if feasible    07/14- family is discussing end of life care-will follow up with them about withdrawal of care.  Continue ventilatory support.

## 2018-07-15 NOTE — ASSESSMENT & PLAN NOTE
Hypoxic encephalopathy vs metabolic in setting sepsis  Continue antibotics   Neurology following

## 2018-07-15 NOTE — SUBJECTIVE & OBJECTIVE
Interval History: 68 year old woman with resp failure-  Urine culture- E coli, respiratory/blood culture-Acinetobacter    Review of Systems   Unable to perform ROS: Intubated     Objective:     Vital Signs (Most Recent):  Temp: 98.2 °F (36.8 °C) (07/15/18 0300)  Pulse: 89 (07/15/18 0345)  Resp: 19 (07/15/18 0345)  BP: (!) 155/67 (07/15/18 0330)  SpO2: 100 % (07/15/18 0345) Vital Signs (24h Range):  Temp:  [98 °F (36.7 °C)-98.9 °F (37.2 °C)] 98.2 °F (36.8 °C)  Pulse:  [83-99] 89  Resp:  [8-27] 19  SpO2:  [99 %-100 %] 100 %  BP: (128-186)/(51-96) 155/67     Weight: 52 kg (114 lb 10.2 oz)  Body mass index is 23.15 kg/m².    Estimated Creatinine Clearance: 11.3 mL/min (A) (based on SCr of 3.9 mg/dL (H)).    Physical Exam   Constitutional:   Ill appearing, frail   HENT:   ETT +    Eyes:   Sluggish pupillary reaction to light   Neck: Neck supple.   Cardiovascular:   Tachy at 87    Pulmonary/Chest: Effort normal. No respiratory distress.   + ronchi bilaterally    Abdominal: Soft. There is no tenderness.   Musculoskeletal: She exhibits no edema.   Neurological:   Unresponsive , not moving extremities    Skin: Skin is warm.   Nursing note and vitals reviewed.      Significant Labs:   Blood Culture:   Recent Labs  Lab 07/11/18  1220 07/11/18  1240   LABBLOO Gram stain aer bottle: Gram negative rods   Results called to and read back by: Robert Staples RN 07/12/2018  06:20  ACINETOBACTER SPECIESOrganism further identified as Acinetobacter pittiiFor susceptibility see order #[id number] Gram stain aer bottle: Gram negative rods   Results called to and read back by: Robert Staples RN 07/12/2018  06:20  ACINETOBACTER SPECIESOrganism further identified as Acinetobacter pittiiSusceptibility pending     BMP:   Recent Labs  Lab 07/14/18  0405   *      K 4.2   CL 99   CO2 26   BUN 47*   CREATININE 3.9*   CALCIUM 10.0   MG 2.2     CBC:   Recent Labs  Lab 07/14/18  0405   WBC 16.31*   HGB 10.1*   HCT 32.7*   *      Respiratory Culture:   Recent Labs  Lab 07/11/18  2105   GSRESP <10 epithelial cells per low power field.  Rare WBC's  Rare budding yeast   RESPIRATORYC ACINETOBACTER SPECIESFewSusceptibility pendingFurther identified as Acinetobacter pittii     All pertinent labs within the past 24 hours have been reviewed.    Significant Imaging: I have reviewed all pertinent imaging results/findings within the past 24 hours.

## 2018-07-16 LAB
ALBUMIN SERPL BCP-MCNC: 2.3 G/DL
ALLENS TEST: ABNORMAL
ALP SERPL-CCNC: 180 U/L
ALT SERPL W/O P-5'-P-CCNC: 13 U/L
ANION GAP SERPL CALC-SCNC: 16 MMOL/L
ANISOCYTOSIS BLD QL SMEAR: SLIGHT
AST SERPL-CCNC: 20 U/L
BASOPHILS # BLD AUTO: 0.06 K/UL
BASOPHILS NFR BLD: 0.6 %
BILIRUB SERPL-MCNC: 0.8 MG/DL
BUN SERPL-MCNC: 85 MG/DL
CALCIUM SERPL-MCNC: 9.8 MG/DL
CHLORIDE SERPL-SCNC: 99 MMOL/L
CO2 SERPL-SCNC: 23 MMOL/L
CREAT SERPL-MCNC: 5.3 MG/DL
DACRYOCYTES BLD QL SMEAR: ABNORMAL
DELSYS: ABNORMAL
DIFFERENTIAL METHOD: ABNORMAL
EOSINOPHIL # BLD AUTO: 0.3 K/UL
EOSINOPHIL NFR BLD: 2.6 %
ERYTHROCYTE [DISTWIDTH] IN BLOOD BY AUTOMATED COUNT: 19.7 %
ERYTHROCYTE [SEDIMENTATION RATE] IN BLOOD BY WESTERGREN METHOD: 16 MM/H
EST. GFR  (AFRICAN AMERICAN): 9 ML/MIN/1.73 M^2
EST. GFR  (NON AFRICAN AMERICAN): 8 ML/MIN/1.73 M^2
FIO2: 35
GIANT PLATELETS BLD QL SMEAR: PRESENT
GLUCOSE SERPL-MCNC: 173 MG/DL
HCO3 UR-SCNC: 26.8 MMOL/L (ref 24–28)
HCT VFR BLD AUTO: 33 %
HGB BLD-MCNC: 9.8 G/DL
HYPOCHROMIA BLD QL SMEAR: ABNORMAL
LYMPHOCYTES # BLD AUTO: 1.6 K/UL
LYMPHOCYTES NFR BLD: 14.9 %
MAGNESIUM SERPL-MCNC: 2.5 MG/DL
MCH RBC QN AUTO: 23.8 PG
MCHC RBC AUTO-ENTMCNC: 29.7 G/DL
MCV RBC AUTO: 80 FL
MODE: ABNORMAL
MONOCYTES # BLD AUTO: 1.3 K/UL
MONOCYTES NFR BLD: 11.9 %
NEUTROPHILS # BLD AUTO: 7.5 K/UL
NEUTROPHILS NFR BLD: 73.8 %
OVALOCYTES BLD QL SMEAR: ABNORMAL
PCO2 BLDA: 38.3 MMHG (ref 35–45)
PEEP: 8
PH SMN: 7.45 [PH] (ref 7.35–7.45)
PHOSPHATE SERPL-MCNC: 3.6 MG/DL
PLATELET # BLD AUTO: 93 K/UL
PLATELET BLD QL SMEAR: ABNORMAL
PMV BLD AUTO: ABNORMAL FL
PO2 BLDA: 88 MMHG (ref 80–100)
POC BE: 3 MMOL/L
POC SATURATED O2: 97 % (ref 95–100)
POCT GLUCOSE: 177 MG/DL (ref 70–110)
POCT GLUCOSE: 185 MG/DL (ref 70–110)
POCT GLUCOSE: 196 MG/DL (ref 70–110)
POIKILOCYTOSIS BLD QL SMEAR: SLIGHT
POLYCHROMASIA BLD QL SMEAR: ABNORMAL
POTASSIUM SERPL-SCNC: 4.2 MMOL/L
PROT SERPL-MCNC: 6.6 G/DL
RBC # BLD AUTO: 4.11 M/UL
SAMPLE: ABNORMAL
SCHISTOCYTES BLD QL SMEAR: PRESENT
SITE: ABNORMAL
SODIUM SERPL-SCNC: 138 MMOL/L
SPHEROCYTES BLD QL SMEAR: ABNORMAL
STOMATOCYTES BLD QL SMEAR: PRESENT
TARGETS BLD QL SMEAR: ABNORMAL
VT: 350
WBC # BLD AUTO: 10.74 K/UL

## 2018-07-16 PROCEDURE — 94640 AIRWAY INHALATION TREATMENT: CPT

## 2018-07-16 PROCEDURE — 25000003 PHARM REV CODE 250: Performed by: INTERNAL MEDICINE

## 2018-07-16 PROCEDURE — 25000003 PHARM REV CODE 250: Performed by: NURSE PRACTITIONER

## 2018-07-16 PROCEDURE — 82803 BLOOD GASES ANY COMBINATION: CPT

## 2018-07-16 PROCEDURE — 99233 SBSQ HOSP IP/OBS HIGH 50: CPT | Mod: ,,, | Performed by: PSYCHIATRY & NEUROLOGY

## 2018-07-16 PROCEDURE — 99900026 HC AIRWAY MAINTENANCE (STAT)

## 2018-07-16 PROCEDURE — 97803 MED NUTRITION INDIV SUBSEQ: CPT

## 2018-07-16 PROCEDURE — 94003 VENT MGMT INPAT SUBQ DAY: CPT

## 2018-07-16 PROCEDURE — 99900035 HC TECH TIME PER 15 MIN (STAT)

## 2018-07-16 PROCEDURE — 20000000 HC ICU ROOM

## 2018-07-16 PROCEDURE — 63600175 PHARM REV CODE 636 W HCPCS: Performed by: INTERNAL MEDICINE

## 2018-07-16 PROCEDURE — 36600 WITHDRAWAL OF ARTERIAL BLOOD: CPT

## 2018-07-16 PROCEDURE — 84100 ASSAY OF PHOSPHORUS: CPT

## 2018-07-16 PROCEDURE — 63600175 PHARM REV CODE 636 W HCPCS: Performed by: NURSE PRACTITIONER

## 2018-07-16 PROCEDURE — 83735 ASSAY OF MAGNESIUM: CPT

## 2018-07-16 PROCEDURE — 87040 BLOOD CULTURE FOR BACTERIA: CPT

## 2018-07-16 PROCEDURE — 99291 CRITICAL CARE FIRST HOUR: CPT | Mod: ,,, | Performed by: NURSE PRACTITIONER

## 2018-07-16 PROCEDURE — 99232 SBSQ HOSP IP/OBS MODERATE 35: CPT | Mod: ,,, | Performed by: INTERNAL MEDICINE

## 2018-07-16 PROCEDURE — 80053 COMPREHEN METABOLIC PANEL: CPT

## 2018-07-16 PROCEDURE — 25000242 PHARM REV CODE 250 ALT 637 W/ HCPCS: Performed by: NURSE PRACTITIONER

## 2018-07-16 PROCEDURE — 85025 COMPLETE CBC W/AUTO DIFF WBC: CPT

## 2018-07-16 PROCEDURE — 63600175 PHARM REV CODE 636 W HCPCS: Performed by: PSYCHIATRY & NEUROLOGY

## 2018-07-16 PROCEDURE — 36415 COLL VENOUS BLD VENIPUNCTURE: CPT

## 2018-07-16 PROCEDURE — S0028 INJECTION, FAMOTIDINE, 20 MG: HCPCS | Performed by: INTERNAL MEDICINE

## 2018-07-16 RX ORDER — CIPROFLOXACIN 2 MG/ML
400 INJECTION, SOLUTION INTRAVENOUS
Status: DISCONTINUED | OUTPATIENT
Start: 2018-07-16 | End: 2018-07-18

## 2018-07-16 RX ORDER — METOPROLOL TARTRATE 25 MG/1
25 TABLET, FILM COATED ORAL 2 TIMES DAILY
Status: DISCONTINUED | OUTPATIENT
Start: 2018-07-16 | End: 2018-07-17

## 2018-07-16 RX ADMIN — SODIUM CHLORIDE 3 G: 9 INJECTION, SOLUTION INTRAVENOUS at 06:07

## 2018-07-16 RX ADMIN — IPRATROPIUM BROMIDE AND ALBUTEROL SULFATE 3 ML: .5; 3 SOLUTION RESPIRATORY (INHALATION) at 01:07

## 2018-07-16 RX ADMIN — LEVETIRACETAM 500 MG: 5 INJECTION INTRAVENOUS at 04:07

## 2018-07-16 RX ADMIN — METOPROLOL TARTRATE 25 MG: 25 TABLET, FILM COATED ORAL at 12:07

## 2018-07-16 RX ADMIN — IPRATROPIUM BROMIDE AND ALBUTEROL SULFATE 3 ML: .5; 3 SOLUTION RESPIRATORY (INHALATION) at 08:07

## 2018-07-16 RX ADMIN — CIPROFLOXACIN 400 MG: 2 INJECTION, SOLUTION INTRAVENOUS at 06:07

## 2018-07-16 RX ADMIN — LEVETIRACETAM 500 MG: 5 INJECTION INTRAVENOUS at 09:07

## 2018-07-16 RX ADMIN — METOPROLOL TARTRATE 25 MG: 25 TABLET, FILM COATED ORAL at 09:07

## 2018-07-16 RX ADMIN — INSULIN DETEMIR 10 UNITS: 100 INJECTION, SOLUTION SUBCUTANEOUS at 12:07

## 2018-07-16 RX ADMIN — POLYETHYLENE GLYCOL (3350) 17 G: 17 POWDER, FOR SOLUTION ORAL at 08:07

## 2018-07-16 RX ADMIN — FAMOTIDINE 20 MG: 10 INJECTION INTRAVENOUS at 08:07

## 2018-07-16 RX ADMIN — HEPARIN SODIUM 5000 UNITS: 5000 INJECTION, SOLUTION INTRAVENOUS; SUBCUTANEOUS at 02:07

## 2018-07-16 RX ADMIN — HEPARIN SODIUM 5000 UNITS: 5000 INJECTION, SOLUTION INTRAVENOUS; SUBCUTANEOUS at 05:07

## 2018-07-16 RX ADMIN — CHLORHEXIDINE GLUCONATE 15 ML: 1.2 RINSE ORAL at 09:07

## 2018-07-16 RX ADMIN — CIPROFLOXACIN 400 MG: 2 INJECTION, SOLUTION INTRAVENOUS at 03:07

## 2018-07-16 RX ADMIN — CHLORHEXIDINE GLUCONATE 15 ML: 1.2 RINSE ORAL at 08:07

## 2018-07-16 RX ADMIN — HEPARIN SODIUM 5000 UNITS: 5000 INJECTION, SOLUTION INTRAVENOUS; SUBCUTANEOUS at 09:07

## 2018-07-16 RX ADMIN — IPRATROPIUM BROMIDE AND ALBUTEROL SULFATE 3 ML: .5; 3 SOLUTION RESPIRATORY (INHALATION) at 07:07

## 2018-07-16 RX ADMIN — LEVETIRACETAM 500 MG: 5 INJECTION INTRAVENOUS at 08:07

## 2018-07-16 RX ADMIN — ACETYLCYSTEINE 4 ML: 100 SOLUTION ORAL; RESPIRATORY (INHALATION) at 08:07

## 2018-07-16 NOTE — ASSESSMENT & PLAN NOTE
Sputum + Acinetobacter  Cont Unasyn, Duoneb, Mucoyst, CPT and OET suctioning  CXR daily  VAP prophylaxis

## 2018-07-16 NOTE — PROGRESS NOTES
Pharmacist Renal Dose Adjustment Note    Talia Mcfarland is a 68 y.o. female being treated with the medication ciprofloxacin.     Patient Data:  Vital Signs (Most Recent):  Temp: 99.6 °F (37.6 °C) (07/16/18 0715)  Pulse: 93 (07/16/18 0818)  Resp: (!) 21 (07/16/18 0818)  BP: 135/60 (07/16/18 0730)  SpO2: 100 % (07/16/18 0818)   Vital Signs (72h Range):  Temp:  [98 °F (36.7 °C)-100.3 °F (37.9 °C)]   Pulse:  []   Resp:  [8-30]   BP: ()/(37-97)   SpO2:  [74 %-100 %]        Recent Labs     Lab 07/14/18  0405 07/15/18  0405 07/16/18  0405   CREATININE 3.9* 4.6* 5.3*     Serum creatinine: 5.3 mg/dL (H) 07/16/18 0405  Estimated creatinine clearance: 8.3 mL/min (A)  ESRD w/ HD.   Currently dialysis is being assessed on a day to day basis but outpatient is on M/W/F schedule.     Will change ciprofloxacin dose to 400 mg every 24 hours.     Pharmacist's Name: Angie Wolfe  Pharmacist's Extension: [phone]

## 2018-07-16 NOTE — PLAN OF CARE
CM called spouse and no answer to the phone.CM wanted to discuss d/c plan and DNR status with spouse.

## 2018-07-16 NOTE — SUBJECTIVE & OBJECTIVE
Interval History:  Patient is still intubated.  Family decision pending.  No hemodialysis today.    Review of patient's allergies indicates:   Allergen Reactions    Tylenol [acetaminophen] Other (See Comments)     Headache     Current Facility-Administered Medications   Medication Frequency    acetylcysteine 100 mg/ml (10%) solution 4 mL BID    albumin human 25% bottle 12.5 g PRN    albuterol-ipratropium 2.5 mg-0.5 mg/3 mL nebulizer solution 3 mL Q6H    ampicillin-sulbactam 3 g in sodium chloride 0.9 % 100 mL IVPB (ready to mix system) Q24H    bisacodyl suppository 10 mg Daily PRN    chlorhexidine 0.12 % solution 15 mL BID    ciprofloxacin (CIPRO)400mg/200ml D5W IVPB 400 mg Q24H    dextrose 50% injection 12.5 g PRN    famotidine (PF) injection 20 mg Daily    glucagon (human recombinant) injection 1 mg PRN    heparin (porcine) injection 5,000 Units Q8H    insulin aspart U-100 pen 0-5 Units Q6H PRN    insulin detemir U-100 pen 10 Units Daily    levETIRAcetam in NaCl (iso-os) IVPB 500 mg Q12H    levETIRAcetam in NaCl (iso-os) IVPB 500 mg Every Mon, Wed, Fri    metoprolol tartrate (LOPRESSOR) tablet 25 mg BID    polyethylene glycol packet 17 g Daily       Objective:     Vital Signs (Most Recent):  Temp: 99.6 °F (37.6 °C) (07/16/18 1400)  Pulse: 87 (07/16/18 1530)  Resp: 20 (07/16/18 1530)  BP: (!) 174/60 (07/16/18 1400)  SpO2: 100 % (07/16/18 1530)  O2 Device (Oxygen Therapy): ventilator (07/16/18 1321) Vital Signs (24h Range):  Temp:  [99 °F (37.2 °C)-100.3 °F (37.9 °C)] 99.6 °F (37.6 °C)  Pulse:  [83-96] 87  Resp:  [10-30] 20  SpO2:  [97 %-100 %] 100 %  BP: (131-174)/(56-97) 174/60     Weight: 52 kg (114 lb 10.2 oz) (07/16/18 0500)  Body mass index is 23.15 kg/m².  Body surface area is 1.47 meters squared.    I/O last 3 completed shifts:  In: 2075 [NG/GT:975; IV Piggyback:1100]  Out: -     Physical Exam   Constitutional: She appears well-developed.  Non-toxic appearance. She has a sickly appearance.  She appears ill. No distress. She is intubated.   HENT:   Head: Normocephalic and atraumatic.   Mouth/Throat: Oropharynx is clear and moist and mucous membranes are normal.   Eyes: EOM and lids are normal.   Pupils sluggish   Neck: Trachea normal. Neck supple. Carotid bruit is not present.   Cardiovascular: Normal rate, regular rhythm and normal heart sounds.    Pulses:       Radial pulses are 2+ on the right side, and 2+ on the left side.        Dorsalis pedis pulses are 1+ on the right side, and 1+ on the left side.   Pulmonary/Chest: Effort normal. No accessory muscle usage. She is intubated. No respiratory distress. She has decreased breath sounds.   Abdominal: Soft. Normal appearance. She exhibits no distension. Bowel sounds are decreased. There is no tenderness.   Musculoskeletal:        Right foot: There is no deformity.        Left foot: There is no deformity.   Mild edema   Feet:   Right Foot:   Skin Integrity: Positive for skin breakdown.   Left Foot:   Skin Integrity: Positive for skin breakdown.   Lymphadenopathy:     She has no cervical adenopathy.   Neurological: She is unresponsive.   Weak corneal reflex and pupils sluggish.  spont opens eyes to stimuli but inconsistent and weak withdrawal of LEs to pain but no withdrawal of UEs to pain. No spont or purposeful movements off any sedation.    Skin: Skin is warm and dry. Capillary refill takes less than 2 seconds. No rash noted. No cyanosis.        Nursing note and vitals reviewed.      Significant Labs:  All labs within the past 24 hours have been reviewed.     Significant Imaging:  Labs: Reviewed

## 2018-07-16 NOTE — PROGRESS NOTES
[facility] -   Nephrology  Progress Note    Patient Name: Talia Mcfarland  MRN: [id number]  Admission Date: 7/11/2018  Hospital Length of Stay: 5 days  Attending Provider: Zayda England MD   Primary Care Physician: Tank Castro MD  Principal Problem:Acute encephalopathy    Subjective:     HPI: Talia Mcfarland is a 78 y old AA woman with h/o HTN, PAD , ESRD on HD ( MWF , [facility] ) , DM-2 , highly noncompliant with outpatient dialysis treatments, patient misses at least 1 treatment every week for outpatient dialysis team, patient went to hemodialysis treatment today, she had some fever and nausea at the dialysis unit, and a set of blood cultures were drawn and patient received a g of vancomycin and was sent to the emergency room, while she was in the ER here patient developed cardiac arrest requiring CPR and was intubated.  Patient started on broad-spectrum antibiotics.  She also has a wound on her left ft, significant peripheral edema noted likely due to nonadherence with dialysis.  CT chest done on admission showed significant left-sided pneumonia.    Interval History:  Patient is still intubated.  Family decision pending.  No hemodialysis today.    Review of patient's allergies indicates:   Allergen Reactions    Tylenol [acetaminophen] Other (See Comments)     Headache     Current Facility-Administered Medications   Medication Frequency    acetylcysteine 100 mg/ml (10%) solution 4 mL BID    albumin human 25% bottle 12.5 g PRN    albuterol-ipratropium 2.5 mg-0.5 mg/3 mL nebulizer solution 3 mL Q6H    ampicillin-sulbactam 3 g in sodium chloride 0.9 % 100 mL IVPB (ready to mix system) Q24H    bisacodyl suppository 10 mg Daily PRN    chlorhexidine 0.12 % solution 15 mL BID    ciprofloxacin (CIPRO)400mg/200ml D5W IVPB 400 mg Q24H    dextrose 50% injection 12.5 g PRN    famotidine (PF) injection 20 mg Daily    glucagon (human recombinant) injection 1 mg PRN    heparin (porcine)  injection 5,000 Units Q8H    insulin aspart U-100 pen 0-5 Units Q6H PRN    insulin detemir U-100 pen 10 Units Daily    levETIRAcetam in NaCl (iso-os) IVPB 500 mg Q12H    levETIRAcetam in NaCl (iso-os) IVPB 500 mg Every Mon, Wed, Fri    metoprolol tartrate (LOPRESSOR) tablet 25 mg BID    polyethylene glycol packet 17 g Daily       Objective:     Vital Signs (Most Recent):  Temp: 99.6 °F (37.6 °C) (07/16/18 1400)  Pulse: 87 (07/16/18 1530)  Resp: 20 (07/16/18 1530)  BP: (!) 174/60 (07/16/18 1400)  SpO2: 100 % (07/16/18 1530)  O2 Device (Oxygen Therapy): ventilator (07/16/18 1321) Vital Signs (24h Range):  Temp:  [99 °F (37.2 °C)-100.3 °F (37.9 °C)] 99.6 °F (37.6 °C)  Pulse:  [83-96] 87  Resp:  [10-30] 20  SpO2:  [97 %-100 %] 100 %  BP: (131-174)/(56-97) 174/60     Weight: 52 kg (114 lb 10.2 oz) (07/16/18 0500)  Body mass index is 23.15 kg/m².  Body surface area is 1.47 meters squared.    I/O last 3 completed shifts:  In: 2075 [NG/GT:975; IV Piggyback:1100]  Out: -     Physical Exam   Constitutional: She appears well-developed.  Non-toxic appearance. She has a sickly appearance. She appears ill. No distress. She is intubated.   HENT:   Head: Normocephalic and atraumatic.   Mouth/Throat: Oropharynx is clear and moist and mucous membranes are normal.   Eyes: EOM and lids are normal.   Pupils sluggish   Neck: Trachea normal. Neck supple. Carotid bruit is not present.   Cardiovascular: Normal rate, regular rhythm and normal heart sounds.    Pulses:       Radial pulses are 2+ on the right side, and 2+ on the left side.        Dorsalis pedis pulses are 1+ on the right side, and 1+ on the left side.   Pulmonary/Chest: Effort normal. No accessory muscle usage. She is intubated. No respiratory distress. She has decreased breath sounds.   Abdominal: Soft. Normal appearance. She exhibits no distension. Bowel sounds are decreased. There is no tenderness.   Musculoskeletal:        Right foot: There is no deformity.         Left foot: There is no deformity.   Mild edema   Feet:   Right Foot:   Skin Integrity: Positive for skin breakdown.   Left Foot:   Skin Integrity: Positive for skin breakdown.   Lymphadenopathy:     She has no cervical adenopathy.   Neurological: She is unresponsive.   Weak corneal reflex and pupils sluggish.  spont opens eyes to stimuli but inconsistent and weak withdrawal of LEs to pain but no withdrawal of UEs to pain. No spont or purposeful movements off any sedation.    Skin: Skin is warm and dry. Capillary refill takes less than 2 seconds. No rash noted. No cyanosis.        Nursing note and vitals reviewed.      Significant Labs:  All labs within the past 24 hours have been reviewed.     Significant Imaging:  Labs: Reviewed    Assessment/Plan:     ESRD on hemodialysis    Meeting with ICU team and  [facility].        Family will make a decision about her code status and her support system.  For now no need for dialysis today.  Will follow daily for dialysis needs.  Poor prognosis discussed with the family  Recommend comfort measures if the family agrees                Thank you for your consult.     Gurvinder Rios MD  Nephrology  [facility] -

## 2018-07-16 NOTE — PLAN OF CARE
Problem: Patient Care Overview  Goal: Plan of Care Review  Outcome: Ongoing (interventions implemented as appropriate)  Patient remains on ventilator at this time. ETT 7.5 remains secure at 21 cm at the lips. No redness or breakdown noted around ETT or commercial tube carvalho. Patient does have a small cut on bottom lip, left side. Nursing aware. Patient tolerated treatment well. Respiratory to follow.

## 2018-07-16 NOTE — ASSESSMENT & PLAN NOTE
-dialysis patient with multilobar pneumonia  - continue with broad spectrum antibotics   -follow up on cultures   - continue with ventilator support   7/12  Continue zosyn and vanco   Gram negative rods bactermia   7/13  Continue with unasyn   7/14  Continue with unasyn/ciprofloxcin   7/15   Acinetobacter in respiratory culture and blood culture acinetobacter continue with ampicillin-sulbactim

## 2018-07-16 NOTE — PLAN OF CARE
Problem: Patient Care Overview  Goal: Plan of Care Review  No family present today. Care continued. Awaiting family decision for code status. Cont to turn q 2hrs per special bed. Nutritional needs maintained.

## 2018-07-16 NOTE — ASSESSMENT & PLAN NOTE
Meeting with ICU team and  [facility].        Family will make a decision about her code status and her support system.  For now no need for dialysis today.  Will follow daily for dialysis needs.  Poor prognosis discussed with the family  Recommend comfort measures if the family agrees

## 2018-07-16 NOTE — PROGRESS NOTES
[facility] -   Critical Care Medicine  Progress Note    Patient Name: Talia Mcfarland  MRN: [id number]  Admission Date: 7/11/2018  Hospital Length of Stay: 5 days  Code Status: Prior  Attending Provider: Zayda England MD  Primary Care Provider: Tank Castro MD   Principal Problem: Acute encephalopathy    Subjective:     HPI:  68-year-old female patient brought by EMS to the emergency department from dialysis after she complained of malaise and had altered mental status after dialysis today.    Hospital/ICU Course:  While in the ER the patient had pulseless electrical activity and she had CPR performed for about 6 min.  Patient was seen and examined in the emergency room.  She was intubated.  Chest x-ray post intubation showed low ET tube ET tube was pulled out.  Repeat chest x-ray showed good position of ET tube.  7/12 Seen and examined. Intubated, no sedation, no pressors. Tmax 100.8. Bld cx GNR. CXR and ABG reviewed.   7/13 patient seen and examined, intubated, not sedated no pressors.  Chest x-ray and ABG reviewed no changes in mental status.  Breathing over the vent.  Not moving extremities unresponsive.  7/14 seen and examined, intubated, no sedation.  Chest x-ray and ABG reviewed.  No overall change in patient's status.  Had dialysis today.  7/15 - No neuro improvement still resting on full vent support with TF.  Family at bedside.   7/16 - Still no neuro change.  Resting on vent and tolerating TF.     Review of Systems   Unable to perform ROS: Intubated       Objective:     Vital Signs (Most Recent):  Temp: 99.6 °F (37.6 °C) (07/16/18 0715)  Pulse: 91 (07/16/18 0947)  Resp: 18 (07/16/18 0947)  BP: 135/60 (07/16/18 0730)  SpO2: 100 % (07/16/18 0947) Vital Signs (24h Range):  Temp:  [98.5 °F (36.9 °C)-100.3 °F (37.9 °C)] 99.6 °F (37.6 °C)  Pulse:  [87-96] 91  Resp:  [10-30] 18  SpO2:  [97 %-100 %] 100 %  BP: (131-171)/(56-97) 135/60     Weight: 52 kg (114 lb 10.2 oz)  Body mass index is  23.15 kg/m².      Intake/Output Summary (Last 24 hours) at 07/16/18 1040  Last data filed at 07/16/18 0701   Gross per 24 hour   Intake             1145 ml   Output                0 ml   Net             1145 ml       Physical Exam   Constitutional: She appears well-developed.  Non-toxic appearance. She has a sickly appearance. She appears ill. No distress. She is intubated.   HENT:   Head: Normocephalic and atraumatic.   Mouth/Throat: Oropharynx is clear and moist and mucous membranes are normal.   Eyes: EOM and lids are normal.   Pupils sluggish   Neck: Trachea normal. Neck supple. Carotid bruit is not present.   Cardiovascular: Normal rate, regular rhythm and normal heart sounds.    Pulses:       Radial pulses are 2+ on the right side, and 2+ on the left side.        Dorsalis pedis pulses are 1+ on the right side, and 1+ on the left side.   Pulmonary/Chest: Effort normal. No accessory muscle usage. She is intubated. No respiratory distress. She has decreased breath sounds.   Abdominal: Soft. Normal appearance. She exhibits no distension. Bowel sounds are decreased. There is no tenderness.   Musculoskeletal:        Right foot: There is no deformity.        Left foot: There is no deformity.   Mild edema   Feet:   Right Foot:   Skin Integrity: Positive for skin breakdown.   Left Foot:   Skin Integrity: Positive for skin breakdown.   Lymphadenopathy:     She has no cervical adenopathy.   Neurological: She is unresponsive.   Weak corneal reflex and pupils sluggish.  spont opens eyes to stimuli but inconsistent and weak withdrawal of LEs to pain but no withdrawal of UEs to pain. No spont or purposeful movements off any sedation.    Skin: Skin is warm and dry. Capillary refill takes less than 2 seconds. No rash noted. No cyanosis.            Vents:  Vent Mode: A/C (07/16/18 0947)  Ventilator Initiated: Yes (07/11/18 1454)  Set Rate: 16 bmp (07/16/18 0947)  Vt Set: 350 mL (07/16/18 0947)  Pressure Support: 0 cmH20  (07/16/18 0947)  PEEP/CPAP: 8 cmH20 (07/16/18 0947)  Oxygen Concentration (%): 35 (07/16/18 0947)  Peak Airway Pressure: 24 cmH2O (07/16/18 0947)  Plateau Pressure: 0 cmH20 (07/16/18 0947)  Total Ve: 6.83 mL (07/16/18 0947)  F/VT Ratio<105 (RSBI): (!) 50.56 (07/16/18 0947)    Lines/Drains/Airways     Drain                 Hemodialysis AV Fistula Left upper arm -- days         NG/OG Tube 07/11/18 1811 orogastric 18 Fr. Left mouth 4 days          Airway                 Airway - Non-Surgical 07/11/18 1426 Endotracheal Tube 4 days          Pressure Ulcer                 Pressure Injury 07/12/18 Left Heel Deep tissue injury 4 days         Pressure Injury 07/12/18 Right Heel Deep tissue injury 4 days          Peripheral Intravenous Line                 Peripheral IV - Single Lumen 07/11/18 1240 Right Forearm 4 days         Peripheral IV - Single Lumen 07/11/18 1438 Right  4 days                Significant Labs:    CBC/Anemia Profile:    Recent Labs  Lab 07/15/18  0405 07/16/18  0405   WBC 15.84* 10.74   HGB 9.9* 9.8*   HCT 33.0* 33.0*   PLT 89* 93*   MCV 80* 80*   RDW 19.9* 19.7*        Chemistries:    Recent Labs  Lab 07/15/18  0405 07/16/18  0405    138   K 4.0 4.2   CL 99 99   CO2 25 23   BUN 68* 85*   CREATININE 4.6* 5.3*   CALCIUM 9.8 9.8   ALBUMIN 2.3* 2.3*   PROT 6.5 6.6   BILITOT 0.8 0.8   ALKPHOS 145* 180*   ALT 16 13   AST 16 20   MG 2.4 2.5   PHOS 3.6 3.6       ABGs:   Recent Labs  Lab 07/16/18  0515   PH 7.454*   PCO2 38.3   HCO3 26.8   POCSATURATED 97   BE 3     All pertinent labs within the past 24 hours have been reviewed.    Significant Imaging:  CXR: I have reviewed all pertinent results/findings within the past 24 hours and my personal findings are:  unchanged bilat infiltrates      Assessment/Plan:     Neuro   * Acute encephalopathy    Hypoxic vs Toxic vs Metabolic or likely multifactorial  Neuro following  No neuro improvement and has not had any sedation   Keppra per Neuro         Psychiatric    Hx of noncompliance with medical treatment, presenting hazards to health    Hx of missing RRT weekly and often stopping RRT short per report        Pulmonary   HAP (hospital-acquired pneumonia)    Sputum + Acinetobacter  Cont Unasyn, Duoneb, Mucoyst, CPT and OET suctioning  CXR daily  VAP prophylaxis          Acute respiratory failure with hypoxia    Vent settings reviewed   Cont full vent support  SBT daily  Plan Trach/PEG this week if family desires to continue aggressive Rx        Cardiac/Vascular   Cardiac arrest    Cont ICU cardiac monitoring        Hypertension    Add Lopressor        Renal/   E. coli urinary tract infection    Cont Cipro        ESRD on hemodialysis    Renal following  RRT today if family wishes to cont aggressive Rx        ID   Severe sepsis    Broad-spectrum antibiotic with IV Zosyn and vancomycin    7/12 GNR septicemia. IV Zosyn.  7/14 bacteremia Acinetobacter pansensitive , E coli in the urine awaiting sensitivity        Septicemia due to Acinetobacter species    Cont Unasyn        Hematology   Thrombocytopenia    No active bleeding  CBC daily  Improved today        Endocrine   Type 2 diabetes mellitus with hyperglycemia, without long-term current use of insulin    SSI and add Detemir        Orthopedic   Deep tissue injury    Turn Q 2 hours and continue wound care and low flow air mattress         Preventive Measures and Monitoring:   Stress Ulcer: Pepcid  Nutrition: TF  Glucose control: SSI and Detemir  Bowel prophylaxis: Miralax  DVT prophylaxis: SQ Hep  Hx CAD on B-Blocker: Lopressor  Head of Bed/Reposition: Elevate HOB and turn Q1-2 hours   Early Mobility: bed rest  SBT: daily  RASS goal: no sedation  Vent Day: #6  OG Day: #6  IVAB Day: #4  Code Status: FULL  Pneumonia Vaccine: UTD     Counseling/Consultation:I have discussed the care of this patient in detail with the bedside nursing staff and Dr. Sky and Dr. England    Critical Care Time: 51 minutes  Critical secondary to  Patient has a condition that poses threat to life and bodily function: Intubated on mech ventilation  Patient has an abrupt change in neurologic status: Acute Encephalopathy      Critical care was time spent personally by me on the following activities: development of treatment plan with patient or surrogate and bedside caregivers, discussions with consultants, evaluation of patient's response to treatment, examination of patient, ordering and performing treatments and interventions, ordering and review of laboratory studies, ordering and review of radiographic studies, pulse oximetry, re-evaluation of patient's condition. This critical care time did not overlap with that of any other provider or involve time for any procedures.     Adolfo Dow NP  Critical Care Medicine  [facility] [place]

## 2018-07-16 NOTE — ASSESSMENT & PLAN NOTE
Vent settings reviewed   Cont full vent support  SBT daily  Plan Trach/PEG this week if family desires to continue aggressive Rx

## 2018-07-16 NOTE — PROGRESS NOTES
Progress note  Neurology      Neurology follow up:  For:  Unresponsiveness     SUBJECTIVE:      HPI:   She was seen by neurologist and thought of metabolic encephalopathy. She is not responding to commands or not doing any purposeful movements. She is intubated and on dialysis.  Patient has multiple medical problems. She was at the dialysis center and post dialysis she came for change in mental status and upon arrival she had a cardiac arrest and had PEA. She had sepsis and other metabolic issues.     Past Medical History:   Diagnosis Date    Anemia     CHF (congestive heart failure)     Diabetes mellitus     DVT (deep venous thrombosis)     Left leg    ESRD on hemodialysis 2 years     Gastroparesis     Hypertension     Type II or unspecified type diabetes mellitus with renal manifestations, not stated as uncontrolled(250.40) 30 years     Urinary tract infection due to extended-spectrum beta lactamase (ESBL)-producing Klebsiella      Past Surgical History:   Procedure Laterality Date     SECTION      CHOLECYSTECTOMY      KUSHAL Bilateral     LBP severe with multiple KUSHAL     ROTATOR CUFF REPAIR      UMBILICAL HERNIA REPAIR      VEIN BYPASS SURGERY Left      Family History   Problem Relation Age of Onset    Kidney disease Neg Hx      Social History   Substance Use Topics    Smoking status: Never Smoker    Smokeless tobacco: Never Used    Alcohol use No       Review of patient's allergies indicates:   Allergen Reactions    Tylenol [acetaminophen] Other (See Comments)     Headache      Patient Active Problem List   Diagnosis    Type II or unspecified type diabetes mellitus with renal manifestations, not stated as uncontrolled(250.40)    ESRD on hemodialysis    Intractable nausea and vomiting    Hx of diabetic gastroparesis    Chest pain    Gastroparesis    Intractable vomiting with nausea    Thrombocytopenia    Infection due to ESBL-producing Escherichia coli    Pneumonia of  right lung due to infectious organism    Hypertension    Type 2 diabetes mellitus    Vomiting, persistent, in adult    Renal failure    Anemia of chronic illness    Epigastric pain    Nausea    Gastroparesis due to DM    Age-related osteoporosis without current pathological fracture    Vitamin D deficiency    Imbalance    Frequent falls    Cardiac arrest    HAP (hospital-acquired pneumonia)    Acute respiratory failure with hypoxia    Severe sepsis    Type 2 diabetes mellitus with hyperglycemia, without long-term current use of insulin    Blister of second toe of left foot    Deep tissue injury    E. coli urinary tract infection    Acute encephalopathy    Septicemia due to Acinetobacter species    Hx of noncompliance with medical treatment, presenting hazards to health         Scheduled Meds:   acetylcysteine 100 mg/ml (10%)  4 mL Nebulization BID    albuterol-ipratropium  3 mL Nebulization Q6H    ampicillin-sulbactim (UNASYN) IVPB  3 g Intravenous Q24H    chlorhexidine  15 mL Mouth/Throat BID    ciprofloxacin (CIPRO)400mg/200ml D5W IVPB  400 mg Intravenous Q24H    famotidine (PF)  20 mg Intravenous Daily    heparin (porcine)  5,000 Units Subcutaneous Q8H    insulin detemir U-100  10 Units Subcutaneous Daily    levetiracetam IVPB  500 mg Intravenous Q12H    levetiracetam IVPB  500 mg Intravenous Every Mon, Wed, Fri    metoprolol tartrate  25 mg Per OG tube BID    polyethylene glycol  17 g Oral Daily     Continuous Infusions:  PRN Meds:albumin human 25%, bisacodyl, dextrose 50%, glucagon (human recombinant), insulin aspart U-100      Review of Systems:   Review of systems not obtained due to patient factors non-verbal..        OBJECTIVE:     Vital Signs (Most Recent)  Temp: 99.6 °F (37.6 °C) (07/16/18 0715)  Pulse: 90 (07/16/18 1321)  Resp: 20 (07/16/18 1321)  BP: (!) 145/59 (07/16/18 1100)  SpO2: 100 % (07/16/18 1321)    Vital Signs Range (Last 24H):  Temp:  [98.8 °F (37.1  °C)-100.3 °F (37.9 °C)]   Pulse:  [84-96]   Resp:  [10-30]   BP: (131-171)/(56-97)   SpO2:  [97 %-100 %]       Physical Exam:  Her eyes are blinking. No tracking. No response to sounds or command. She is moving her extremities on her own. She is responding to pain sensation better on the left side compared to right side. Pupil is reactive to light but very sluggish.         Laboratory:  Lab Results   Component Value Date    WBC 10.74 07/16/2018    HGB 9.8 (L) 07/16/2018    HCT 33.0 (L) 07/16/2018    PLT 93 (L) 07/16/2018    CHOL 176 12/23/2016    TRIG 85 12/23/2016    HDL 50 12/23/2016    ALT 13 07/16/2018    AST 20 07/16/2018     07/16/2018    K 4.2 07/16/2018    CL 99 07/16/2018    CREATININE 5.3 (H) 07/16/2018    BUN 85 (H) 07/16/2018    CO2 23 07/16/2018    TSH 1.353 07/11/2018    INR 1.1 12/22/2016    HGBA1C 5.6 07/11/2018           Diagnostic Results:  C T head: 7/11/2018    1. No acute intracranial findings.  Stable exam.  All CT scans at this facility are performed  using dose modulation techniques as appropriate to performed exam including the following:  automated exposure control; adjustment of mA and/or kV according to the patients size (this includes techniques or standardized protocols for targeted exams where dose is matched to indication/reason for exam: i.e. extremities or head);  iterative reconstruction technique.      ASSESSMENT/PLAN:     Assessment:   1. Encephalopathy: she has poorly responsive to commands. Did not show any purposeful movements.   2. Rule out stroke; CT is negative  For stroke. Right side seemed less responsive . Rule out acute stroke after Cardiac arrest which CT might have missed.    Patient Active Problem List   Diagnosis    Type II or unspecified type diabetes mellitus with renal manifestations, not stated as uncontrolled(250.40)    ESRD on hemodialysis    Intractable nausea and vomiting    Hx of diabetic gastroparesis    Chest pain    Gastroparesis     Intractable vomiting with nausea    Thrombocytopenia    Infection due to ESBL-producing Escherichia coli    Pneumonia of right lung due to infectious organism    Hypertension    Type 2 diabetes mellitus    Vomiting, persistent, in adult    Renal failure    Anemia of chronic illness    Epigastric pain    Nausea    Gastroparesis due to DM    Age-related osteoporosis without current pathological fracture    Vitamin D deficiency    Imbalance    Frequent falls    Cardiac arrest    HAP (hospital-acquired pneumonia)    Acute respiratory failure with hypoxia    Severe sepsis    Type 2 diabetes mellitus with hyperglycemia, without long-term current use of insulin    Blister of second toe of left foot    Deep tissue injury    E. coli urinary tract infection    Acute encephalopathy    Septicemia due to Acinetobacter species    Hx of noncompliance with medical treatment, presenting hazards to health         Plan:  Maintain conservative managements. Will do MRI of the brain.  D/w hospital medicine.

## 2018-07-16 NOTE — SUBJECTIVE & OBJECTIVE
Review of Systems   Unable to perform ROS: Intubated       Objective:     Vital Signs (Most Recent):  Temp: 99.6 °F (37.6 °C) (07/16/18 0715)  Pulse: 91 (07/16/18 0947)  Resp: 18 (07/16/18 0947)  BP: 135/60 (07/16/18 0730)  SpO2: 100 % (07/16/18 0947) Vital Signs (24h Range):  Temp:  [98.5 °F (36.9 °C)-100.3 °F (37.9 °C)] 99.6 °F (37.6 °C)  Pulse:  [87-96] 91  Resp:  [10-30] 18  SpO2:  [97 %-100 %] 100 %  BP: (131-171)/(56-97) 135/60     Weight: 52 kg (114 lb 10.2 oz)  Body mass index is 23.15 kg/m².      Intake/Output Summary (Last 24 hours) at 07/16/18 1040  Last data filed at 07/16/18 0701   Gross per 24 hour   Intake             1145 ml   Output                0 ml   Net             1145 ml       Physical Exam   Constitutional: She appears well-developed.  Non-toxic appearance. She has a sickly appearance. She appears ill. No distress. She is intubated.   HENT:   Head: Normocephalic and atraumatic.   Mouth/Throat: Oropharynx is clear and moist and mucous membranes are normal.   Eyes: EOM and lids are normal.   Pupils sluggish   Neck: Trachea normal. Neck supple. Carotid bruit is not present.   Cardiovascular: Normal rate, regular rhythm and normal heart sounds.    Pulses:       Radial pulses are 2+ on the right side, and 2+ on the left side.        Dorsalis pedis pulses are 1+ on the right side, and 1+ on the left side.   Pulmonary/Chest: Effort normal. No accessory muscle usage. She is intubated. No respiratory distress. She has decreased breath sounds.   Abdominal: Soft. Normal appearance. She exhibits no distension. Bowel sounds are decreased. There is no tenderness.   Musculoskeletal:        Right foot: There is no deformity.        Left foot: There is no deformity.   Mild edema   Feet:   Right Foot:   Skin Integrity: Positive for skin breakdown.   Left Foot:   Skin Integrity: Positive for skin breakdown.   Lymphadenopathy:     She has no cervical adenopathy.   Neurological: She is unresponsive.   Weak  corneal reflex and pupils sluggish.  spont opens eyes to stimuli but inconsistent and weak withdrawal of LEs to pain but no withdrawal of UEs to pain. No spont or purposeful movements off any sedation.    Skin: Skin is warm and dry. Capillary refill takes less than 2 seconds. No rash noted. No cyanosis.            Vents:  Vent Mode: A/C (07/16/18 0947)  Ventilator Initiated: Yes (07/11/18 1454)  Set Rate: 16 bmp (07/16/18 0947)  Vt Set: 350 mL (07/16/18 0947)  Pressure Support: 0 cmH20 (07/16/18 0947)  PEEP/CPAP: 8 cmH20 (07/16/18 0947)  Oxygen Concentration (%): 35 (07/16/18 0947)  Peak Airway Pressure: 24 cmH2O (07/16/18 0947)  Plateau Pressure: 0 cmH20 (07/16/18 0947)  Total Ve: 6.83 mL (07/16/18 0947)  F/VT Ratio<105 (RSBI): (!) 50.56 (07/16/18 0947)    Lines/Drains/Airways     Drain                 Hemodialysis AV Fistula Left upper arm -- days         NG/OG Tube 07/11/18 1811 orogastric 18 Fr. Left mouth 4 days          Airway                 Airway - Non-Surgical 07/11/18 1426 Endotracheal Tube 4 days          Pressure Ulcer                 Pressure Injury 07/12/18 Left Heel Deep tissue injury 4 days         Pressure Injury 07/12/18 Right Heel Deep tissue injury 4 days          Peripheral Intravenous Line                 Peripheral IV - Single Lumen 07/11/18 1240 Right Forearm 4 days         Peripheral IV - Single Lumen 07/11/18 1438 Right  4 days                Significant Labs:    CBC/Anemia Profile:    Recent Labs  Lab 07/15/18  0405 07/16/18  0405   WBC 15.84* 10.74   HGB 9.9* 9.8*   HCT 33.0* 33.0*   PLT 89* 93*   MCV 80* 80*   RDW 19.9* 19.7*        Chemistries:    Recent Labs  Lab 07/15/18  0405 07/16/18  0405    138   K 4.0 4.2   CL 99 99   CO2 25 23   BUN 68* 85*   CREATININE 4.6* 5.3*   CALCIUM 9.8 9.8   ALBUMIN 2.3* 2.3*   PROT 6.5 6.6   BILITOT 0.8 0.8   ALKPHOS 145* 180*   ALT 16 13   AST 16 20   MG 2.4 2.5   PHOS 3.6 3.6       ABGs:   Recent Labs  Lab 07/16/18  0515   PH 7.454*   PCO2 38.3    HCO3 26.8   POCSATURATED 97   BE 3     All pertinent labs within the past 24 hours have been reviewed.    Significant Imaging:  CXR: I have reviewed all pertinent results/findings within the past 24 hours and my personal findings are:  unchanged bilat infiltrates

## 2018-07-16 NOTE — PHYSICIAN QUERY
"PT Name: Talia Mcfarland  MR #: [id number]     Physician Query Form - Etiology of Condition Clarification      CDS/: Jolene Aiken RN CDI        Contact information: [email or web address]  This form is a permanent document in the medical record.     Query Date: July 16, 2018    By submitting this query, we are merely seeking further clarification of documentation.  Please utilize your independent clinical judgment when addressing the question(s) below.     The medical record contains the following:    Findings Supporting Clinical Information Location in Medical Record   Cardiac arrest Cardiac arrest  -initial rhythm as per ed notes PEA  - two rounds of cpr with rosc   - echo with right heart strain and elevated PA and increase central vein pressure pe was rule out cta negative     - respiratory arrest in setting of pneumonia    7/12 - No improvement in mental status   Will continue with vent support   7/13 - No improvement in mental status neurology on board   7/15 -  Waiting on family recommendation      Nursing notes - Pt intubated ( 7/11  249 pm)   - pt lost pulse after intubation( Nursing notes 7/11 250 pm)      Cardiac arrest    CTA neg for PE  Pt also has PNA on CXR leading to hypoxic arrest  Cont care per ICU               Hospital medicine   7/15 1049 am                        Nurses notes 7/11         Manny Avina Md, MD  Cardiology  [facility]  7/12/2018  2:39 PM      Please document your best medical opinion regarding the etiology of "Cardiac arrest" for which the primary focus of treatment is/was directed.         [   ] Cardiac arrest due to Pneumonia and hypoxia.      [   ] Due to procedure of intubation.       [   x] Cardiac arrest due to other, please specify ______PEA___________________.            [    ]  Clinically Undetermined    Please document in your progress notes daily for the duration of treatment, until resolved, and include in your discharge summary.                    "

## 2018-07-16 NOTE — PLAN OF CARE
Problem: Patient Care Overview  Goal: Plan of Care Review  Outcome: Ongoing (interventions implemented as appropriate)  Patient remains intubated, no sedation. No changes to neuro status at this time. VSS. Patient afebrile. Tube feeding continued. Auto rotation bed maintained. Heels elevated. Plan of care discussed with daughter, daughter stated patient's  would like to make a decision about patient's care today. Will continue to monitor.

## 2018-07-16 NOTE — ASSESSMENT & PLAN NOTE
Hypoxic vs Toxic vs Metabolic or likely multifactorial  Neuro following  No neuro improvement and has not had any sedation   Keppra per Neuro

## 2018-07-16 NOTE — PROGRESS NOTES
[facility] -   Adult Nutrition  Progress Note    SUMMARY     Recommendations    Recommendation/Intervention: 1. Continue current TF regimen. 2. Check residuals Q4 hours. Hold if > 500 cc.3. Will continue to monitor.   Goals: Meet > 85 % EEN/EPN while admitted  Nutrition Goal Status: goal met  Communication of RD Recs: discussed on rounds     Reason for Assessment    Reason for Assessment: RD follow up   Dx:  1. Respiratory arrest    2. Altered mental status    3. Swelling    4. Osteomyelitis    5. Chest pain    6. Altered mental status, unspecified altered mental status type    7. Cardiac arrest    8. HAP (hospital-acquired pneumonia)    9. ESRD on hemodialysis    10. Pneumonia    11. Acute encephalopathy    12. Acute respiratory failure with hypoxia    13. Deep tissue injury    14. E. coli urinary tract infection    15. Hx of noncompliance with medical treatment, presenting hazards to health    16. Septicemia due to Acinetobacter species    17. Thrombocytopenia    18. Type 2 diabetes mellitus with hyperglycemia, without long-term current use of insulin    19. Essential hypertension      Past Medical History:   Diagnosis Date    Anemia     CHF (congestive heart failure)     Diabetes mellitus     DVT (deep venous thrombosis)     Left leg    ESRD on hemodialysis 2 years 2012    Gastroparesis     Hypertension     Type II or unspecified type diabetes mellitus with renal manifestations, not stated as uncontrolled(250.40) 30 years     Urinary tract infection due to extended-spectrum beta lactamase (ESBL)-producing Klebsiella        Interdisciplinary Rounds: attended  General Information Comments: Pt currently in ICU, intubated, sedated.  Per ICU staff, pt non-compliant with outpatient dialysis. Reviewed TF recs with NP this AM during rounds, ordered per RD recs.   7.16.18. Pt remains intubated, tolerating TF. Pt received HD on 7/13/18.   Nutrition Discharge Planning: too soon to  "determine    Nutrition Risk Screen    Nutrition Risk Screen: tube feeding or parenteral nutrition    Nutrition/Diet History    Food Preferences: TITO  Do you have any cultural, spiritual, Hoahaoism conflicts, given your current situation?: none (per family)    Anthropometrics    Temp: 99.6 °F (37.6 °C)  Height Method: Estimated  Height: 4' 11" (149.9 cm)  Height (inches): 59 in  Weight Method: Bed Scale  Weight: 52 kg (114 lb 10.2 oz)  Weight (lb): 114.64 lb  Ideal Body Weight (IBW), Female: 95 lb  % Ideal Body Weight, Female (lb): 120.67 lb  BMI (Calculated): 23.2  BMI Grade: 18.5-24.9 - normal       Lab/Procedures/Meds    Pertinent Labs Reviewed: reviewed  BMP  Lab Results   Component Value Date     07/16/2018    K 4.2 07/16/2018    CL 99 07/16/2018    CO2 23 07/16/2018    BUN 85 (H) 07/16/2018    CREATININE 5.3 (H) 07/16/2018    CALCIUM 9.8 07/16/2018    ANIONGAP 16 07/16/2018    ESTGFRAFRICA 9 (A) 07/16/2018    EGFRNONAA 8 (A) 07/16/2018     Lab Results   Component Value Date    CALCIUM 9.8 07/16/2018    PHOS 3.6 07/16/2018     Lab Results   Component Value Date    ALBUMIN 2.3 (L) 07/16/2018   ;    Recent Labs  Lab 07/16/18  0515   POCTGLUCOSE 196*     Lab Results   Component Value Date    HGBA1C 5.6 07/11/2018       Pertinent Medications Reviewed: reviewed    Physical Findings/Assessment    Overall Physical Appearance: on ventilator support  Tubes:  (OG)  Oral/Mouth Cavity: tooth/teeth missing  Skin:  (Burn)    Estimated/Assessed Needs    Weight Used For Calorie Calculations: 52 kg (114 lb 10.2 oz)  Energy Calorie Requirements (kcal): 1320  Energy Need Method: [facility]  Protein Requirements: 62- 81 g  Weight Used For Protein Calculations: 52 kg (114 lb 10.2 oz)     Fluid Need Method: RDA Method (or per MD)  RDA Method (mL): 1320  CHO Requirement: 50 % EEN      Nutrition Prescription Ordered    Current Diet Order: NPO  Current Nutrition Support Formula Ordered:  (Novasource at 25 ml/ hr + 2 pack of " beneprotein BID)    Evaluation of Received Nutrient/Fluid Intake    Enteral Calories (kcal): 1300  Enteral Protein (gm): 78  % Kcal Needs: 98  % Protein Needs: 100     Intake/Output Summary (Last 24 hours) at 07/16/18 1157  Last data filed at 07/16/18 1100   Gross per 24 hour   Intake             1320 ml   Output                0 ml   Net             1320 ml       % Intake of Estimated Energy Needs: 75 - 100 %  % Meal Intake: 75 - 100 %    Nutrition Risk       2xweekly    Assessment and Plan    Nutrition Problem:  Inadequate oral intake      Related to (etiology):   Mechanical ventilation      Signs and Symptoms (as evidenced by):   NPO,on TF      Interventions/Recommendations (treatment strategy):  Please see RD recs above.     Nutrition Diagnosis Status:   Continues         Monitor and Evaluation    Food and Nutrient Intake: energy intake, enteral nutrition intake  Food and Nutrient Adminstration: enteral and parenteral nutrition administration  Anthropometric Measurements: weight  Biochemical Data, Medical Tests and Procedures: electrolyte and renal panel, glucose/endocrine profile  Nutrition-Focused Physical Findings: overall appearance, skin     Nutrition Follow-Up    RD Follow-up?: Yes

## 2018-07-16 NOTE — SUBJECTIVE & OBJECTIVE
Review of Systems   Unable to perform ROS: Intubated     Objective:     Vital Signs (Most Recent):  Temp: 99.6 °F (37.6 °C) (07/16/18 0715)  Pulse: 90 (07/16/18 1321)  Resp: 20 (07/16/18 1321)  BP: (!) 145/59 (07/16/18 1100)  SpO2: 100 % (07/16/18 1321) Vital Signs (24h Range):  Temp:  [98.8 °F (37.1 °C)-100.3 °F (37.9 °C)] 99.6 °F (37.6 °C)  Pulse:  [84-96] 90  Resp:  [10-30] 20  SpO2:  [97 %-100 %] 100 %  BP: (131-171)/(56-97) 145/59     Weight: 52 kg (114 lb 10.2 oz)  Body mass index is 23.15 kg/m².    Intake/Output Summary (Last 24 hours) at 07/16/18 1324  Last data filed at 07/16/18 1100   Gross per 24 hour   Intake             1270 ml   Output                0 ml   Net             1270 ml      Physical Exam   Constitutional: She appears well-developed.  Non-toxic appearance. She has a sickly appearance. She appears ill. No distress. She is intubated.   HENT:   Head: Normocephalic and atraumatic.   Mouth/Throat: Oropharynx is clear and moist and mucous membranes are normal.   Eyes: EOM and lids are normal.   Pupils sluggish   Neck: Trachea normal. Neck supple. Carotid bruit is not present.   Cardiovascular: Normal rate, regular rhythm and normal heart sounds.    Pulses:       Radial pulses are 2+ on the right side, and 2+ on the left side.        Dorsalis pedis pulses are 1+ on the right side, and 1+ on the left side.   Pulmonary/Chest: Effort normal. No accessory muscle usage. She is intubated. No respiratory distress. She has decreased breath sounds.   Abdominal: Soft. Normal appearance. She exhibits no distension. Bowel sounds are decreased. There is no tenderness.   Musculoskeletal:        Right foot: There is no deformity.        Left foot: There is no deformity.   Mild edema   Feet:   Right Foot:   Skin Integrity: Positive for skin breakdown.   Left Foot:   Skin Integrity: Positive for skin breakdown.   Lymphadenopathy:     She has no cervical adenopathy.   Neurological: She is unresponsive.   Weak  corneal reflex and pupils sluggish.  spont opens eyes to stimuli but inconsistent and weak withdrawal of LEs to pain but no withdrawal of UEs to pain. No spont or purposeful movements off any sedation.    Skin: Skin is warm and dry. Capillary refill takes less than 2 seconds. No rash noted. No cyanosis.        Nursing note and vitals reviewed.      Significant Labs: All pertinent labs within the past 24 hours have been reviewed.    Significant Imaging: I have reviewed all pertinent imaging results/findings within the past 24 hours.

## 2018-07-16 NOTE — PLAN OF CARE
Problem: Patient Care Overview  Goal: Plan of Care Review  Outcome: Ongoing (interventions implemented as appropriate)  Recommendations     Recommendation/Intervention: 1. Continue current TF regimen. 2. Check residuals Q4 hours. Hold if > 500 cc.3. Will continue to monitor.   Goals: Meet > 85 % EEN/EPN while admitted  Nutrition Goal Status: goal met  Communication of RD Recs: discussed on rounds

## 2018-07-16 NOTE — PHYSICIAN QUERY
PT Name: Talia Mcfarland  MR #: [id number]    Physician Query Form - Heart  Condition Clarification     CDS/: Jolene Aiken               Contact information:  This form is a permanent document in the medical record.     Query Date: July 16, 2018    By submitting this query, we are merely seeking further clarification of documentation. Please utilize your independent clinical judgment when addressing the question(s) below.    The medical record contains the following   Indicators     Supporting Clinical Findings Location in Medical Record   X BNP >4900 Lab results 7/11   X EF 60-65%    X Radiology findings Left upper lobe consolidation.    Stable cardiomegaly.  Aortic atherosclerosis.  No acute osseous findings demonstrated.    1. There has been interval placement of an NG tube. The tip is not included on the film.  2. There is a moderate amount of alveolar consolidation scattered throughout the left lung. There is prominence of the right hilar region.  This is characteristic of pneumonia and adenopathy.  I recommend radiographic follow-up until complete resolution of the lungs.  3. There is mild cardiomegaly.  4. An endotracheal tube remains in place.   7/11 chest xray      7/12 chest xray   X Echo Results   1 - Normal left ventricular systolic function (EF 60-65%).     2 - Right ventricular enlargement with moderately to severely depressed systolic function.     3 - Biatrial enlargement.     4 - Concentric hypertrophy.     5 - No wall motion abnormalities.     6 - Pulmonary hypertension. The estimated PA systolic pressure is 59 mmHg.     7 - Moderate aortic stenosis, FALLON = 0.91 cm2, AVAi = 0.58 cm2/m2, peak velocity = 3.05 m/s, mean gradient = 25 mmHg.     8 - The mitral valve is moderately sclerotic with mildly restricted leaflet mobility.     9 - Mild to moderate tricuspid regurgitation.     10 - Increased central venous pressure.     11 - RV is enlarged with moderate to severely depressed function concern  "for RV strain due to PE.      "Ascites" documented      "SOB" or "COUGHLIN" documented     X "Hypoxia" documented Acute respiratory failure    -acute hyper capneic and hypoxic respiratory failure   - in setting of multilobar pneumonia   - continue with broad spectrum antibotics and follow up on cultures   - pe rule out with cta     H&P   X Heart Failure documented HX of CHF  H&P   X "Edema" documented significant peripheral edema noted likely due to nonadherence with dialysis.Pt non compliant with dialysis treatment Nephrology note  7/12  1218 pm   X Diuretics/Meds Metoprolol 25 mg 2 times daily 7/16 1100    x Treatment: Dialysis 7/12  529 pm    Other:      Heart failure (HF) can be acute, chronic or both. It is generally further specificed as systolic, diastolic, or combined. Lastly, it is important to identify an underlying etiology if known or suspected.     Common clues to acute exacerbation:  Rapidly progressive symptoms (w/in 2 weeks of presentation), using IV diuretics to treat, using supplemental O2, pulmonary edema on Xray, MI w/in 4 weeks, and/or BNP >500    Systolic Heart Failure: is defined as chart documentation of a left ventricular ejection fraction (LVEF) less than 40%     Diastolic Heart Failure: is defined as a left ventricular ejection fraction (LVEF) greater than 40%   +      Evidence of diastolic dysfunction on echocardiography OR    Right heart catheterization wedge pressure above 12 mm Hg OR    Left heart catheterization left ventricular end diastolic pressure 18 mm Hg or above.    References: *American Heart Association    The clinical guidelines noted below are only system guidelines, and do not replace the providers clinical judgment.     Provider, please specify the diagnosis associated with above clinical findings    [   ] Acute on Chronic Systolic Heart Failure- Pre-existing systolic HF diagnosis.  EF < 40%  and acute HF symptoms documented    [   ] Chronic Systolic Heart Failure - " Pre-existing systolic HF diagnosis.  EF < 40%  without  acute HF symptoms documented    [X   ] Acute on Chronic Diastolic Heart Failure -    Pre-existing diastoic HF diagnosis.  EF > 40%  and acute HF symptoms documented                                   [   ] Chronic Diastolic Heart Failure - Pre-existing diastolic HF diagnosis.  EF > 40%  without  acute HF symptoms documented    [   ] Acute on Chronic Combined Systolic and Diastolic Heart Failure                     [   ] Chronic Combined Systolic and Diastolic Heart Failure    [   ] Clinically Undetermined                          Please document in your progress notes daily for the duration of treatment until resolved and include in your discharge summary.

## 2018-07-16 NOTE — PROGRESS NOTES
[facility] Hospital Medicine  Progress Note    Patient Name: Talia Mcfarland  MRN: [id number]  Patient Class: IP- Inpatient   Admission Date: 7/11/2018  Length of Stay: 5 days  Attending Physician: Zayda England MD  Primary Care Provider: Tank Castro MD        Subjective:     Principal Problem:Acute encephalopathy    HPI:  Talia Mcfarland is a 66 y.o. female patient with PMHx of ESRD on HD, DM2, HTN, PVD, who presented to the ER from dialysis center with alert mental status . At dialysis center patient was receiving  First dose of vancomycin  For possible infection . She was confused so was transferred to ed . History obtained from electronic records no family bedside . 13 30 pm patient was seen by ed physician with confusion but awake . Later around 14 25 she was found not responsive bradycardiac and show breathing weak pulse was moved to ed room 4 from room 14 . Was intubated and after that was found to have no pulse(pea) cpr was started with two rounds , two epi,one atropine / calcium and bicarb  . After ROSc ekg done was with no acute changes .  . Not following commands but randomly moving extremities and opening eyes . Stat echo was done which showed right heart strain with possibility of PE . So cta was ordered . Also started on broad spectrum antibotic culture drawn     Hospital Course:  7/12  Patient seen and examined today .she is only fentanyl drip . no improvement in mental status after cardiac arrest . Gram negative bactermia . Continue to be ventilator   7/13  Patient has ACINETOBACTER SPECIES in blood and e coli in urine .infectious disease was informed recommended Unasyn and vancomycin as per pharmacy  . Neurology consulted for mental status after cardiac arrest   7/14  Patient was seen and examined today. Discuss plan of care with family . Minimal brain stem reflexes . No significant improvement after cardiac arrest -hypoxic brain injury . Started ciprofloxacin  For e  coli.   7/15  Waiting on family decision . Patient no improvement in mental status . She had seizure like received ativan as per e icu last night . Repetetive eyelid blinking can be related hypoxic brain injury after cardiac arrest   7/16  Patient was seen and examined today . No improvement in mental status .prognosis poor explained to the daughter bedside . Waiting on  to make final decision         Review of Systems   Unable to perform ROS: Intubated     Objective:     Vital Signs (Most Recent):  Temp: 99.6 °F (37.6 °C) (07/16/18 0715)  Pulse: 90 (07/16/18 1321)  Resp: 20 (07/16/18 1321)  BP: (!) 145/59 (07/16/18 1100)  SpO2: 100 % (07/16/18 1321) Vital Signs (24h Range):  Temp:  [98.8 °F (37.1 °C)-100.3 °F (37.9 °C)] 99.6 °F (37.6 °C)  Pulse:  [84-96] 90  Resp:  [10-30] 20  SpO2:  [97 %-100 %] 100 %  BP: (131-171)/(56-97) 145/59     Weight: 52 kg (114 lb 10.2 oz)  Body mass index is 23.15 kg/m².    Intake/Output Summary (Last 24 hours) at 07/16/18 1324  Last data filed at 07/16/18 1100   Gross per 24 hour   Intake             1270 ml   Output                0 ml   Net             1270 ml      Physical Exam   Constitutional: She appears well-developed.  Non-toxic appearance. She has a sickly appearance. She appears ill. No distress. She is intubated.   HENT:   Head: Normocephalic and atraumatic.   Mouth/Throat: Oropharynx is clear and moist and mucous membranes are normal.   Eyes: EOM and lids are normal.   Pupils sluggish   Neck: Trachea normal. Neck supple. Carotid bruit is not present.   Cardiovascular: Normal rate, regular rhythm and normal heart sounds.    Pulses:       Radial pulses are 2+ on the right side, and 2+ on the left side.        Dorsalis pedis pulses are 1+ on the right side, and 1+ on the left side.   Pulmonary/Chest: Effort normal. No accessory muscle usage. She is intubated. No respiratory distress. She has decreased breath sounds.   Abdominal: Soft. Normal appearance. She exhibits no  distension. Bowel sounds are decreased. There is no tenderness.   Musculoskeletal:        Right foot: There is no deformity.        Left foot: There is no deformity.   Mild edema   Feet:   Right Foot:   Skin Integrity: Positive for skin breakdown.   Left Foot:   Skin Integrity: Positive for skin breakdown.   Lymphadenopathy:     She has no cervical adenopathy.   Neurological: She is unresponsive.   Weak corneal reflex and pupils sluggish.  spont opens eyes to stimuli but inconsistent and weak withdrawal of LEs to pain but no withdrawal of UEs to pain. No spont or purposeful movements off any sedation.    Skin: Skin is warm and dry. Capillary refill takes less than 2 seconds. No rash noted. No cyanosis.        Nursing note and vitals reviewed.      Significant Labs: All pertinent labs within the past 24 hours have been reviewed.    Significant Imaging: I have reviewed all pertinent imaging results/findings within the past 24 hours.    Assessment/Plan:      * Acute encephalopathy    Hypoxic encephalopathy vs metabolic in setting sepsis  Continue antibotics   Neurology following           Septicemia due to Acinetobacter species    Continue with unsiyn         E. coli urinary tract infection    Continue with ciprofloxcin   Urine culture e coli           Deep tissue injury              Type 2 diabetes mellitus with hyperglycemia, without long-term current use of insulin    Continue with iss   And follow hba1c           Severe sepsis    - multilobar pneumonia on broad spectrum antibotics   - also possible oste in toe will order mri once patient stable  - continue with antibotics and follow up on cultures   7/12  Continue with zosyn and vancomycin  Blood culture gram negative rods         Acute respiratory failure with hypoxia    -acute hyper capneic and hypoxic respiratory failure   - in setting of multilobar pneumonia   - continue with broad spectrum antibotics and follow up on cultures   - pe rule out with  cta  7/15  Pneumonia continue with antibotics         HAP (hospital-acquired pneumonia)    -dialysis patient with multilobar pneumonia  - continue with broad spectrum antibotics   -follow up on cultures   - continue with ventilator support   7/12  Continue zosyn and vanco   Gram negative rods bactermia   7/13  Continue with unasyn   7/14  Continue with unasyn/ciprofloxcin   7/15   Acinetobacter in respiratory culture and blood culture acinetobacter continue with ampicillin-sulbactim           Cardiac arrest    -initial rhythm as per ed notes PEA  - two rounds of cpr with rosc   - echo with right heart strain and elevated PA and increase central vein pressure pe was rule out cta negative   - respiratory arrest in setting of pneumonia  7/12  No improvement in mental status   Will continue with vent support   7/13  No improvement in mental status neurology on board   7/15  Waiting on family recommendation         Thrombocytopenia              ESRD on hemodialysis    -nephrology on board   - case discussed with dr gabriel   7/12  Nephrology on board currently getting dialysis   7/14  On dialysis         Type II or unspecified type diabetes mellitus with renal manifestations, not stated as uncontrolled(250.40)                VTE Risk Mitigation         Ordered     heparin (porcine) injection 5,000 Units  Every 8 hours      07/11/18 1811          Critical care time spent on the evaluation and treatment of severe organ dysfunction, review of pertinent labs and imaging studies, discussions with consulting providers and discussions with patient/family:40 minutes.    Zayda England MD  Department of Hospital Medicine   [facility]

## 2018-07-17 PROBLEM — E87.5 HYPERKALEMIA: Status: ACTIVE | Noted: 2018-07-17

## 2018-07-17 LAB
ALBUMIN SERPL BCP-MCNC: 2.1 G/DL
ALLENS TEST: NORMAL
ALP SERPL-CCNC: 149 U/L
ALT SERPL W/O P-5'-P-CCNC: 12 U/L
ANION GAP SERPL CALC-SCNC: 20 MMOL/L
AST SERPL-CCNC: 23 U/L
BACTERIA SPEC AEROBE CULT: NORMAL
BASOPHILS # BLD AUTO: 0.06 K/UL
BASOPHILS NFR BLD: 0.4 %
BILIRUB SERPL-MCNC: 0.8 MG/DL
BUN SERPL-MCNC: 104 MG/DL
CALCIUM SERPL-MCNC: 9.8 MG/DL
CHLORIDE SERPL-SCNC: 101 MMOL/L
CO2 SERPL-SCNC: 19 MMOL/L
CREAT SERPL-MCNC: 5.7 MG/DL
DELSYS: NORMAL
DIFFERENTIAL METHOD: ABNORMAL
EOSINOPHIL # BLD AUTO: 0.3 K/UL
EOSINOPHIL NFR BLD: 2.3 %
ERYTHROCYTE [DISTWIDTH] IN BLOOD BY AUTOMATED COUNT: 19.7 %
ERYTHROCYTE [SEDIMENTATION RATE] IN BLOOD BY WESTERGREN METHOD: 16 MM/H
EST. GFR  (AFRICAN AMERICAN): 8 ML/MIN/1.73 M^2
EST. GFR  (NON AFRICAN AMERICAN): 7 ML/MIN/1.73 M^2
FIO2: 35
GLUCOSE SERPL-MCNC: 67 MG/DL
GRAM STN SPEC: NORMAL
HCO3 UR-SCNC: 27.6 MMOL/L (ref 24–28)
HCT VFR BLD AUTO: 33 %
HGB BLD-MCNC: 10 G/DL
LYMPHOCYTES # BLD AUTO: 2.5 K/UL
LYMPHOCYTES NFR BLD: 18.2 %
MAGNESIUM SERPL-MCNC: 2.8 MG/DL
MCH RBC QN AUTO: 23.8 PG
MCHC RBC AUTO-ENTMCNC: 30.3 G/DL
MCV RBC AUTO: 78 FL
MODE: NORMAL
MONOCYTES # BLD AUTO: 2 K/UL
MONOCYTES NFR BLD: 14.4 %
NEUTROPHILS # BLD AUTO: 8.8 K/UL
NEUTROPHILS NFR BLD: 68.1 %
PCO2 BLDA: 41.2 MMHG (ref 35–45)
PEEP: 8
PH SMN: 7.43 [PH] (ref 7.35–7.45)
PHOSPHATE SERPL-MCNC: 4.7 MG/DL
PLATELET # BLD AUTO: 94 K/UL
PMV BLD AUTO: ABNORMAL FL
PO2 BLDA: 94 MMHG (ref 80–100)
POC BE: 3 MMOL/L
POC SATURATED O2: 98 % (ref 95–100)
POCT GLUCOSE: 182 MG/DL (ref 70–110)
POCT GLUCOSE: 48 MG/DL (ref 70–110)
POCT GLUCOSE: 68 MG/DL (ref 70–110)
POCT GLUCOSE: 94 MG/DL (ref 70–110)
POCT GLUCOSE: 96 MG/DL (ref 70–110)
POTASSIUM SERPL-SCNC: 5.3 MMOL/L
PROT SERPL-MCNC: 6.8 G/DL
RBC # BLD AUTO: 4.21 M/UL
SAMPLE: NORMAL
SITE: NORMAL
SODIUM SERPL-SCNC: 140 MMOL/L
VT: 350
WBC # BLD AUTO: 13.65 K/UL

## 2018-07-17 PROCEDURE — 99291 CRITICAL CARE FIRST HOUR: CPT | Mod: ,,, | Performed by: NURSE PRACTITIONER

## 2018-07-17 PROCEDURE — 63600175 PHARM REV CODE 636 W HCPCS: Performed by: INTERNAL MEDICINE

## 2018-07-17 PROCEDURE — 94668 MNPJ CHEST WALL SBSQ: CPT

## 2018-07-17 PROCEDURE — 82803 BLOOD GASES ANY COMBINATION: CPT

## 2018-07-17 PROCEDURE — 25000242 PHARM REV CODE 250 ALT 637 W/ HCPCS: Performed by: NURSE PRACTITIONER

## 2018-07-17 PROCEDURE — 36600 WITHDRAWAL OF ARTERIAL BLOOD: CPT

## 2018-07-17 PROCEDURE — 25000003 PHARM REV CODE 250: Performed by: NURSE PRACTITIONER

## 2018-07-17 PROCEDURE — 25000003 PHARM REV CODE 250: Performed by: INTERNAL MEDICINE

## 2018-07-17 PROCEDURE — 63600175 PHARM REV CODE 636 W HCPCS: Performed by: PSYCHIATRY & NEUROLOGY

## 2018-07-17 PROCEDURE — 20000000 HC ICU ROOM

## 2018-07-17 PROCEDURE — 99291 CRITICAL CARE FIRST HOUR: CPT | Mod: ,,, | Performed by: INTERNAL MEDICINE

## 2018-07-17 PROCEDURE — S0028 INJECTION, FAMOTIDINE, 20 MG: HCPCS | Performed by: INTERNAL MEDICINE

## 2018-07-17 PROCEDURE — 83735 ASSAY OF MAGNESIUM: CPT

## 2018-07-17 PROCEDURE — 80053 COMPREHEN METABOLIC PANEL: CPT

## 2018-07-17 PROCEDURE — 84100 ASSAY OF PHOSPHORUS: CPT

## 2018-07-17 PROCEDURE — 94640 AIRWAY INHALATION TREATMENT: CPT

## 2018-07-17 PROCEDURE — 94003 VENT MGMT INPAT SUBQ DAY: CPT

## 2018-07-17 PROCEDURE — 36415 COLL VENOUS BLD VENIPUNCTURE: CPT

## 2018-07-17 PROCEDURE — 80100016 HC MAINTENANCE HEMODIALYSIS

## 2018-07-17 PROCEDURE — 85025 COMPLETE CBC W/AUTO DIFF WBC: CPT

## 2018-07-17 PROCEDURE — 99900026 HC AIRWAY MAINTENANCE (STAT)

## 2018-07-17 PROCEDURE — 99900035 HC TECH TIME PER 15 MIN (STAT)

## 2018-07-17 RX ORDER — SODIUM CHLORIDE 9 MG/ML
INJECTION, SOLUTION INTRAVENOUS
Status: DISCONTINUED | OUTPATIENT
Start: 2018-07-17 | End: 2018-07-18

## 2018-07-17 RX ORDER — SODIUM CHLORIDE 9 MG/ML
INJECTION, SOLUTION INTRAVENOUS ONCE
Status: DISCONTINUED | OUTPATIENT
Start: 2018-07-17 | End: 2018-07-18

## 2018-07-17 RX ORDER — METOPROLOL TARTRATE 50 MG/1
50 TABLET ORAL 2 TIMES DAILY
Status: DISCONTINUED | OUTPATIENT
Start: 2018-07-17 | End: 2018-07-18

## 2018-07-17 RX ADMIN — IPRATROPIUM BROMIDE AND ALBUTEROL SULFATE 3 ML: .5; 3 SOLUTION RESPIRATORY (INHALATION) at 12:07

## 2018-07-17 RX ADMIN — SODIUM CHLORIDE 3 G: 9 INJECTION, SOLUTION INTRAVENOUS at 06:07

## 2018-07-17 RX ADMIN — FAMOTIDINE 20 MG: 10 INJECTION INTRAVENOUS at 09:07

## 2018-07-17 RX ADMIN — ACETYLCYSTEINE 4 ML: 100 SOLUTION ORAL; RESPIRATORY (INHALATION) at 08:07

## 2018-07-17 RX ADMIN — IPRATROPIUM BROMIDE AND ALBUTEROL SULFATE 3 ML: .5; 3 SOLUTION RESPIRATORY (INHALATION) at 01:07

## 2018-07-17 RX ADMIN — HEPARIN SODIUM 5000 UNITS: 5000 INJECTION, SOLUTION INTRAVENOUS; SUBCUTANEOUS at 02:07

## 2018-07-17 RX ADMIN — CHLORHEXIDINE GLUCONATE 15 ML: 1.2 RINSE ORAL at 09:07

## 2018-07-17 RX ADMIN — CHLORHEXIDINE GLUCONATE 15 ML: 1.2 RINSE ORAL at 08:07

## 2018-07-17 RX ADMIN — METOPROLOL TARTRATE 50 MG: 50 TABLET, FILM COATED ORAL at 09:07

## 2018-07-17 RX ADMIN — ACETYLCYSTEINE 4 ML: 100 SOLUTION ORAL; RESPIRATORY (INHALATION) at 07:07

## 2018-07-17 RX ADMIN — HEPARIN SODIUM 5000 UNITS: 5000 INJECTION, SOLUTION INTRAVENOUS; SUBCUTANEOUS at 11:07

## 2018-07-17 RX ADMIN — IPRATROPIUM BROMIDE AND ALBUTEROL SULFATE 3 ML: .5; 3 SOLUTION RESPIRATORY (INHALATION) at 07:07

## 2018-07-17 RX ADMIN — METOPROLOL TARTRATE 50 MG: 50 TABLET, FILM COATED ORAL at 08:07

## 2018-07-17 RX ADMIN — LEVETIRACETAM 500 MG: 5 INJECTION INTRAVENOUS at 09:07

## 2018-07-17 RX ADMIN — IPRATROPIUM BROMIDE AND ALBUTEROL SULFATE 3 ML: .5; 3 SOLUTION RESPIRATORY (INHALATION) at 06:07

## 2018-07-17 RX ADMIN — HEPARIN SODIUM 5000 UNITS: 5000 INJECTION, SOLUTION INTRAVENOUS; SUBCUTANEOUS at 06:07

## 2018-07-17 RX ADMIN — LEVETIRACETAM 500 MG: 5 INJECTION INTRAVENOUS at 08:07

## 2018-07-17 RX ADMIN — DEXTROSE MONOHYDRATE 12.5 G: 500 INJECTION PARENTERAL at 06:07

## 2018-07-17 RX ADMIN — DEXTROSE MONOHYDRATE 12.5 G: 500 INJECTION PARENTERAL at 12:07

## 2018-07-17 RX ADMIN — CIPROFLOXACIN 400 MG: 2 INJECTION, SOLUTION INTRAVENOUS at 06:07

## 2018-07-17 NOTE — SUBJECTIVE & OBJECTIVE
Review of Systems   Unable to perform ROS: Intubated     Objective:     Vital Signs (Most Recent):  Temp: 98.1 °F (36.7 °C) (07/17/18 0700)  Pulse: 84 (07/17/18 1122)  Resp: 17 (07/17/18 1122)  BP: (!) 169/72 (07/17/18 0904)  SpO2: 100 % (07/17/18 1122) Vital Signs (24h Range):  Temp:  [98.1 °F (36.7 °C)-100 °F (37.8 °C)] 98.1 °F (36.7 °C)  Pulse:  [71-91] 84  Resp:  [4-28] 17  SpO2:  [99 %-100 %] 100 %  BP: (132-174)/(58-86) 169/72     Weight: 52 kg (114 lb 10.2 oz)  Body mass index is 23.15 kg/m².    Intake/Output Summary (Last 24 hours) at 07/17/18 1143  Last data filed at 07/17/18 0705   Gross per 24 hour   Intake              675 ml   Output                0 ml   Net              675 ml      Physical Exam   Constitutional: She appears well-developed. She is intubated.   HENT:   Head: Normocephalic.   ET and OG tubes in place.   Neck: No thyromegaly present.   Cardiovascular: Normal rate and regular rhythm.  Exam reveals no friction rub.    Pulmonary/Chest: Breath sounds normal. She is intubated. She has no wheezes. She exhibits no tenderness.   Abdominal: Soft. Bowel sounds are normal. She exhibits no distension. There is no tenderness.   Musculoskeletal: She exhibits no edema.   Lymphadenopathy:     She has no cervical adenopathy.   Neurological: She is unresponsive.   Skin: Skin is warm and dry. No rash noted.       Significant Labs: All pertinent labs within the past 24 hours have been reviewed.    Significant Imaging: I have reviewed all pertinent imaging results/findings within the past 24 hours.

## 2018-07-17 NOTE — PROGRESS NOTES
[facility] -   Nephrology  Progress Note    Patient Name: Talia Mcfarland  MRN: [id number]  Admission Date: 7/11/2018  Hospital Length of Stay: 6 days  Attending Provider: Zayda England MD   Primary Care Physician: Tank Castro MD  Principal Problem:Acute encephalopathy    Subjective:     HPI: Talia Mcfarland is a 78 y old AA woman with h/o HTN, PAD , ESRD on HD ( MWF , [facility] ) , DM-2 , highly noncompliant with outpatient dialysis treatments, patient misses at least 1 treatment every week for outpatient dialysis team, patient went to hemodialysis treatment today, she had some fever and nausea at the dialysis unit, and a set of blood cultures were drawn and patient received a g of vancomycin and was sent to the emergency room, while she was in the ER here patient developed cardiac arrest requiring CPR and was intubated.  Patient started on broad-spectrum antibiotics.  She also has a wound on her left ft, significant peripheral edema noted likely due to nonadherence with dialysis.  CT chest done on admission showed significant left-sided pneumonia.    Interval History:    7/17/18: Patient remains intubated and unresponsive. Patient is DNR.         Review of patient's allergies indicates:   Allergen Reactions    Tylenol [acetaminophen] Other (See Comments)     Headache     Current Facility-Administered Medications   Medication Frequency    acetylcysteine 100 mg/ml (10%) solution 4 mL BID    albumin human 25% bottle 12.5 g PRN    albuterol-ipratropium 2.5 mg-0.5 mg/3 mL nebulizer solution 3 mL Q6H    ampicillin-sulbactam 3 g in sodium chloride 0.9 % 100 mL IVPB (ready to mix system) Q24H    bisacodyl suppository 10 mg Daily PRN    chlorhexidine 0.12 % solution 15 mL BID    ciprofloxacin (CIPRO)400mg/200ml D5W IVPB 400 mg Q24H    dextrose 50% injection 12.5 g PRN    famotidine (PF) injection 20 mg Daily    glucagon (human recombinant) injection 1 mg PRN    heparin (porcine)  injection 5,000 Units Q8H    insulin aspart U-100 pen 0-5 Units Q6H PRN    levETIRAcetam in NaCl (iso-os) IVPB 500 mg Q12H    levETIRAcetam in NaCl (iso-os) IVPB 500 mg Every Mon, Wed, Fri    metoprolol tartrate (LOPRESSOR) tablet 50 mg BID    polyethylene glycol packet 17 g Daily       Objective:     Vital Signs (Most Recent):  Temp: 98.4 °F (36.9 °C) (07/17/18 0305)  Pulse: 87 (07/17/18 0834)  Resp: (!) 9 (07/17/18 0834)  BP: (!) 173/73 (07/17/18 0600)  SpO2: 100 % (07/17/18 0834)  O2 Device (Oxygen Therapy): ventilator (07/17/18 0834) Vital Signs (24h Range):  Temp:  [98.4 °F (36.9 °C)-100 °F (37.8 °C)] 98.4 °F (36.9 °C)  Pulse:  [71-93] 87  Resp:  [4-28] 9  SpO2:  [99 %-100 %] 100 %  BP: (132-174)/(58-83) 173/73     Weight: 52 kg (114 lb 10.2 oz) (07/16/18 0500)  Body mass index is 23.15 kg/m².  Body surface area is 1.47 meters squared.    I/O last 3 completed shifts:  In: 1470 [NG/GT:570; IV Piggyback:900]  Out: -     Physical Exam   Constitutional: She appears well-developed. She is intubated.   HENT:   Head: Normocephalic.   ET and OG tubes in place.   Neck: No thyromegaly present.   Cardiovascular: Normal rate and regular rhythm.  Exam reveals no friction rub.    Pulmonary/Chest: Breath sounds normal. She is intubated. She has no wheezes. She exhibits no tenderness.   Abdominal: Soft. Bowel sounds are normal. She exhibits no distension. There is no tenderness.   Musculoskeletal: She exhibits no edema.   Lymphadenopathy:     She has no cervical adenopathy.   Neurological: She is unresponsive.   Skin: Skin is warm and dry. No rash noted.       Significant Labs:  Lab Results   Component Value Date    CREATININE 5.7 (H) 07/17/2018     (H) 07/17/2018     07/17/2018    K 5.3 (H) 07/17/2018     07/17/2018    CO2 19 (L) 07/17/2018     Lab Results   Component Value Date    CALCIUM 9.8 07/17/2018    PHOS 4.7 (H) 07/17/2018     Lab Results   Component Value Date    ALBUMIN 2.1 (L) 07/17/2018      Lab Results   Component Value Date    WBC 13.65 (H) 07/17/2018    HGB 10.0 (L) 07/17/2018    HCT 33.0 (L) 07/17/2018    MCV 78 (L) 07/17/2018    PLT 94 (L) 07/17/2018       Recent Labs  Lab 07/17/18  0333   MG 2.8*         Significant Imaging:  Imaging Results          CTA Chest Non Coronary (Final result)  Result time 07/11/18 17:46:11    Final result by Adolfo Douglas Jr., MD (07/11/18 17:46:11)                 Impression:      No pulmonary embolus.    Multilobar pneumonia.    Enlargement of the pulmonary arteries consistent with pulmonary artery hypertension.    Marked cardiomegaly.    All CT scan at this facility use dose modulation, iterative reconstruction, and/or weight base dosing when appropriate to reduce radiation dose to as low as reasonably achievable.      Electronically signed by: Adolfo Douglas Jr., MD  Date:    07/11/2018  Time:    17:46             Narrative:    EXAMINATION:  CTA CHEST NON CORONARY    CLINICAL HISTORY:  Chest pain, acute, PE suspected, high pretest prob;    TECHNIQUE:  Axial CTA images performed through the chest after the administration of 100 cc intravenous contrast. Maximum intensity projections were performed and interpreted.    FINDINGS:  There is no evidence of pulmonary embolus. Pulmonary arteries are enlarged with the main pulmonary artery measuring 4.5 cm.  The heart is markedly enlarged with dense atherosclerotic calcifications of the coronary arteries.  The great vessels are patent.  Endotracheal tube in satisfactory position.  No mediastinal adenopathy.  Probable hemodialysis graft partially visualized the left upper extremity.    Extensive areas of consolidation involving the left upper and lower lobes.  There is also consolidation in the medial right lower lobe and patchy areas of ground-glass opacification within the right middle lobe.  Visualized abdominal organs are unremarkable.  Osseous structures are intact.  No effusion or pneumothorax.                                CT Head Without Contrast (Final result)  Result time 07/11/18 17:19:21    Final result by Adolfo Douglas Jr., MD (07/11/18 17:19:21)                 Impression:      1. No acute intracranial findings.  Stable exam.  All CT scans at this facility are performed  using dose modulation techniques as appropriate to performed exam including the following:  automated exposure control; adjustment of mA and/or kV according to the patients size (this includes techniques or standardized protocols for targeted exams where dose is matched to indication/reason for exam: i.e. extremities or head);  iterative reconstruction technique.      Electronically signed by: Adolfo Douglas Jr., MD  Date:    07/11/2018  Time:    17:19             Narrative:    EXAMINATION:  CT HEAD WITHOUT CONTRAST    CLINICAL HISTORY:  Decreased alertness;    TECHNIQUE:  CT scan was obtained of the head without administration of contrast.    COMPARISON:  07/11/2018.    FINDINGS:  Stable atrophy with periventricular white matter changes.  No extra-axial acute fluid collection.Ventricles and basal cisterns are normal.  No hemorrhage, mass effect or midline shift.  No other cerebral or cerebellar parenchymal abnormality.  Paranasal sinuses are clear.  Mastoid air cells are clear.  Calvarium is intact.                               X-Ray Chest AP Portable (Final result)  Result time 07/11/18 15:17:22    Final result by Vamshi Lynne MD (07/11/18 15:17:22)                 Impression:      As above.      Electronically signed by: Vamshi Lynne MD  Date:    07/11/2018  Time:    15:17             Narrative:    EXAMINATION:  XR CHEST AP PORTABLE    CLINICAL HISTORY:  post intubation; respiratory distress    TECHNIQUE:  AP view of the chest was performed.    COMPARISON:  Earlier today.    FINDINGS:  Endotracheal tube in satisfactory position.  Worsening left upper lobe pneumonia.                               X-Ray Foot Complete Left (Final result)  Result  time 07/11/18 13:18:19    Final result by Vamshi Lynne MD (07/11/18 13:18:19)                 Impression:      Charcot arthropathy.  Superimposed osteomyelitis is not excluded.  Consider MRI correlation.      Electronically signed by: Vamshi Lynne MD  Date:    07/11/2018  Time:    13:18             Narrative:    EXAMINATION:  XR FOOT COMPLETE 3 VIEW LEFT    CLINICAL HISTORY:  Osteomyelitis, unspecified.   Pain.    TECHNIQUE:  3 views.    COMPARISON:  None    FINDINGS:  Severe osteopenia.  Extensive vascular calcifications.  Plantar subluxation of the midfoot.  Sclerosis and partial collapse of the middle cuneiform.  Extensive erosive changes throughout the midfoot.  Diffuse soft tissue swelling.  Prominent plantar spur.                               X-Ray Chest AP Portable (Final result)  Result time 07/11/18 13:15:44    Final result by Vamshi Lynne MD (07/11/18 13:15:44)                 Impression:      Left upper lobe pneumonia.  Recommend close follow-up after treatment.      Electronically signed by: Vamshi Lynne MD  Date:    07/11/2018  Time:    13:15             Narrative:    EXAMINATION:  XR CHEST AP PORTABLE    CLINICAL HISTORY:  cough;    TECHNIQUE:  AP view of the chest was performed.    COMPARISON:  October 2, 2017    FINDINGS:  Left upper lobe consolidation.    Stable cardiomegaly.  Aortic atherosclerosis.  No acute osseous findings demonstrated.                               US Lower Extremity Veins Bilateral (Final result)  Result time 07/11/18 13:15:04    Final result by Vamshi Lynne MD (07/11/18 13:15:04)                 Impression:      Negative for bilateral lower extremity DVT.  Limited visualization of the left calf veins.      Electronically signed by: Vamshi Lynne MD  Date:    07/11/2018  Time:    13:15             Narrative:    EXAMINATION:  US LOWER EXTREMITY VEINS BILATERAL    CLINICAL HISTORY:  Edema, unspecified    FINDINGS:  Grayscale and color Doppler sonography was formed through the  bilateral lower extremities.    The visualized bilateral lower extremity veins are widely patent with normal augmentation, and compressibility and respiratory variability.  The left leg and calf veins were not visualized secondary to use marked soft tissue edema and skin thickening.  No evidence of fluid collection.                               CT Head Without Contrast (Final result)  Result time 07/11/18 12:18:25    Final result by Benedict Henderson MD (07/11/18 12:18:25)                 Impression:      Chronic microvascular ischemic changes.      Electronically signed by: Benedict Henderson MD  Date:    07/11/2018  Time:    12:18             Narrative:    EXAMINATION:  CT HEAD WITHOUT CONTRAST    CLINICAL HISTORY:  ams;    TECHNIQUE:  Low dose axial CT images obtained throughout the head without intravenous contrast. Sagittal and coronal reconstructions were performed.  All CT scans at this facility use dose modulation, iterative reconstruction and/or weight based dosing when appropriate to reduce radiation dose to as low as reasonably achievable.    COMPARISON:  03/14/2016    FINDINGS:  Intracranial compartment:    The brain parenchyma demonstrates areas of decreased attenuation with mild to moderate periventricular white matter consistent with chronic microvascular ischemic changes..  No parenchymal mass, hemorrhage, edema or major vascular distribution infarct.  Vascular calcifications are noted.    Mild prominence of the sulci and ventricles are consistent with age-related involutional changes.    No extra-axial blood or fluid collections.    Skull/extracranial contents (limited evaluation): No fracture. Mastoid air cells and paranasal sinuses are essentially clear.                                  Assessment/Plan:     Hyperkalemia    Mild, monitor for now.         Septicemia due to Acinetobacter species    As per ID. Continue antibiotics.         HAP (hospital-acquired pneumonia)    Continue antibiotics.          Cardiac arrest    As per ICU/Cardiology.        Thrombocytopenia    Monitor for now.         ESRD on hemodialysis    Patient on MWF schedule as outpatient. Very non-compliant with outpatient HD treatment. Will discuss with ICU team and family whether HD is still indicated/wanted.     Access: Left arm AVF.               Total ICU time spent: 35 minutes. More than 50 % of time was spent on chart review and discussing patient with ICU team.       Thank you for your consult. I will follow-up with patient. Please contact us if you have any additional questions.    Braden Fuentes MD  Nephrology  [facility]

## 2018-07-17 NOTE — PLAN OF CARE
Problem: Patient Care Overview  Goal: Plan of Care Review  Outcome: Ongoing (interventions implemented as appropriate)  Patient remains on vent and specialty turn bed this a.m.  Patients blood sugar at midnight was 48 and D50 was given.  Recheck was 98.  Due to check again at 0600.  See MAR for medication doses and times of administration.

## 2018-07-17 NOTE — PROGRESS NOTES
Pt completed 2.5 hours of HD tx with 2 L net UF.  Pt tolerated tx well. No access issues. Dr. Fuentes rounded on pt at bedside.  Post tx, blood rinsed back, needles removed, and hemostasis achieved. Report to nurse attending.

## 2018-07-17 NOTE — PROGRESS NOTES
[facility]  Critical Care Medicine  Progress Note    Patient Name: Talia Mcfarland  MRN: [id number]  Admission Date: 7/11/2018  Hospital Length of Stay: 6 days  Code Status: DNR  Attending Provider: Zayda England MD  Primary Care Provider: Tank Castro MD   Principal Problem: Acute encephalopathy    Subjective:     HPI:  68-year-old female patient brought by EMS to the emergency department from dialysis after she complained of malaise and had altered mental status after dialysis today.    Hospital/ICU Course:  While in the ER the patient had pulseless electrical activity and she had CPR performed for about 6 min.  Patient was seen and examined in the emergency room.  She was intubated.  Chest x-ray post intubation showed low ET tube ET tube was pulled out.  Repeat chest x-ray showed good position of ET tube.  7/12 Seen and examined. Intubated, no sedation, no pressors. Tmax 100.8. Bld cx GNR. CXR and ABG reviewed.   7/13 patient seen and examined, intubated, not sedated no pressors.  Chest x-ray and ABG reviewed no changes in mental status.  Breathing over the vent.  Not moving extremities unresponsive.  7/14 seen and examined, intubated, no sedation.  Chest x-ray and ABG reviewed.  No overall change in patient's status.  Had dialysis today.  7/15 - No neuro improvement still resting on full vent support with TF.  Family at bedside.   7/16 - Still no neuro change.  Resting on vent and tolerating TF.   7/17 - No neuro change.  Resting on vent and tolerating TF.  Discussed care with spouse at bedside yesterday and he states patient would not want Trach/PEG and long term support.  He plans to make final decision regarding Trach/PEG vs comfort care tomorrow    Review of Systems   Unable to perform ROS: Intubated       Objective:     Vital Signs (Most Recent):  Temp: 98.4 °F (36.9 °C) (07/17/18 0305)  Pulse: 84 (07/17/18 0712)  Resp: 15 (07/17/18 0712)  BP: (!) 173/73 (07/17/18 0600)  SpO2: 100 %  (07/17/18 0712) Vital Signs (24h Range):  Temp:  [98.4 °F (36.9 °C)-100 °F (37.8 °C)] 98.4 °F (36.9 °C)  Pulse:  [71-93] 84  Resp:  [4-28] 15  SpO2:  [99 %-100 %] 100 %  BP: (132-174)/(58-83) 173/73     Weight: 52 kg (114 lb 10.2 oz)  Body mass index is 23.15 kg/m².      Intake/Output Summary (Last 24 hours) at 07/17/18 0736  Last data filed at 07/16/18 2127   Gross per 24 hour   Intake              850 ml   Output                0 ml   Net              850 ml       Physical Exam   Constitutional: She appears well-developed. She appears cachectic.  Non-toxic appearance. She has a sickly appearance. She appears ill. No distress. She is intubated.   HENT:   Head: Normocephalic and atraumatic.   Mouth/Throat: Oropharynx is clear and moist and mucous membranes are normal.   Eyes: EOM and lids are normal.   Pupils sluggish   Neck: Trachea normal. Neck supple. Carotid bruit is not present.   Cardiovascular: Normal rate, regular rhythm and normal heart sounds.    Pulses:       Radial pulses are 2+ on the right side, and 2+ on the left side.        Dorsalis pedis pulses are 1+ on the right side, and 1+ on the left side.   Pulmonary/Chest: Effort normal. No accessory muscle usage. She is intubated. No respiratory distress. She has decreased breath sounds. She has rhonchi (improved post suction of OET of thick yellow secretions).   Abdominal: Soft. Normal appearance. She exhibits no distension. Bowel sounds are decreased. There is no tenderness.   Musculoskeletal:        Right foot: There is no deformity.        Left foot: There is no deformity.   Mild edema   Feet:   Right Foot:   Skin Integrity: Positive for skin breakdown.   Left Foot:   Skin Integrity: Positive for skin breakdown.   Lymphadenopathy:     She has no cervical adenopathy.   Neurological: She is unresponsive.   Weak corneal reflex and pupils sluggish.  Spont opens eyes to stimuli but inconsistent and weak withdrawal of LEs to pain but no withdrawal of UEs to  pain. No spont or purposeful movements off any sedation.    Skin: Skin is warm and dry. Capillary refill takes less than 2 seconds. No rash noted. No cyanosis.            Vents:  Vent Mode: A/C (07/17/18 0712)  Ventilator Initiated: Yes (07/11/18 1454)  Set Rate: 16 bmp (07/17/18 0712)  Vt Set: 350 mL (07/17/18 0712)  Pressure Support: 0 cmH20 (07/17/18 0712)  PEEP/CPAP: 8 cmH20 (07/17/18 0712)  Oxygen Concentration (%): 35 (07/17/18 0712)  Peak Airway Pressure: 32 cmH2O (07/17/18 0712)  Plateau Pressure: 0 cmH20 (07/17/18 0712)  Total Ve: 15.6 mL (07/17/18 0712)  F/VT Ratio<105 (RSBI): (!) 15.4 (07/17/18 0712)    Lines/Drains/Airways     Drain                 Hemodialysis AV Fistula Left upper arm -- days         NG/OG Tube 07/11/18 1811 orogastric 18 Fr. Left mouth 5 days          Airway                 Airway - Non-Surgical 07/11/18 1426 Endotracheal Tube 5 days          Pressure Ulcer                 Pressure Injury 07/12/18 Left Heel Deep tissue injury 5 days         Pressure Injury 07/12/18 Right Heel Deep tissue injury 5 days          Peripheral Intravenous Line                 Peripheral IV - Single Lumen 07/11/18 1240 Right Forearm 5 days         Peripheral IV - Single Lumen 07/11/18 1438 Right  5 days                Significant Labs:    CBC/Anemia Profile:    Recent Labs  Lab 07/16/18  0405 07/17/18  0333   WBC 10.74 13.65*   HGB 9.8* 10.0*   HCT 33.0* 33.0*   PLT 93* 94*   MCV 80* 78*   RDW 19.7* 19.7*        Chemistries:    Recent Labs  Lab 07/16/18  0405 07/17/18  0333    140   K 4.2 5.3*   CL 99 101   CO2 23 19*   BUN 85* 104*   CREATININE 5.3* 5.7*   CALCIUM 9.8 9.8   ALBUMIN 2.3* 2.1*   PROT 6.6 6.8   BILITOT 0.8 0.8   ALKPHOS 180* 149*   ALT 13 12   AST 20 23   MG 2.5 2.8*   PHOS 3.6 4.7*       ABGs:   Recent Labs  Lab 07/17/18  0448   PH 7.433   PCO2 41.2   HCO3 27.6   POCSATURATED 98   BE 3     All pertinent labs within the past 24 hours have been reviewed.    Significant Imaging:  CXR: I  have reviewed all pertinent results/findings within the past 24 hours and my personal findings are:  no change.  Persistent ROSLYN and LLL infiltrates      Assessment/Plan:     Neuro   * Acute encephalopathy    Hypoxic vs Toxic vs Metabolic or likely multifactorial  Neuro following  No neuro improvement and has not had any sedation  MRI head pending family decision of aggressive vs comfort care   Keppra per Neuro         Psychiatric   Hx of noncompliance with medical treatment, presenting hazards to health    Hx of missing RRT weekly and often stopping RRT short per report        Pulmonary   HAP (hospital-acquired pneumonia)    Sputum + Acinetobacter  Cont Unasyn, Duoneb, Mucoyst, CPT and OET suctioning  CXR daily  VAP prophylaxis          Acute respiratory failure with hypoxia    Vent settings reviewed   Cont full vent support  SBT daily  Plan Trach/PEG this week if family desires to continue aggressive Rx, decision per spouse tomorrow        Cardiac/Vascular   Cardiac arrest    Cont ICU cardiac monitoring        Hypertension    Increase Lopressor        Renal/   E. coli urinary tract infection    Cont Cipro        ESRD on hemodialysis    Renal following  RRT likely today given rising K+ and acidosis        ID   Severe sepsis    Broad-spectrum antibiotic with IV Zosyn and vancomycin    7/12 GNR septicemia. IV Zosyn.  7/14 bacteremia Acinetobacter pansensitive , E coli in the urine awaiting sensitivity        Septicemia due to Acinetobacter species    Cont Unasyn  Repeat blood cultures X 2 7/16 NGTD        Hematology   Thrombocytopenia    No active bleeding  CBC daily  Stable today        Endocrine   Type 2 diabetes mellitus with hyperglycemia, without long-term current use of insulin    SSI and stop Detemir due to hypoglycemia this AM        Orthopedic   Deep tissue injury    Turn Q 2 hours and continue wound care and low flow air mattress              Preventive Measures and Monitoring:   Stress Ulcer:  Pepcid  Nutrition: TF  Glucose control: SSI   Bowel prophylaxis: Miralax  DVT prophylaxis: SQ Hep  Hx CAD on B-Blocker: Lopressor  Head of Bed/Reposition: Elevate HOB and turn Q1-2 hours   Early Mobility: bed rest  SBT: daily  RASS goal: no sedation  Vent Day: #7  OG Day: #7  IVAB Day: #5  Code Status: DNR  Pneumonia Vaccine: UTD     Counseling/Consultation:I have discussed the care of this patient in detail with the bedside nursing staff and Dr. Sky and Dr. England      Critical Care Time: 48 minutes  Critical secondary to Patient has a condition that poses threat to life and bodily function: intubated on mechanical ventilation  Patient has an abrupt change in neurologic status: Encephalopathy      Critical care was time spent personally by me on the following activities: development of treatment plan with patient or surrogate and bedside caregivers, discussions with consultants, evaluation of patient's response to treatment, examination of patient, ordering and performing treatments and interventions, ordering and review of laboratory studies, ordering and review of radiographic studies, pulse oximetry, re-evaluation of patient's condition. This critical care time did not overlap with that of any other provider or involve time for any procedures.     Adolfo Dow NP  Critical Care Medicine  [facility]

## 2018-07-17 NOTE — ASSESSMENT & PLAN NOTE
-dialysis patient with multilobar pneumonia  - continue with broad spectrum antibotics   -follow up on cultures   - continue with ventilator support   7/12  Continue zosyn and vanco   Gram negative rods bactermia   7/13  Continue with unasyn   7/14  Continue with unasyn/ciprofloxcin   7/15   Acinetobacter in respiratory culture and blood culture acinetobacter continue with ampicillin-sulbactim and ciprofloxcin

## 2018-07-17 NOTE — PROGRESS NOTES
[facility] Hospital Medicine  Progress Note    Patient Name: Talia Mcfarland  MRN: [id number]  Patient Class: IP- Inpatient   Admission Date: 7/11/2018  Length of Stay: 6 days  Attending Physician: Zayda England MD  Primary Care Provider: Tank Castro MD        Subjective:     Principal Problem:Acute encephalopathy    HPI:  Talia Mcfarland is a 66 y.o. female patient with PMHx of ESRD on HD, DM2, HTN, PVD, who presented to the ER from dialysis center with alert mental status . At dialysis center patient was receiving  First dose of vancomycin  For possible infection . She was confused so was transferred to ed . History obtained from electronic records no family bedside . 13 30 pm patient was seen by ed physician with confusion but awake . Later around 14 25 she was found not responsive bradycardiac and show breathing weak pulse was moved to ed room 4 from room 14 . Was intubated and after that was found to have no pulse(pea) cpr was started with two rounds , two epi,one atropine / calcium and bicarb  . After ROSc ekg done was with no acute changes .  . Not following commands but randomly moving extremities and opening eyes . Stat echo was done which showed right heart strain with possibility of PE . So cta was ordered . Also started on broad spectrum antibotic culture drawn     Hospital Course:  7/12  Patient seen and examined today .she is only fentanyl drip . no improvement in mental status after cardiac arrest . Gram negative bactermia . Continue to be ventilator   7/13  Patient has ACINETOBACTER SPECIES in blood and e coli in urine .infectious disease was informed recommended Unasyn and vancomycin as per pharmacy  . Neurology consulted for mental status after cardiac arrest   7/14  Patient was seen and examined today. Discuss plan of care with family . Minimal brain stem reflexes . No significant improvement after cardiac arrest -hypoxic brain injury . Started ciprofloxacin  For e  coli.   7/15  Waiting on family decision . Patient no improvement in mental status . She had seizure like received ativan as per e icu last night . Repetetive eyelid blinking can be related hypoxic brain injury after cardiac arrest   7/16  Patient was seen and examined today . No improvement in mental status .prognosis poor explained to the daughter bedside . Waiting on  to make final decision   7/17  Waiting on  decision he mention he will decided tomorrow. More inclined towards hospice . Patient expressed her wishes in the past didn't want long term vent and life support . Continue treatment for acinetobacter pneumonia and e coli uti . Hyperkalemia today will get dialysis today         Review of Systems   Unable to perform ROS: Intubated     Objective:     Vital Signs (Most Recent):  Temp: 98.1 °F (36.7 °C) (07/17/18 0700)  Pulse: 84 (07/17/18 1122)  Resp: 17 (07/17/18 1122)  BP: (!) 169/72 (07/17/18 0904)  SpO2: 100 % (07/17/18 1122) Vital Signs (24h Range):  Temp:  [98.1 °F (36.7 °C)-100 °F (37.8 °C)] 98.1 °F (36.7 °C)  Pulse:  [71-91] 84  Resp:  [4-28] 17  SpO2:  [99 %-100 %] 100 %  BP: (132-174)/(58-86) 169/72     Weight: 52 kg (114 lb 10.2 oz)  Body mass index is 23.15 kg/m².    Intake/Output Summary (Last 24 hours) at 07/17/18 1143  Last data filed at 07/17/18 0705   Gross per 24 hour   Intake              675 ml   Output                0 ml   Net              675 ml      Physical Exam   Constitutional: She appears well-developed. She is intubated.   HENT:   Head: Normocephalic.   ET and OG tubes in place.   Neck: No thyromegaly present.   Cardiovascular: Normal rate and regular rhythm.  Exam reveals no friction rub.    Pulmonary/Chest: Breath sounds normal. She is intubated. She has no wheezes. She exhibits no tenderness.   Abdominal: Soft. Bowel sounds are normal. She exhibits no distension. There is no tenderness.   Musculoskeletal: She exhibits no edema.   Lymphadenopathy:     She has no  cervical adenopathy.   Neurological: She is unresponsive.   Skin: Skin is warm and dry. No rash noted.       Significant Labs: All pertinent labs within the past 24 hours have been reviewed.    Significant Imaging: I have reviewed all pertinent imaging results/findings within the past 24 hours.    Assessment/Plan:      * Acute encephalopathy    Hypoxic encephalopathy vs metabolic in setting sepsis  Continue antibotics   Neurology following   7/17  No improvement in mentation after treating infection most likely hypoxic brain injury from cardiac arrest           Hyperkalemia    Dialysis today         Septicemia due to Acinetobacter species    Continue with unsiyn         E. coli urinary tract infection    Continue with ciprofloxcin   Urine culture e coli           Deep tissue injury              Type 2 diabetes mellitus with hyperglycemia, without long-term current use of insulin    Continue with iss   And follow hba1c           Acute respiratory failure with hypoxia    -acute hyper capneic and hypoxic respiratory failure   - in setting of multilobar pneumonia   - continue with broad spectrum antibotics and follow up on cultures   - pe rule out with cta  7/15  Pneumonia continue with antibotics         HAP (hospital-acquired pneumonia)    -dialysis patient with multilobar pneumonia  - continue with broad spectrum antibotics   -follow up on cultures   - continue with ventilator support   7/12  Continue zosyn and vanco   Gram negative rods bactermia   7/13  Continue with unasyn   7/14  Continue with unasyn/ciprofloxcin   7/15   Acinetobacter in respiratory culture and blood culture acinetobacter continue with ampicillin-sulbactim and ciprofloxcin           Cardiac arrest    -initial rhythm as per ed notes PEA  - two rounds of cpr with rosc   - echo with right heart strain and elevated PA and increase central vein pressure pe was rule out cta negative   - respiratory arrest in setting of pneumonia  7/12  No improvement  in mental status   Will continue with vent support   7/13  No improvement in mental status neurology on board   7/15  Waiting on family recommendation   7/17  No improvement in mental status after cardiac arrest -hypoxic brain injury .   pnuemonia leading respiratiory arrest -cardiac arrest two rounds of cpr .         Hypertension    esrd on hemodialysis   Lopressor bid         Thrombocytopenia              ESRD on hemodialysis    -nephrology on board   - case discussed with  nephrology   7/12  Nephrology on board currently getting dialysis   7/14  On dialysis   7/17  For hyperkalemia         Type II or unspecified type diabetes mellitus with renal manifestations, not stated as uncontrolled(250.40)                VTE Risk Mitigation         Ordered     heparin (porcine) injection 5,000 Units  Every 8 hours      07/11/18 1811          Critical care time spent on the evaluation and treatment of severe organ dysfunction, review of pertinent labs and imaging studies, discussions with consulting providers and discussions with patient/family: 40 minutes.    Zayda England MD  Department of Hospital Medicine   [facility]

## 2018-07-17 NOTE — SUBJECTIVE & OBJECTIVE
Interval History:    7/17/18: Patient remains intubated and unresponsive. Patient is DNR.         Review of patient's allergies indicates:   Allergen Reactions    Tylenol [acetaminophen] Other (See Comments)     Headache     Current Facility-Administered Medications   Medication Frequency    acetylcysteine 100 mg/ml (10%) solution 4 mL BID    albumin human 25% bottle 12.5 g PRN    albuterol-ipratropium 2.5 mg-0.5 mg/3 mL nebulizer solution 3 mL Q6H    ampicillin-sulbactam 3 g in sodium chloride 0.9 % 100 mL IVPB (ready to mix system) Q24H    bisacodyl suppository 10 mg Daily PRN    chlorhexidine 0.12 % solution 15 mL BID    ciprofloxacin (CIPRO)400mg/200ml D5W IVPB 400 mg Q24H    dextrose 50% injection 12.5 g PRN    famotidine (PF) injection 20 mg Daily    glucagon (human recombinant) injection 1 mg PRN    heparin (porcine) injection 5,000 Units Q8H    insulin aspart U-100 pen 0-5 Units Q6H PRN    levETIRAcetam in NaCl (iso-os) IVPB 500 mg Q12H    levETIRAcetam in NaCl (iso-os) IVPB 500 mg Every Mon, Wed, Fri    metoprolol tartrate (LOPRESSOR) tablet 50 mg BID    polyethylene glycol packet 17 g Daily       Objective:     Vital Signs (Most Recent):  Temp: 98.4 °F (36.9 °C) (07/17/18 0305)  Pulse: 87 (07/17/18 0834)  Resp: (!) 9 (07/17/18 0834)  BP: (!) 173/73 (07/17/18 0600)  SpO2: 100 % (07/17/18 0834)  O2 Device (Oxygen Therapy): ventilator (07/17/18 0834) Vital Signs (24h Range):  Temp:  [98.4 °F (36.9 °C)-100 °F (37.8 °C)] 98.4 °F (36.9 °C)  Pulse:  [71-93] 87  Resp:  [4-28] 9  SpO2:  [99 %-100 %] 100 %  BP: (132-174)/(58-83) 173/73     Weight: 52 kg (114 lb 10.2 oz) (07/16/18 0500)  Body mass index is 23.15 kg/m².  Body surface area is 1.47 meters squared.    I/O last 3 completed shifts:  In: 1470 [NG/GT:570; IV Piggyback:900]  Out: -     Physical Exam   Constitutional: She appears well-developed. She is intubated.   HENT:   Head: Normocephalic.   ET and OG tubes in place.   Neck: No thyromegaly  present.   Cardiovascular: Normal rate and regular rhythm.  Exam reveals no friction rub.    Pulmonary/Chest: Breath sounds normal. She is intubated. She has no wheezes. She exhibits no tenderness.   Abdominal: Soft. Bowel sounds are normal. She exhibits no distension. There is no tenderness.   Musculoskeletal: She exhibits no edema.   Lymphadenopathy:     She has no cervical adenopathy.   Neurological: She is unresponsive.   Skin: Skin is warm and dry. No rash noted.       Significant Labs:  Lab Results   Component Value Date    CREATININE 5.7 (H) 07/17/2018     (H) 07/17/2018     07/17/2018    K 5.3 (H) 07/17/2018     07/17/2018    CO2 19 (L) 07/17/2018     Lab Results   Component Value Date    CALCIUM 9.8 07/17/2018    PHOS 4.7 (H) 07/17/2018     Lab Results   Component Value Date    ALBUMIN 2.1 (L) 07/17/2018     Lab Results   Component Value Date    WBC 13.65 (H) 07/17/2018    HGB 10.0 (L) 07/17/2018    HCT 33.0 (L) 07/17/2018    MCV 78 (L) 07/17/2018    PLT 94 (L) 07/17/2018       Recent Labs  Lab 07/17/18  0333   MG 2.8*         Significant Imaging:  Imaging Results          CTA Chest Non Coronary (Final result)  Result time 07/11/18 17:46:11    Final result by Adolfo Douglas Jr., MD (07/11/18 17:46:11)                 Impression:      No pulmonary embolus.    Multilobar pneumonia.    Enlargement of the pulmonary arteries consistent with pulmonary artery hypertension.    Marked cardiomegaly.    All CT scan at this facility use dose modulation, iterative reconstruction, and/or weight base dosing when appropriate to reduce radiation dose to as low as reasonably achievable.      Electronically signed by: Adolfo Douglas Jr., MD  Date:    07/11/2018  Time:    17:46             Narrative:    EXAMINATION:  CTA CHEST NON CORONARY    CLINICAL HISTORY:  Chest pain, acute, PE suspected, high pretest prob;    TECHNIQUE:  Axial CTA images performed through the chest after the administration of 100 cc  intravenous contrast. Maximum intensity projections were performed and interpreted.    FINDINGS:  There is no evidence of pulmonary embolus. Pulmonary arteries are enlarged with the main pulmonary artery measuring 4.5 cm.  The heart is markedly enlarged with dense atherosclerotic calcifications of the coronary arteries.  The great vessels are patent.  Endotracheal tube in satisfactory position.  No mediastinal adenopathy.  Probable hemodialysis graft partially visualized the left upper extremity.    Extensive areas of consolidation involving the left upper and lower lobes.  There is also consolidation in the medial right lower lobe and patchy areas of ground-glass opacification within the right middle lobe.  Visualized abdominal organs are unremarkable.  Osseous structures are intact.  No effusion or pneumothorax.                               CT Head Without Contrast (Final result)  Result time 07/11/18 17:19:21    Final result by Adolfo Douglas Jr., MD (07/11/18 17:19:21)                 Impression:      1. No acute intracranial findings.  Stable exam.  All CT scans at this facility are performed  using dose modulation techniques as appropriate to performed exam including the following:  automated exposure control; adjustment of mA and/or kV according to the patients size (this includes techniques or standardized protocols for targeted exams where dose is matched to indication/reason for exam: i.e. extremities or head);  iterative reconstruction technique.      Electronically signed by: Adolfo Douglas Jr., MD  Date:    07/11/2018  Time:    17:19             Narrative:    EXAMINATION:  CT HEAD WITHOUT CONTRAST    CLINICAL HISTORY:  Decreased alertness;    TECHNIQUE:  CT scan was obtained of the head without administration of contrast.    COMPARISON:  07/11/2018.    FINDINGS:  Stable atrophy with periventricular white matter changes.  No extra-axial acute fluid collection.Ventricles and basal cisterns are normal.   No hemorrhage, mass effect or midline shift.  No other cerebral or cerebellar parenchymal abnormality.  Paranasal sinuses are clear.  Mastoid air cells are clear.  Calvarium is intact.                               X-Ray Chest AP Portable (Final result)  Result time 07/11/18 15:17:22    Final result by Vamshi Lynne MD (07/11/18 15:17:22)                 Impression:      As above.      Electronically signed by: Vamshi Lynne MD  Date:    07/11/2018  Time:    15:17             Narrative:    EXAMINATION:  XR CHEST AP PORTABLE    CLINICAL HISTORY:  post intubation; respiratory distress    TECHNIQUE:  AP view of the chest was performed.    COMPARISON:  Earlier today.    FINDINGS:  Endotracheal tube in satisfactory position.  Worsening left upper lobe pneumonia.                               X-Ray Foot Complete Left (Final result)  Result time 07/11/18 13:18:19    Final result by Vamshi Lynne MD (07/11/18 13:18:19)                 Impression:      Charcot arthropathy.  Superimposed osteomyelitis is not excluded.  Consider MRI correlation.      Electronically signed by: Vamshi Lynne MD  Date:    07/11/2018  Time:    13:18             Narrative:    EXAMINATION:  XR FOOT COMPLETE 3 VIEW LEFT    CLINICAL HISTORY:  Osteomyelitis, unspecified.   Pain.    TECHNIQUE:  3 views.    COMPARISON:  None    FINDINGS:  Severe osteopenia.  Extensive vascular calcifications.  Plantar subluxation of the midfoot.  Sclerosis and partial collapse of the middle cuneiform.  Extensive erosive changes throughout the midfoot.  Diffuse soft tissue swelling.  Prominent plantar spur.                               X-Ray Chest AP Portable (Final result)  Result time 07/11/18 13:15:44    Final result by Vamshi Lynne MD (07/11/18 13:15:44)                 Impression:      Left upper lobe pneumonia.  Recommend close follow-up after treatment.      Electronically signed by: Vamshi Lynne MD  Date:    07/11/2018  Time:    13:15             Narrative:     EXAMINATION:  XR CHEST AP PORTABLE    CLINICAL HISTORY:  cough;    TECHNIQUE:  AP view of the chest was performed.    COMPARISON:  October 2, 2017    FINDINGS:  Left upper lobe consolidation.    Stable cardiomegaly.  Aortic atherosclerosis.  No acute osseous findings demonstrated.                               US Lower Extremity Veins Bilateral (Final result)  Result time 07/11/18 13:15:04    Final result by Vamshi Lynne MD (07/11/18 13:15:04)                 Impression:      Negative for bilateral lower extremity DVT.  Limited visualization of the left calf veins.      Electronically signed by: Vamshi Lynne MD  Date:    07/11/2018  Time:    13:15             Narrative:    EXAMINATION:  US LOWER EXTREMITY VEINS BILATERAL    CLINICAL HISTORY:  Edema, unspecified    FINDINGS:  Grayscale and color Doppler sonography was formed through the bilateral lower extremities.    The visualized bilateral lower extremity veins are widely patent with normal augmentation, and compressibility and respiratory variability.  The left leg and calf veins were not visualized secondary to use marked soft tissue edema and skin thickening.  No evidence of fluid collection.                               CT Head Without Contrast (Final result)  Result time 07/11/18 12:18:25    Final result by Benedict Henderson MD (07/11/18 12:18:25)                 Impression:      Chronic microvascular ischemic changes.      Electronically signed by: Benedict Henderson MD  Date:    07/11/2018  Time:    12:18             Narrative:    EXAMINATION:  CT HEAD WITHOUT CONTRAST    CLINICAL HISTORY:  ams;    TECHNIQUE:  Low dose axial CT images obtained throughout the head without intravenous contrast. Sagittal and coronal reconstructions were performed.  All CT scans at this facility use dose modulation, iterative reconstruction and/or weight based dosing when appropriate to reduce radiation dose to as low as reasonably  achievable.    COMPARISON:  03/14/2016    FINDINGS:  Intracranial compartment:    The brain parenchyma demonstrates areas of decreased attenuation with mild to moderate periventricular white matter consistent with chronic microvascular ischemic changes..  No parenchymal mass, hemorrhage, edema or major vascular distribution infarct.  Vascular calcifications are noted.    Mild prominence of the sulci and ventricles are consistent with age-related involutional changes.    No extra-axial blood or fluid collections.    Skull/extracranial contents (limited evaluation): No fracture. Mastoid air cells and paranasal sinuses are essentially clear.

## 2018-07-17 NOTE — PROGRESS NOTES
Called to see pt regarding new skin breakdown- new DTIs noted to right buttock and bilat lower legs-  New skin tear to right buttock - skin tear has already been painted with cavilon and foam dressing applied- pt is already on specialty bed- will continue with q2h turns and current treatment- family is discussing withdrawal of care

## 2018-07-17 NOTE — ASSESSMENT & PLAN NOTE
Patient on MWF schedule as outpatient. Very non-compliant with outpatient HD treatment. Will discuss with ICU team and family whether HD is still indicated/wanted.     Access: Left arm AVF.

## 2018-07-17 NOTE — ASSESSMENT & PLAN NOTE
-nephrology on board   - case discussed with dr gabriel   7/12  Nephrology on board currently getting dialysis   7/14  On dialysis   7/17  For hyperkalemia

## 2018-07-17 NOTE — ASSESSMENT & PLAN NOTE
Hypoxic vs Toxic vs Metabolic or likely multifactorial  Neuro following  No neuro improvement and has not had any sedation  MRI head pending family decision of aggressive vs comfort care   Keppra per Neuro

## 2018-07-17 NOTE — ASSESSMENT & PLAN NOTE
-initial rhythm as per ed notes PEA  - two rounds of cpr with rosc   - echo with right heart strain and elevated PA and increase central vein pressure pe was rule out cta negative   - respiratory arrest in setting of pneumonia  7/12  No improvement in mental status   Will continue with vent support   7/13  No improvement in mental status neurology on board   7/15  Waiting on family recommendation   7/17  No improvement in mental status after cardiac arrest -hypoxic brain injury .   pnuemonia leading respiratiory arrest -cardiac arrest two rounds of cpr .

## 2018-07-17 NOTE — SUBJECTIVE & OBJECTIVE
Review of Systems   Unable to perform ROS: Intubated       Objective:     Vital Signs (Most Recent):  Temp: 98.4 °F (36.9 °C) (07/17/18 0305)  Pulse: 84 (07/17/18 0712)  Resp: 15 (07/17/18 0712)  BP: (!) 173/73 (07/17/18 0600)  SpO2: 100 % (07/17/18 0712) Vital Signs (24h Range):  Temp:  [98.4 °F (36.9 °C)-100 °F (37.8 °C)] 98.4 °F (36.9 °C)  Pulse:  [71-93] 84  Resp:  [4-28] 15  SpO2:  [99 %-100 %] 100 %  BP: (132-174)/(58-83) 173/73     Weight: 52 kg (114 lb 10.2 oz)  Body mass index is 23.15 kg/m².      Intake/Output Summary (Last 24 hours) at 07/17/18 0740  Last data filed at 07/16/18 2129   Gross per 24 hour   Intake              850 ml   Output                0 ml   Net              850 ml       Physical Exam   Constitutional: She appears well-developed. She appears cachectic.  Non-toxic appearance. She has a sickly appearance. She appears ill. No distress. She is intubated.   HENT:   Head: Normocephalic and atraumatic.   Mouth/Throat: Oropharynx is clear and moist and mucous membranes are normal.   Eyes: EOM and lids are normal.   Pupils sluggish   Neck: Trachea normal. Neck supple. Carotid bruit is not present.   Cardiovascular: Normal rate, regular rhythm and normal heart sounds.    Pulses:       Radial pulses are 2+ on the right side, and 2+ on the left side.        Dorsalis pedis pulses are 1+ on the right side, and 1+ on the left side.   Pulmonary/Chest: Effort normal. No accessory muscle usage. She is intubated. No respiratory distress. She has decreased breath sounds. She has rhonchi (improved post suction of OET of thick yellow secretions).   Abdominal: Soft. Normal appearance. She exhibits no distension. Bowel sounds are decreased. There is no tenderness.   Musculoskeletal:        Right foot: There is no deformity.        Left foot: There is no deformity.   Mild edema   Feet:   Right Foot:   Skin Integrity: Positive for skin breakdown.   Left Foot:   Skin Integrity: Positive for skin breakdown.    Lymphadenopathy:     She has no cervical adenopathy.   Neurological: She is unresponsive.   Weak corneal reflex and pupils sluggish.  Spont opens eyes to stimuli but inconsistent and weak withdrawal of LEs to pain but no withdrawal of UEs to pain. No spont or purposeful movements off any sedation.    Skin: Skin is warm and dry. Capillary refill takes less than 2 seconds. No rash noted. No cyanosis.            Vents:  Vent Mode: A/C (07/17/18 0712)  Ventilator Initiated: Yes (07/11/18 1454)  Set Rate: 16 bmp (07/17/18 0712)  Vt Set: 350 mL (07/17/18 0712)  Pressure Support: 0 cmH20 (07/17/18 0712)  PEEP/CPAP: 8 cmH20 (07/17/18 0712)  Oxygen Concentration (%): 35 (07/17/18 0712)  Peak Airway Pressure: 32 cmH2O (07/17/18 0712)  Plateau Pressure: 0 cmH20 (07/17/18 0712)  Total Ve: 15.6 mL (07/17/18 0712)  F/VT Ratio<105 (RSBI): (!) 15.4 (07/17/18 0712)    Lines/Drains/Airways     Drain                 Hemodialysis AV Fistula Left upper arm -- days         NG/OG Tube 07/11/18 1811 orogastric 18 Fr. Left mouth 5 days          Airway                 Airway - Non-Surgical 07/11/18 1426 Endotracheal Tube 5 days          Pressure Ulcer                 Pressure Injury 07/12/18 Left Heel Deep tissue injury 5 days         Pressure Injury 07/12/18 Right Heel Deep tissue injury 5 days          Peripheral Intravenous Line                 Peripheral IV - Single Lumen 07/11/18 1240 Right Forearm 5 days         Peripheral IV - Single Lumen 07/11/18 1438 Right  5 days                Significant Labs:    CBC/Anemia Profile:    Recent Labs  Lab 07/16/18  0405 07/17/18  0333   WBC 10.74 13.65*   HGB 9.8* 10.0*   HCT 33.0* 33.0*   PLT 93* 94*   MCV 80* 78*   RDW 19.7* 19.7*        Chemistries:    Recent Labs  Lab 07/16/18  0405 07/17/18  0333    140   K 4.2 5.3*   CL 99 101   CO2 23 19*   BUN 85* 104*   CREATININE 5.3* 5.7*   CALCIUM 9.8 9.8   ALBUMIN 2.3* 2.1*   PROT 6.6 6.8   BILITOT 0.8 0.8   ALKPHOS 180* 149*   ALT 13 12   AST  20 23   MG 2.5 2.8*   PHOS 3.6 4.7*       ABGs:   Recent Labs  Lab 07/17/18  0448   PH 7.433   PCO2 41.2   HCO3 27.6   POCSATURATED 98   BE 3     All pertinent labs within the past 24 hours have been reviewed.    Significant Imaging:  CXR: I have reviewed all pertinent results/findings within the past 24 hours and my personal findings are:  no change.  Persistent ROSLYN and LLL infiltrates

## 2018-07-17 NOTE — ASSESSMENT & PLAN NOTE
Vent settings reviewed   Cont full vent support  SBT daily  Plan Trach/PEG this week if family desires to continue aggressive Rx, decision per spouse tomorrow

## 2018-07-17 NOTE — ASSESSMENT & PLAN NOTE
Hypoxic encephalopathy vs metabolic in setting sepsis  Continue antibotics   Neurology following   7/17  No improvement in mentation after treating infection most likely hypoxic brain injury from cardiac arrest

## 2018-07-18 VITALS
HEIGHT: 59 IN | WEIGHT: 114.63 LBS | RESPIRATION RATE: 30 BRPM | OXYGEN SATURATION: 87 % | HEART RATE: 85 BPM | BODY MASS INDEX: 23.11 KG/M2 | DIASTOLIC BLOOD PRESSURE: 72 MMHG | SYSTOLIC BLOOD PRESSURE: 170 MMHG | TEMPERATURE: 98 F

## 2018-07-18 PROBLEM — Z51.5 COMFORT MEASURES ONLY STATUS: Status: ACTIVE | Noted: 2018-07-18

## 2018-07-18 LAB
ALBUMIN SERPL BCP-MCNC: 2 G/DL
ALP SERPL-CCNC: 149 U/L
ALT SERPL W/O P-5'-P-CCNC: 11 U/L
ANION GAP SERPL CALC-SCNC: 19 MMOL/L
AST SERPL-CCNC: 13 U/L
BASOPHILS # BLD AUTO: 0.1 K/UL
BASOPHILS NFR BLD: 0.9 %
BILIRUB SERPL-MCNC: 0.6 MG/DL
BUN SERPL-MCNC: 113 MG/DL
CALCIUM SERPL-MCNC: 9.7 MG/DL
CHLORIDE SERPL-SCNC: 98 MMOL/L
CO2 SERPL-SCNC: 21 MMOL/L
CREAT SERPL-MCNC: 6.2 MG/DL
DIFFERENTIAL METHOD: ABNORMAL
EOSINOPHIL # BLD AUTO: 0.2 K/UL
EOSINOPHIL NFR BLD: 2 %
ERYTHROCYTE [DISTWIDTH] IN BLOOD BY AUTOMATED COUNT: 20.2 %
EST. GFR  (AFRICAN AMERICAN): 7 ML/MIN/1.73 M^2
EST. GFR  (NON AFRICAN AMERICAN): 6 ML/MIN/1.73 M^2
GIANT PLATELETS BLD QL SMEAR: PRESENT
GLUCOSE SERPL-MCNC: 171 MG/DL
HCT VFR BLD AUTO: 34.3 %
HGB BLD-MCNC: 10.5 G/DL
LYMPHOCYTES # BLD AUTO: 1.4 K/UL
LYMPHOCYTES NFR BLD: 12 %
MAGNESIUM SERPL-MCNC: 3.2 MG/DL
MCH RBC QN AUTO: 24.1 PG
MCHC RBC AUTO-ENTMCNC: 30.6 G/DL
MCV RBC AUTO: 79 FL
MONOCYTES # BLD AUTO: 1.4 K/UL
MONOCYTES NFR BLD: 12.3 %
NEUTROPHILS # BLD AUTO: 8.4 K/UL
NEUTROPHILS NFR BLD: 75.1 %
PHOSPHATE SERPL-MCNC: 5.3 MG/DL
PLATELET # BLD AUTO: 138 K/UL
PLATELET BLD QL SMEAR: ABNORMAL
PMV BLD AUTO: ABNORMAL FL
POCT GLUCOSE: 199 MG/DL (ref 70–110)
POCT GLUCOSE: 241 MG/DL (ref 70–110)
POTASSIUM SERPL-SCNC: 4.3 MMOL/L
PROT SERPL-MCNC: 6.6 G/DL
RBC # BLD AUTO: 4.36 M/UL
SODIUM SERPL-SCNC: 138 MMOL/L
WBC # BLD AUTO: 11.53 K/UL

## 2018-07-18 PROCEDURE — 27000221 HC OXYGEN, UP TO 24 HOURS

## 2018-07-18 PROCEDURE — 63600175 PHARM REV CODE 636 W HCPCS: Performed by: PSYCHIATRY & NEUROLOGY

## 2018-07-18 PROCEDURE — 63600175 PHARM REV CODE 636 W HCPCS: Performed by: NURSE PRACTITIONER

## 2018-07-18 PROCEDURE — 99291 CRITICAL CARE FIRST HOUR: CPT | Mod: ,,, | Performed by: NURSE PRACTITIONER

## 2018-07-18 PROCEDURE — 27200966 HC CLOSED SUCTION SYSTEM

## 2018-07-18 PROCEDURE — 99900035 HC TECH TIME PER 15 MIN (STAT)

## 2018-07-18 PROCEDURE — 94668 MNPJ CHEST WALL SBSQ: CPT

## 2018-07-18 PROCEDURE — 99900026 HC AIRWAY MAINTENANCE (STAT)

## 2018-07-18 PROCEDURE — 25000003 PHARM REV CODE 250: Performed by: NURSE PRACTITIONER

## 2018-07-18 PROCEDURE — 20000000 HC ICU ROOM

## 2018-07-18 PROCEDURE — 84100 ASSAY OF PHOSPHORUS: CPT

## 2018-07-18 PROCEDURE — S0028 INJECTION, FAMOTIDINE, 20 MG: HCPCS | Performed by: INTERNAL MEDICINE

## 2018-07-18 PROCEDURE — 63600175 PHARM REV CODE 636 W HCPCS: Performed by: INTERNAL MEDICINE

## 2018-07-18 PROCEDURE — 83735 ASSAY OF MAGNESIUM: CPT

## 2018-07-18 PROCEDURE — 25000242 PHARM REV CODE 250 ALT 637 W/ HCPCS: Performed by: NURSE PRACTITIONER

## 2018-07-18 PROCEDURE — 25000003 PHARM REV CODE 250: Performed by: INTERNAL MEDICINE

## 2018-07-18 PROCEDURE — 99291 CRITICAL CARE FIRST HOUR: CPT | Mod: ,,, | Performed by: INTERNAL MEDICINE

## 2018-07-18 PROCEDURE — 94640 AIRWAY INHALATION TREATMENT: CPT

## 2018-07-18 PROCEDURE — 85025 COMPLETE CBC W/AUTO DIFF WBC: CPT

## 2018-07-18 PROCEDURE — 36415 COLL VENOUS BLD VENIPUNCTURE: CPT

## 2018-07-18 PROCEDURE — 94003 VENT MGMT INPAT SUBQ DAY: CPT

## 2018-07-18 PROCEDURE — 80053 COMPREHEN METABOLIC PANEL: CPT

## 2018-07-18 PROCEDURE — 99900017 HC EXTUBATION W/PARAMETERS (STAT)

## 2018-07-18 RX ORDER — MORPHINE SULFATE 2 MG/ML
6 INJECTION, SOLUTION INTRAMUSCULAR; INTRAVENOUS EVERY 30 MIN PRN
Status: DISCONTINUED | OUTPATIENT
Start: 2018-07-18 | End: 2018-07-19 | Stop reason: HOSPADM

## 2018-07-18 RX ADMIN — IPRATROPIUM BROMIDE AND ALBUTEROL SULFATE 3 ML: .5; 3 SOLUTION RESPIRATORY (INHALATION) at 12:07

## 2018-07-18 RX ADMIN — IPRATROPIUM BROMIDE AND ALBUTEROL SULFATE 3 ML: .5; 3 SOLUTION RESPIRATORY (INHALATION) at 07:07

## 2018-07-18 RX ADMIN — CHLORHEXIDINE GLUCONATE 15 ML: 1.2 RINSE ORAL at 09:07

## 2018-07-18 RX ADMIN — METOPROLOL TARTRATE 50 MG: 50 TABLET, FILM COATED ORAL at 09:07

## 2018-07-18 RX ADMIN — ACETYLCYSTEINE 4 ML: 100 SOLUTION ORAL; RESPIRATORY (INHALATION) at 07:07

## 2018-07-18 RX ADMIN — INSULIN ASPART 1 UNITS: 100 INJECTION, SOLUTION INTRAVENOUS; SUBCUTANEOUS at 12:07

## 2018-07-18 RX ADMIN — HEPARIN SODIUM 5000 UNITS: 5000 INJECTION, SOLUTION INTRAVENOUS; SUBCUTANEOUS at 05:07

## 2018-07-18 RX ADMIN — LEVETIRACETAM 500 MG: 5 INJECTION INTRAVENOUS at 09:07

## 2018-07-18 RX ADMIN — FAMOTIDINE 20 MG: 10 INJECTION INTRAVENOUS at 09:07

## 2018-07-18 RX ADMIN — Medication 6 MG: at 01:07

## 2018-07-18 NOTE — PLAN OF CARE
Problem: Patient Care Overview  Goal: Plan of Care Review  Outcome: Ongoing (interventions implemented as appropriate)  Patient remains on vent and tube feedings this a.m.  No acute changes noted to patient status during the night.  See MAR for medication doses and times of administration.

## 2018-07-18 NOTE — PLAN OF CARE
Problem: Patient Care Overview  Goal: Plan of Care Review  Outcome: Ongoing (interventions implemented as appropriate)  Pt on O2 tolerating well.

## 2018-07-18 NOTE — PROGRESS NOTES
[facility] Hospital Medicine  Progress Note    Patient Name: Talia Mcfarland  MRN: [id number]  Patient Class: IP- Inpatient   Admission Date: 7/11/2018  Length of Stay: 7 days  Attending Physician: Yamel Mc MD  Primary Care Provider: Tank Castro MD        Subjective:     Principal Problem:Acute encephalopathy    HPI:  Talia Mcfarland is a 66 y.o. female patient with PMHx of ESRD on HD, DM2, HTN, PVD, who presented to the ER from dialysis center with alert mental status . At dialysis center patient was receiving  First dose of vancomycin  For possible infection . She was confused so was transferred to ed . History obtained from electronic records no family bedside . 13 30 pm patient was seen by ed physician with confusion but awake . Later around 14 25 she was found not responsive bradycardiac and show breathing weak pulse was moved to ed room 4 from room 14 . Was intubated and after that was found to have no pulse(pea) cpr was started with two rounds , two epi,one atropine / calcium and bicarb  . After ROSc ekg done was with no acute changes .  . Not following commands but randomly moving extremities and opening eyes . Stat echo was done which showed right heart strain with possibility of PE . So cta was ordered . Also started on broad spectrum antibotic culture drawn     Hospital Course:  7/12  Patient seen and examined today .she is only fentanyl drip . no improvement in mental status after cardiac arrest . Gram negative bactermia . Continue to be ventilator   7/13  Patient has ACINETOBACTER SPECIES in blood and e coli in urine .infectious disease was informed recommended Unasyn and vancomycin as per pharmacy  . Neurology consulted for mental status after cardiac arrest   7/14  Patient was seen and examined today. Discuss plan of care with family . Minimal brain stem reflexes . No significant improvement after cardiac arrest -hypoxic brain injury . Started ciprofloxacin  For e coli.    7/15  Waiting on family decision . Patient no improvement in mental status . She had seizure like received ativan as per e icu last night . Repetetive eyelid blinking can be related hypoxic brain injury after cardiac arrest   7/16  Patient was seen and examined today . No improvement in mental status .prognosis poor explained to the daughter bedside . Waiting on  to make final decision   7/17  Waiting on  decision he mention he will decided tomorrow. More inclined towards hospice . Patient expressed her wishes in the past didn't want long term vent and life support . Continue treatment for acinetobacter pneumonia and e coli uti . Hyperkalemia today will get dialysis today   07/18  Comfort care         Review of Systems   Unable to perform ROS: Intubated     Objective:     Vital Signs (Most Recent):  Temp: 97.8 °F (36.6 °C) (07/18/18 0315)  Pulse: 85 (07/18/18 1148)  Resp: 19 (07/18/18 1148)  BP: (!) 152/64 (07/18/18 0701)  SpO2: 100 % (07/18/18 1148) Vital Signs (24h Range):  Temp:  [97.8 °F (36.6 °C)-98.8 °F (37.1 °C)] 97.8 °F (36.6 °C)  Pulse:  [72-88] 85  Resp:  [9-30] 19  SpO2:  [99 %-100 %] 100 %  BP: (134-203)/() 152/64     Weight: 52 kg (114 lb 10.2 oz)  Body mass index is 23.15 kg/m².    Intake/Output Summary (Last 24 hours) at 07/18/18 1239  Last data filed at 07/18/18 0600   Gross per 24 hour   Intake             1350 ml   Output             2500 ml   Net            -1150 ml      Physical Exam   Constitutional: She appears well-developed. She is intubated.   HENT:   Head: Normocephalic.   ET and OG tubes in place.   Neck: No thyromegaly present.   Cardiovascular: Normal rate and regular rhythm.  Exam reveals no friction rub.    Pulmonary/Chest: Breath sounds normal. She is intubated. She has no wheezes. She exhibits no tenderness.   Abdominal: Soft. Bowel sounds are normal. She exhibits no distension. There is no tenderness.   Musculoskeletal: She exhibits no edema.   Lymphadenopathy:      She has no cervical adenopathy.   Neurological: She is unresponsive.   Skin: Skin is warm and dry. No rash noted.       Significant Labs: All pertinent labs within the past 24 hours have been reviewed.    Significant Imaging: I have reviewed all pertinent imaging results/findings within the past 24 hours.    Assessment/Plan:      * Acute encephalopathy    No improvement in mentation after treating infection most likely hypoxic brain injury from cardiac arrest           Hyperkalemia    Dialysis today         Septicemia due to Acinetobacter species    Continue with unasyn         E. coli urinary tract infection    Continue with ciprofloxcin   Urine culture e coli           Deep tissue injury              Type 2 diabetes mellitus with hyperglycemia, without long-term current use of insulin    Continue with iss           Acute respiratory failure with hypoxia    -acute hyper capneic and hypoxic respiratory failure   - in setting of multilobar pneumonia   - VENT management per critical care        HAP (hospital-acquired pneumonia)    -dialysis patient with multilobar pneumonia  - continue with broad spectrum antibotics   -Acinetobacter in respiratory culture and blood culture acinetobacter continue with ampicillin-sulbactim and ciprofloxcin           Cardiac arrest    -initial rhythm as per ed notes PEA  - two rounds of cpr with rosc   - echo with right heart strain and elevated PA and increase central vein pressure pe was rule out cta negative   -No improvement in mental status after cardiac arrest -hypoxic brain injury .           Hypertension    ESRD on hemodialysis   Lopressor bid         Thrombocytopenia              ESRD on hemodialysis    Noncompliant with dialysis          Type II or unspecified type diabetes mellitus with renal manifestations, not stated as uncontrolled(250.40)                VTE Risk Mitigation     None          Critical care time spent on the evaluation and treatment of severe organ  dysfunction, review of pertinent labs and imaging studies, discussions with consulting providers and discussions with patient/family: 40 minutes.    Yamel Mc MD  Department of Hospital Medicine   [facility]

## 2018-07-18 NOTE — DISCHARGE SUMMARY
[facility] Hospital Medicine  Discharge Summary      Patient Name: Talia Mcfarland  MRN: [id number]  Admission Date: 7/11/2018  Hospital Length of Stay: 7 days  Discharge Date and Time:  07/18/2018 5:04 PM  Attending Physician: Yamel Mc MD   Discharging Provider: Yamel Mc MD  Primary Care Provider: Tank Castro MD      HPI:   Talia Mcfarland is a 66 y.o. female patient with PMHx of ESRD on HD, DM2, HTN, PVD, who presented to the ER from dialysis center with alert mental status . At dialysis center patient was receiving  First dose of vancomycin  For possible infection . She was confused so was transferred to ed . History obtained from electronic records no family bedside . 13 30 pm patient was seen by ed physician with confusion but awake . Later around 14 25 she was found not responsive bradycardiac and show breathing weak pulse was moved to ed room 4 from room 14 . Was intubated and after that was found to have no pulse(pea) cpr was started with two rounds , two epi,one atropine / calcium and bicarb  . After ROSc ekg done was with no acute changes .  . Not following commands but randomly moving extremities and opening eyes . Stat echo was done which showed right heart strain with possibility of PE . So cta was ordered . Also started on broad spectrum antibotic culture drawn     * No surgery found *      Hospital Course:   S/P cardiac arrest admitted with ICU with cardiac arrest ,anoxic brain injury ,  ACINETOBACTER SPECIES bacteremia and Ecoli in the urine.    Ms Mcfarland  had continued clinical  decline with poor prognosis despite aggressive treatment .   Patient was extubated today and Hospice was consulted for comfort care .   She will be discharged to inpatient hospice the [facility] today pending signed consent      Consults:   Consults         Status Ordering Provider     Inpatient consult to Cardiology  Once     Provider:  Manny Avina MD    Completed CHIN MCMAHON  BAILEY.     Inpatient consult to Nephrology  Once     Provider:  Riky Bautista MD    Completed CHIN MCMAHON     Inpatient consult to Neurology  Once     Provider:  Nirav Veronica MD PhD    Completed CHIN MCMAHON     Inpatient consult to Pulmonology  Once     Provider:  (Not yet assigned)    Completed ANGELIA BENITEZ     Inpatient consult to Registered Dietitian/Nutritionist  Once     Provider:  (Not yet assigned)    Completed WINSOME HOOKS          No new Assessment & Plan notes have been filed under this hospital service since the last note was generated.  Service: Hospital Medicine    Final Active Diagnoses:    Diagnosis Date Noted POA    PRINCIPAL PROBLEM:  Acute encephalopathy [G93.40] 07/14/2018 Yes    Hyperkalemia [E87.5] 07/17/2018 Unknown    Septicemia due to Acinetobacter species [A41.59] 07/15/2018 Yes    Hx of noncompliance with medical treatment, presenting hazards to health [Z91.19] 07/15/2018 Not Applicable    E. coli urinary tract infection [N39.0, B96.20] 07/14/2018 Yes    Blister of second toe of left foot [S90.425A] 07/12/2018 Yes    Deep tissue injury [T14.8XXA] 07/12/2018 Yes    Cardiac arrest [I46.9] 07/11/2018 Yes    HAP (hospital-acquired pneumonia) [J18.9] 07/11/2018 Yes    Acute respiratory failure with hypoxia [J96.01] 07/11/2018 Yes    Type 2 diabetes mellitus with hyperglycemia, without long-term current use of insulin [E11.65] 07/11/2018 Yes    Hypertension [I10] 08/19/2016 Yes     Chronic    Thrombocytopenia [D69.6] 03/14/2016 Not Applicable     Chronic    ESRD on hemodialysis [N18.6, Z99.2]  Not Applicable     Chronic      Problems Resolved During this Admission:    Diagnosis Date Noted Date Resolved POA       Discharged Condition: stable      Patient Instructions:   No discharge procedures on file.    Significant Diagnostic Studies: Labs:   BMP:   Recent Labs  Lab 07/17/18  0333 07/18/18  0321   GLU 67* 171*    138   K 5.3* 4.3    98   CO2 19*  21*   * 113*   CREATININE 5.7* 6.2*   CALCIUM 9.8 9.7   MG 2.8* 3.2*   , CMP   Recent Labs  Lab 07/17/18  0333 07/18/18  0321    138   K 5.3* 4.3    98   CO2 19* 21*   GLU 67* 171*   * 113*   CREATININE 5.7* 6.2*   CALCIUM 9.8 9.7   PROT 6.8 6.6   ALBUMIN 2.1* 2.0*   BILITOT 0.8 0.6   ALKPHOS 149* 149*   AST 23 13   ALT 12 11   ANIONGAP 20* 19*   ESTGFRAFRICA 8* 7*   EGFRNONAA 7* 6*    and CBC   Recent Labs  Lab 07/17/18  0333 07/18/18  0321   WBC 13.65* 11.53   HGB 10.0* 10.5*   HCT 33.0* 34.3*   PLT 94* 138*       Pending Diagnostic Studies:     None         Medications:      Indwelling Lines/Drains at time of discharge:   Lines/Drains/Airways     Drain                 Hemodialysis AV Fistula Left upper arm -- days         NG/OG Tube 07/11/18 1811 orogastric 18 Fr. Left mouth 6 days          Pressure Ulcer                 Pressure Injury 07/12/18 Left Heel Deep tissue injury 6 days         Pressure Injury 07/12/18 Right Heel Deep tissue injury 6 days         Pressure Injury 07/16/18 1900 Right posterior Calf Unstageable 1 day         Pressure Injury 07/17/18 0800 Left posterior Calf Unstageable 1 day         Pressure Injury 07/17/18 0800 Right Buttocks Deep tissue injury 1 day                Time spent on the discharge of patient: 40 minutes  Patient was seen and examined on the date of discharge and determined to be suitable for discharge.         Yamel Mc MD  Department of Hospital Medicine  [facility]

## 2018-07-18 NOTE — SUBJECTIVE & OBJECTIVE
Review of Systems   Unable to perform ROS: Intubated       Objective:     Vital Signs (Most Recent):  Temp: 97.8 °F (36.6 °C) (07/18/18 0315)  Pulse: 85 (07/18/18 1148)  Resp: 19 (07/18/18 1148)  BP: (!) 152/64 (07/18/18 0701)  SpO2: 100 % (07/18/18 1148) Vital Signs (24h Range):  Temp:  [97.8 °F (36.6 °C)-98.8 °F (37.1 °C)] 97.8 °F (36.6 °C)  Pulse:  [72-88] 85  Resp:  [9-30] 19  SpO2:  [99 %-100 %] 100 %  BP: (134-203)/() 152/64     Weight: 52 kg (114 lb 10.2 oz)  Body mass index is 23.15 kg/m².      Intake/Output Summary (Last 24 hours) at 07/18/18 1258  Last data filed at 07/18/18 0600   Gross per 24 hour   Intake             1350 ml   Output             2500 ml   Net            -1150 ml       Physical Exam   Constitutional: She appears well-developed. She appears cachectic.  Non-toxic appearance. She has a sickly appearance. She appears ill. No distress. She is intubated.   HENT:   Head: Normocephalic and atraumatic.   Mouth/Throat: Oropharynx is clear and moist and mucous membranes are normal.   Eyes: EOM and lids are normal.   Pupils sluggish   Neck: Trachea normal. Neck supple. Carotid bruit is not present.   Cardiovascular: Normal rate, regular rhythm and normal heart sounds.    Pulses:       Radial pulses are 2+ on the right side, and 2+ on the left side.        Dorsalis pedis pulses are 1+ on the right side, and 1+ on the left side.   Pulmonary/Chest: Effort normal. No accessory muscle usage. She is intubated. No respiratory distress. She has decreased breath sounds. She has rhonchi (improved post suction of OET of thick yellow secretions).   Abdominal: Soft. Normal appearance and bowel sounds are normal. She exhibits no distension. There is no tenderness.   Musculoskeletal:        Right foot: There is no deformity.        Left foot: There is no deformity.   Mild edema   Feet:   Right Foot:   Skin Integrity: Positive for skin breakdown.   Left Foot:   Skin Integrity: Positive for skin breakdown.    Lymphadenopathy:     She has no cervical adenopathy.   Neurological: She is unresponsive.   Weak corneal reflex and pupils sluggish.  Spont opens eyes to stimuli but inconsistent and weak withdrawal of LEs to pain but no withdrawal of UEs to pain. No spont or purposeful movements off any sedation.    Skin: Skin is warm and dry. Capillary refill takes less than 2 seconds. No rash noted. No cyanosis.            Vents:  Vent Mode: A/C (07/18/18 1148)  Ventilator Initiated: Yes (07/11/18 1454)  Set Rate: 16 bmp (07/18/18 1148)  Vt Set: 350 mL (07/18/18 1148)  Pressure Support: 0 cmH20 (07/18/18 1148)  PEEP/CPAP: 8 cmH20 (07/18/18 1148)  Oxygen Concentration (%): 35 (07/18/18 1148)  Peak Airway Pressure: 24 cmH2O (07/18/18 1148)  Plateau Pressure: 0 cmH20 (07/18/18 1148)  Total Ve: 7.22 mL (07/18/18 1148)  F/VT Ratio<105 (RSBI): (!) 50.13 (07/18/18 1148)    Lines/Drains/Airways     Drain                 Hemodialysis AV Fistula Left upper arm -- days         NG/OG Tube 07/11/18 1811 orogastric 18 Fr. Left mouth 6 days          Airway                 Airway - Non-Surgical 07/11/18 1426 Endotracheal Tube 6 days          Pressure Ulcer                 Pressure Injury 07/12/18 Left Heel Deep tissue injury 6 days         Pressure Injury 07/12/18 Right Heel Deep tissue injury 6 days         Pressure Injury 07/16/18 1900 Right posterior Calf Unstageable 1 day         Pressure Injury 07/17/18 0800 Left posterior Calf Unstageable 1 day         Pressure Injury 07/17/18 0800 Right Buttocks Deep tissue injury 1 day          Peripheral Intravenous Line                 Peripheral IV - Single Lumen 07/11/18 1240 Right Forearm 7 days         Peripheral IV - Single Lumen 07/11/18 1438 Right  6 days                Significant Labs:    CBC/Anemia Profile:    Recent Labs  Lab 07/17/18  0333 07/18/18  0321   WBC 13.65* 11.53   HGB 10.0* 10.5*   HCT 33.0* 34.3*   PLT 94* 138*   MCV 78* 79*   RDW 19.7* 20.2*        Chemistries:    Recent  Labs  Lab 07/17/18  0333 07/18/18  0321    138   K 5.3* 4.3    98   CO2 19* 21*   * 113*   CREATININE 5.7* 6.2*   CALCIUM 9.8 9.7   ALBUMIN 2.1* 2.0*   PROT 6.8 6.6   BILITOT 0.8 0.6   ALKPHOS 149* 149*   ALT 12 11   AST 23 13   MG 2.8* 3.2*   PHOS 4.7* 5.3*       All pertinent labs within the past 24 hours have been reviewed.    Significant Imaging:  CXR: I have reviewed all pertinent results/findings within the past 24 hours and my personal findings are:  no change

## 2018-07-18 NOTE — PROGRESS NOTES
[facility] -   Critical Care Medicine  Progress Note    Patient Name: Talia Mcfarland  MRN: [id number]  Admission Date: 7/11/2018  Hospital Length of Stay: 7 days  Code Status: DNR  Attending Provider: Yamel Mc MD  Primary Care Provider: Tank Castro MD   Principal Problem: Acute encephalopathy    Subjective:     HPI:  68-year-old female patient brought by EMS to the emergency department from dialysis after she complained of malaise and had altered mental status after dialysis today.    Hospital/ICU Course:  While in the ER the patient had pulseless electrical activity and she had CPR performed for about 6 min.  Patient was seen and examined in the emergency room.  She was intubated.  Chest x-ray post intubation showed low ET tube ET tube was pulled out.  Repeat chest x-ray showed good position of ET tube.  7/12 Seen and examined. Intubated, no sedation, no pressors. Tmax 100.8. Bld cx GNR. CXR and ABG reviewed.   7/13 patient seen and examined, intubated, not sedated no pressors.  Chest x-ray and ABG reviewed no changes in mental status.  Breathing over the vent.  Not moving extremities unresponsive.  7/14 seen and examined, intubated, no sedation.  Chest x-ray and ABG reviewed.  No overall change in patient's status.  Had dialysis today.  7/15 - No neuro improvement still resting on full vent support with TF.  Family at bedside.   7/16 - Still no neuro change.  Resting on vent and tolerating TF.   7/17 - No neuro change.  Resting on vent and tolerating TF.  Discussed care with spouse at bedside yesterday and he states patient would not want Trach/PEG and long term support.  He plans to make final decision regarding Trach/PEG vs comfort care tomorrow  7/18 - spouse and 3 children at bedside and request comfort care with palliative vent withdrawal.  They feel she would not want long term support.  She has had no neuro improvement.  Will honor family wishes.      Review of Systems   Unable to  perform ROS: Intubated       Objective:     Vital Signs (Most Recent):  Temp: 97.8 °F (36.6 °C) (07/18/18 0315)  Pulse: 85 (07/18/18 1148)  Resp: 19 (07/18/18 1148)  BP: (!) 152/64 (07/18/18 0701)  SpO2: 100 % (07/18/18 1148) Vital Signs (24h Range):  Temp:  [97.8 °F (36.6 °C)-98.8 °F (37.1 °C)] 97.8 °F (36.6 °C)  Pulse:  [72-88] 85  Resp:  [9-30] 19  SpO2:  [99 %-100 %] 100 %  BP: (134-203)/() 152/64     Weight: 52 kg (114 lb 10.2 oz)  Body mass index is 23.15 kg/m².      Intake/Output Summary (Last 24 hours) at 07/18/18 1258  Last data filed at 07/18/18 0600   Gross per 24 hour   Intake             1350 ml   Output             2500 ml   Net            -1150 ml       Physical Exam   Constitutional: She appears well-developed. She appears cachectic.  Non-toxic appearance. She has a sickly appearance. She appears ill. No distress. She is intubated.   HENT:   Head: Normocephalic and atraumatic.   Mouth/Throat: Oropharynx is clear and moist and mucous membranes are normal.   Eyes: EOM and lids are normal.   Pupils sluggish   Neck: Trachea normal. Neck supple. Carotid bruit is not present.   Cardiovascular: Normal rate, regular rhythm and normal heart sounds.    Pulses:       Radial pulses are 2+ on the right side, and 2+ on the left side.        Dorsalis pedis pulses are 1+ on the right side, and 1+ on the left side.   Pulmonary/Chest: Effort normal. No accessory muscle usage. She is intubated. No respiratory distress. She has decreased breath sounds. She has rhonchi (improved post suction of OET of thick yellow secretions).   Abdominal: Soft. Normal appearance and bowel sounds are normal. She exhibits no distension. There is no tenderness.   Musculoskeletal:        Right foot: There is no deformity.        Left foot: There is no deformity.   Mild edema   Feet:   Right Foot:   Skin Integrity: Positive for skin breakdown.   Left Foot:   Skin Integrity: Positive for skin breakdown.   Lymphadenopathy:     She has no  cervical adenopathy.   Neurological: She is unresponsive.   Weak corneal reflex and pupils sluggish.  Spont opens eyes to stimuli but inconsistent and weak withdrawal of LEs to pain but no withdrawal of UEs to pain. No spont or purposeful movements off any sedation.    Skin: Skin is warm and dry. Capillary refill takes less than 2 seconds. No rash noted. No cyanosis.            Vents:  Vent Mode: A/C (07/18/18 1148)  Ventilator Initiated: Yes (07/11/18 1454)  Set Rate: 16 bmp (07/18/18 1148)  Vt Set: 350 mL (07/18/18 1148)  Pressure Support: 0 cmH20 (07/18/18 1148)  PEEP/CPAP: 8 cmH20 (07/18/18 1148)  Oxygen Concentration (%): 35 (07/18/18 1148)  Peak Airway Pressure: 24 cmH2O (07/18/18 1148)  Plateau Pressure: 0 cmH20 (07/18/18 1148)  Total Ve: 7.22 mL (07/18/18 1148)  F/VT Ratio<105 (RSBI): (!) 50.13 (07/18/18 1148)    Lines/Drains/Airways     Drain                 Hemodialysis AV Fistula Left upper arm -- days         NG/OG Tube 07/11/18 1811 orogastric 18 Fr. Left mouth 6 days          Airway                 Airway - Non-Surgical 07/11/18 1426 Endotracheal Tube 6 days          Pressure Ulcer                 Pressure Injury 07/12/18 Left Heel Deep tissue injury 6 days         Pressure Injury 07/12/18 Right Heel Deep tissue injury 6 days         Pressure Injury 07/16/18 1900 Right posterior Calf Unstageable 1 day         Pressure Injury 07/17/18 0800 Left posterior Calf Unstageable 1 day         Pressure Injury 07/17/18 0800 Right Buttocks Deep tissue injury 1 day          Peripheral Intravenous Line                 Peripheral IV - Single Lumen 07/11/18 1240 Right Forearm 7 days         Peripheral IV - Single Lumen 07/11/18 1438 Right  6 days                Significant Labs:    CBC/Anemia Profile:    Recent Labs  Lab 07/17/18  0333 07/18/18  0321   WBC 13.65* 11.53   HGB 10.0* 10.5*   HCT 33.0* 34.3*   PLT 94* 138*   MCV 78* 79*   RDW 19.7* 20.2*        Chemistries:    Recent Labs  Lab 07/17/18  033  07/18/18  0321    138   K 5.3* 4.3    98   CO2 19* 21*   * 113*   CREATININE 5.7* 6.2*   CALCIUM 9.8 9.7   ALBUMIN 2.1* 2.0*   PROT 6.8 6.6   BILITOT 0.8 0.6   ALKPHOS 149* 149*   ALT 12 11   AST 23 13   MG 2.8* 3.2*   PHOS 4.7* 5.3*       All pertinent labs within the past 24 hours have been reviewed.    Significant Imaging:  CXR: I have reviewed all pertinent results/findings within the past 24 hours and my personal findings are:  no change      Assessment/Plan:     Problem   Acute Encephalopathy   Septicemia Due to Acinetobacter Species   Acute Respiratory Failure With Hypoxia   Hap (Hospital-Acquired Pneumonia)   E. Coli Urinary Tract Infection   Deep Tissue Injury   Hypertension   Esrd On Hemodialysis   Hx of Noncompliance With Medical Treatment, Presenting Hazards to Health   Thrombocytopenia   Cardiac Arrest   Type 2 Diabetes Mellitus With Hyperglycemia, Without Long-Term Current Use of Insulin     Palliative vent withdrawal and comfort care per family wishes after long conference with spouse and 3 children and no neuro improvement or ability to wean from mech ventilation.       Critical Care Time: 46 minutes  Critical secondary to Patient has a condition that poses threat to life and bodily function: intubation on mech ventilation.  Will sign off.      Critical care was time spent personally by me on the following activities: development of treatment plan with patient or surrogate and bedside caregivers, discussions with consultants, evaluation of patient's response to treatment, examination of patient, ordering and performing treatments and interventions, ordering and review of laboratory studies, ordering and review of radiographic studies, pulse oximetry, re-evaluation of patient's condition. This critical care time did not overlap with that of any other provider or involve time for any procedures.     Adolfo Dow NP  Critical Care Medicine  [facility] -

## 2018-07-18 NOTE — PLAN OF CARE
Problem: Patient Care Overview  Goal: Plan of Care Review  Outcome: Ongoing (interventions implemented as appropriate)  Pt had HD today; 2L removed; no new changes.

## 2018-07-18 NOTE — PLAN OF CARE
CAROL ANN spoke with the patient dtr / family regarding transition of care . Since she is stable and care has been w/drawned she is now comfort care. CM explained hospice. CM gave family a listing of inpatient facilities. Per family request, Inpatient hospice at the [facility]. Preference form signed, copy given to the dtr and original to the blue folder. CAROL ANN contacted [facility]  to notify her  Of consents needing to be sign for transfer to inpatient today

## 2018-07-18 NOTE — ASSESSMENT & PLAN NOTE
-acute hyper capneic and hypoxic respiratory failure   - in setting of multilobar pneumonia   - VENT management per critical care

## 2018-07-18 NOTE — ASSESSMENT & PLAN NOTE
-dialysis patient with multilobar pneumonia  - continue with broad spectrum antibotics   -Acinetobacter in respiratory culture and blood culture acinetobacter continue with ampicillin-sulbactim and ciprofloxcin

## 2018-07-18 NOTE — ASSESSMENT & PLAN NOTE
No improvement in mentation after treating infection most likely hypoxic brain injury from cardiac arrest

## 2018-07-18 NOTE — ASSESSMENT & PLAN NOTE
-initial rhythm as per ed notes PEA  - two rounds of cpr with rosc   - echo with right heart strain and elevated PA and increase central vein pressure pe was rule out cta negative   -No improvement in mental status after cardiac arrest -hypoxic brain injury .

## 2018-07-18 NOTE — ASSESSMENT & PLAN NOTE
Patient on MWF schedule as outpatient. Very non-compliant with outpatient HD treatment. S/p HD yesterday. Will evaluate for HD need on daily basis.     Access: Left arm AVF.

## 2018-07-18 NOTE — PROGRESS NOTES
[facility] -   Nephrology  Progress Note    Patient Name: Talia Mcfarland  MRN: [id number]  Admission Date: 7/11/2018  Hospital Length of Stay: 7 days  Attending Provider: Yamel Mc MD   Primary Care Physician: Tank Castro MD  Principal Problem:Acute encephalopathy    Subjective:     HPI: Talia Mcfarland is a 78 y old AA woman with h/o HTN, PAD , ESRD on HD ( MWF , [facility] ) , DM-2 , highly noncompliant with outpatient dialysis treatments, patient misses at least 1 treatment every week for outpatient dialysis team, patient went to hemodialysis treatment today, she had some fever and nausea at the dialysis unit, and a set of blood cultures were drawn and patient received a g of vancomycin and was sent to the emergency room, while she was in the ER here patient developed cardiac arrest requiring CPR and was intubated.  Patient started on broad-spectrum antibiotics.  She also has a wound on her left ft, significant peripheral edema noted likely due to nonadherence with dialysis.  CT chest done on admission showed significant left-sided pneumonia.    Interval History:    7/17/18: Patient remains intubated and unresponsive. Patient is DNR.     7/18/18: No changes overnight as per nursing staff. Family meeting is scheduled for today.         Review of patient's allergies indicates:   Allergen Reactions    Tylenol [acetaminophen] Other (See Comments)     Headache     Current Facility-Administered Medications   Medication Frequency    0.9%  NaCl infusion PRN    0.9%  NaCl infusion Once    acetylcysteine 100 mg/ml (10%) solution 4 mL BID    albumin human 25% bottle 12.5 g PRN    albuterol-ipratropium 2.5 mg-0.5 mg/3 mL nebulizer solution 3 mL Q6H    ampicillin-sulbactam 3 g in sodium chloride 0.9 % 100 mL IVPB (ready to mix system) Q24H    bisacodyl suppository 10 mg Daily PRN    chlorhexidine 0.12 % solution 15 mL BID    ciprofloxacin (CIPRO)400mg/200ml D5W IVPB 400 mg Q24H    dextrose  50% injection 12.5 g PRN    famotidine (PF) injection 20 mg Daily    glucagon (human recombinant) injection 1 mg PRN    heparin (porcine) injection 5,000 Units Q8H    insulin aspart U-100 pen 0-5 Units Q6H PRN    levETIRAcetam in NaCl (iso-os) IVPB 500 mg Q12H    levETIRAcetam in NaCl (iso-os) IVPB 500 mg Every Mon, Wed, Fri    metoprolol tartrate (LOPRESSOR) tablet 50 mg BID    polyethylene glycol packet 17 g Daily       Objective:     Vital Signs (Most Recent):  Temp: 97.8 °F (36.6 °C) (07/18/18 0315)  Pulse: 86 (07/18/18 0848)  Resp: 17 (07/18/18 0848)  BP: (!) 152/64 (07/18/18 0701)  SpO2: 100 % (07/18/18 0848)  O2 Device (Oxygen Therapy): ventilator (07/18/18 0848) Vital Signs (24h Range):  Temp:  [97.8 °F (36.6 °C)-98.8 °F (37.1 °C)] 97.8 °F (36.6 °C)  Pulse:  [72-89] 86  Resp:  [9-30] 17  SpO2:  [99 %-100 %] 100 %  BP: (134-203)/() 152/64     Weight: 52 kg (114 lb 10.2 oz) (07/16/18 0500)  Body mass index is 23.15 kg/m².  Body surface area is 1.47 meters squared.    I/O last 3 completed shifts:  In: 1700 [Other:500; NG/GT:600; IV Piggyback:600]  Out: 2500 [Other:2500]    Physical Exam   Constitutional: She appears well-developed. She is intubated.   HENT:   Head: Normocephalic.   ET and OG tubes in place.   Neck: No thyromegaly present.   Cardiovascular: Normal rate and regular rhythm.  Exam reveals no friction rub.    Pulmonary/Chest: Breath sounds normal. She is intubated. She has no wheezes. She exhibits no tenderness.   Abdominal: Soft. Bowel sounds are normal. She exhibits no distension. There is no tenderness.   Musculoskeletal: She exhibits no edema.   Lymphadenopathy:     She has no cervical adenopathy.   Neurological: She is unresponsive.   Skin: Skin is warm and dry. No rash noted.       Significant Labs:  Lab Results   Component Value Date    CREATININE 6.2 (H) 07/18/2018     (H) 07/18/2018     07/18/2018    K 4.3 07/18/2018    CL 98 07/18/2018    CO2 21 (L) 07/18/2018      Lab Results   Component Value Date    CALCIUM 9.7 07/18/2018    PHOS 5.3 (H) 07/18/2018     Lab Results   Component Value Date    ALBUMIN 2.0 (L) 07/18/2018     Lab Results   Component Value Date    WBC 11.53 07/18/2018    HGB 10.5 (L) 07/18/2018    HCT 34.3 (L) 07/18/2018    MCV 79 (L) 07/18/2018     (L) 07/18/2018       Recent Labs  Lab 07/18/18  0321   MG 3.2*         Significant Imaging:  Imaging Results          CTA Chest Non Coronary (Final result)  Result time 07/11/18 17:46:11    Final result by Adolfo Douglas Jr., MD (07/11/18 17:46:11)                 Impression:      No pulmonary embolus.    Multilobar pneumonia.    Enlargement of the pulmonary arteries consistent with pulmonary artery hypertension.    Marked cardiomegaly.    All CT scan at this facility use dose modulation, iterative reconstruction, and/or weight base dosing when appropriate to reduce radiation dose to as low as reasonably achievable.      Electronically signed by: Adolfo Douglas Jr., MD  Date:    07/11/2018  Time:    17:46             Narrative:    EXAMINATION:  CTA CHEST NON CORONARY    CLINICAL HISTORY:  Chest pain, acute, PE suspected, high pretest prob;    TECHNIQUE:  Axial CTA images performed through the chest after the administration of 100 cc intravenous contrast. Maximum intensity projections were performed and interpreted.    FINDINGS:  There is no evidence of pulmonary embolus. Pulmonary arteries are enlarged with the main pulmonary artery measuring 4.5 cm.  The heart is markedly enlarged with dense atherosclerotic calcifications of the coronary arteries.  The great vessels are patent.  Endotracheal tube in satisfactory position.  No mediastinal adenopathy.  Probable hemodialysis graft partially visualized the left upper extremity.    Extensive areas of consolidation involving the left upper and lower lobes.  There is also consolidation in the medial right lower lobe and patchy areas of ground-glass opacification  within the right middle lobe.  Visualized abdominal organs are unremarkable.  Osseous structures are intact.  No effusion or pneumothorax.                               CT Head Without Contrast (Final result)  Result time 07/11/18 17:19:21    Final result by Adolfo Douglas Jr., MD (07/11/18 17:19:21)                 Impression:      1. No acute intracranial findings.  Stable exam.  All CT scans at this facility are performed  using dose modulation techniques as appropriate to performed exam including the following:  automated exposure control; adjustment of mA and/or kV according to the patients size (this includes techniques or standardized protocols for targeted exams where dose is matched to indication/reason for exam: i.e. extremities or head);  iterative reconstruction technique.      Electronically signed by: Adolfo Douglas Jr., MD  Date:    07/11/2018  Time:    17:19             Narrative:    EXAMINATION:  CT HEAD WITHOUT CONTRAST    CLINICAL HISTORY:  Decreased alertness;    TECHNIQUE:  CT scan was obtained of the head without administration of contrast.    COMPARISON:  07/11/2018.    FINDINGS:  Stable atrophy with periventricular white matter changes.  No extra-axial acute fluid collection.Ventricles and basal cisterns are normal.  No hemorrhage, mass effect or midline shift.  No other cerebral or cerebellar parenchymal abnormality.  Paranasal sinuses are clear.  Mastoid air cells are clear.  Calvarium is intact.                               X-Ray Chest AP Portable (Final result)  Result time 07/11/18 15:17:22    Final result by Vamshi Lynne MD (07/11/18 15:17:22)                 Impression:      As above.      Electronically signed by: Vamshi Lynne MD  Date:    07/11/2018  Time:    15:17             Narrative:    EXAMINATION:  XR CHEST AP PORTABLE    CLINICAL HISTORY:  post intubation; respiratory distress    TECHNIQUE:  AP view of the chest was performed.    COMPARISON:  Earlier  today.    FINDINGS:  Endotracheal tube in satisfactory position.  Worsening left upper lobe pneumonia.                               X-Ray Foot Complete Left (Final result)  Result time 07/11/18 13:18:19    Final result by Vamshi Lynne MD (07/11/18 13:18:19)                 Impression:      Charcot arthropathy.  Superimposed osteomyelitis is not excluded.  Consider MRI correlation.      Electronically signed by: Vamshi Lynne MD  Date:    07/11/2018  Time:    13:18             Narrative:    EXAMINATION:  XR FOOT COMPLETE 3 VIEW LEFT    CLINICAL HISTORY:  Osteomyelitis, unspecified.   Pain.    TECHNIQUE:  3 views.    COMPARISON:  None    FINDINGS:  Severe osteopenia.  Extensive vascular calcifications.  Plantar subluxation of the midfoot.  Sclerosis and partial collapse of the middle cuneiform.  Extensive erosive changes throughout the midfoot.  Diffuse soft tissue swelling.  Prominent plantar spur.                               X-Ray Chest AP Portable (Final result)  Result time 07/11/18 13:15:44    Final result by Vamshi Lynne MD (07/11/18 13:15:44)                 Impression:      Left upper lobe pneumonia.  Recommend close follow-up after treatment.      Electronically signed by: Vamshi Lynne MD  Date:    07/11/2018  Time:    13:15             Narrative:    EXAMINATION:  XR CHEST AP PORTABLE    CLINICAL HISTORY:  cough;    TECHNIQUE:  AP view of the chest was performed.    COMPARISON:  October 2, 2017    FINDINGS:  Left upper lobe consolidation.    Stable cardiomegaly.  Aortic atherosclerosis.  No acute osseous findings demonstrated.                               US Lower Extremity Veins Bilateral (Final result)  Result time 07/11/18 13:15:04    Final result by Vamshi Lynne MD (07/11/18 13:15:04)                 Impression:      Negative for bilateral lower extremity DVT.  Limited visualization of the left calf veins.      Electronically signed by: Vamshi Lynne MD  Date:    07/11/2018  Time:    13:15              Narrative:    EXAMINATION:  US LOWER EXTREMITY VEINS BILATERAL    CLINICAL HISTORY:  Edema, unspecified    FINDINGS:  Grayscale and color Doppler sonography was formed through the bilateral lower extremities.    The visualized bilateral lower extremity veins are widely patent with normal augmentation, and compressibility and respiratory variability.  The left leg and calf veins were not visualized secondary to use marked soft tissue edema and skin thickening.  No evidence of fluid collection.                               CT Head Without Contrast (Final result)  Result time 07/11/18 12:18:25    Final result by Benedict Henderson MD (07/11/18 12:18:25)                 Impression:      Chronic microvascular ischemic changes.      Electronically signed by: Benedict Henderson MD  Date:    07/11/2018  Time:    12:18             Narrative:    EXAMINATION:  CT HEAD WITHOUT CONTRAST    CLINICAL HISTORY:  ams;    TECHNIQUE:  Low dose axial CT images obtained throughout the head without intravenous contrast. Sagittal and coronal reconstructions were performed.  All CT scans at this facility use dose modulation, iterative reconstruction and/or weight based dosing when appropriate to reduce radiation dose to as low as reasonably achievable.    COMPARISON:  03/14/2016    FINDINGS:  Intracranial compartment:    The brain parenchyma demonstrates areas of decreased attenuation with mild to moderate periventricular white matter consistent with chronic microvascular ischemic changes..  No parenchymal mass, hemorrhage, edema or major vascular distribution infarct.  Vascular calcifications are noted.    Mild prominence of the sulci and ventricles are consistent with age-related involutional changes.    No extra-axial blood or fluid collections.    Skull/extracranial contents (limited evaluation): No fracture. Mastoid air cells and paranasal sinuses are essentially clear.                                  Assessment/Plan:     Hyperkalemia     Has resolved following HD.         Septicemia due to Acinetobacter species    As per ID. Continue antibiotics.         HAP (hospital-acquired pneumonia)    Continue antibiotics.         Cardiac arrest    As per ICU/Cardiology.        Thrombocytopenia    Has largely resolved.         ESRD on hemodialysis    Patient on MWF schedule as outpatient. Very non-compliant with outpatient HD treatment. S/p HD yesterday. Will evaluate for HD need on daily basis.     Access: Left arm AVF.             Total ICU time: 35 minutes. More than 50% was spent on chart review and discussing case with ICU team.     Thank you for your consult. I will follow-up with patient. Please contact us if you have any additional questions.    Braden Fuentes MD  Nephrology  [facility] [place]

## 2018-07-18 NOTE — PLAN OF CARE
Problem: Patient Care Overview  Goal: Plan of Care Review  Outcome: Ongoing (interventions implemented as appropriate)  Pt made comfort care; will be transferred to in-pt hospice facility when bed available.

## 2018-07-18 NOTE — SUBJECTIVE & OBJECTIVE
Interval History:    7/17/18: Patient remains intubated and unresponsive. Patient is DNR.     7/18/18: No changes overnight as per nursing staff. Family meeting is scheduled for today.         Review of patient's allergies indicates:   Allergen Reactions    Tylenol [acetaminophen] Other (See Comments)     Headache     Current Facility-Administered Medications   Medication Frequency    0.9%  NaCl infusion PRN    0.9%  NaCl infusion Once    acetylcysteine 100 mg/ml (10%) solution 4 mL BID    albumin human 25% bottle 12.5 g PRN    albuterol-ipratropium 2.5 mg-0.5 mg/3 mL nebulizer solution 3 mL Q6H    ampicillin-sulbactam 3 g in sodium chloride 0.9 % 100 mL IVPB (ready to mix system) Q24H    bisacodyl suppository 10 mg Daily PRN    chlorhexidine 0.12 % solution 15 mL BID    ciprofloxacin (CIPRO)400mg/200ml D5W IVPB 400 mg Q24H    dextrose 50% injection 12.5 g PRN    famotidine (PF) injection 20 mg Daily    glucagon (human recombinant) injection 1 mg PRN    heparin (porcine) injection 5,000 Units Q8H    insulin aspart U-100 pen 0-5 Units Q6H PRN    levETIRAcetam in NaCl (iso-os) IVPB 500 mg Q12H    levETIRAcetam in NaCl (iso-os) IVPB 500 mg Every Mon, Wed, Fri    metoprolol tartrate (LOPRESSOR) tablet 50 mg BID    polyethylene glycol packet 17 g Daily       Objective:     Vital Signs (Most Recent):  Temp: 97.8 °F (36.6 °C) (07/18/18 0315)  Pulse: 86 (07/18/18 0848)  Resp: 17 (07/18/18 0848)  BP: (!) 152/64 (07/18/18 0701)  SpO2: 100 % (07/18/18 0848)  O2 Device (Oxygen Therapy): ventilator (07/18/18 0848) Vital Signs (24h Range):  Temp:  [97.8 °F (36.6 °C)-98.8 °F (37.1 °C)] 97.8 °F (36.6 °C)  Pulse:  [72-89] 86  Resp:  [9-30] 17  SpO2:  [99 %-100 %] 100 %  BP: (134-203)/() 152/64     Weight: 52 kg (114 lb 10.2 oz) (07/16/18 0500)  Body mass index is 23.15 kg/m².  Body surface area is 1.47 meters squared.    I/O last 3 completed shifts:  In: 1700 [Other:500; NG/GT:600; IV Piggyback:600]  Out:  2500 [Other:2500]    Physical Exam   Constitutional: She appears well-developed. She is intubated.   HENT:   Head: Normocephalic.   ET and OG tubes in place.   Neck: No thyromegaly present.   Cardiovascular: Normal rate and regular rhythm.  Exam reveals no friction rub.    Pulmonary/Chest: Breath sounds normal. She is intubated. She has no wheezes. She exhibits no tenderness.   Abdominal: Soft. Bowel sounds are normal. She exhibits no distension. There is no tenderness.   Musculoskeletal: She exhibits no edema.   Lymphadenopathy:     She has no cervical adenopathy.   Neurological: She is unresponsive.   Skin: Skin is warm and dry. No rash noted.       Significant Labs:  Lab Results   Component Value Date    CREATININE 6.2 (H) 07/18/2018     (H) 07/18/2018     07/18/2018    K 4.3 07/18/2018    CL 98 07/18/2018    CO2 21 (L) 07/18/2018     Lab Results   Component Value Date    CALCIUM 9.7 07/18/2018    PHOS 5.3 (H) 07/18/2018     Lab Results   Component Value Date    ALBUMIN 2.0 (L) 07/18/2018     Lab Results   Component Value Date    WBC 11.53 07/18/2018    HGB 10.5 (L) 07/18/2018    HCT 34.3 (L) 07/18/2018    MCV 79 (L) 07/18/2018     (L) 07/18/2018       Recent Labs  Lab 07/18/18  0321   MG 3.2*         Significant Imaging:  Imaging Results          CTA Chest Non Coronary (Final result)  Result time 07/11/18 17:46:11    Final result by Adolfo Douglas Jr., MD (07/11/18 17:46:11)                 Impression:      No pulmonary embolus.    Multilobar pneumonia.    Enlargement of the pulmonary arteries consistent with pulmonary artery hypertension.    Marked cardiomegaly.    All CT scan at this facility use dose modulation, iterative reconstruction, and/or weight base dosing when appropriate to reduce radiation dose to as low as reasonably achievable.      Electronically signed by: Adolfo Douglas Jr., MD  Date:    07/11/2018  Time:    17:46             Narrative:    EXAMINATION:  CTA CHEST NON  CORONARY    CLINICAL HISTORY:  Chest pain, acute, PE suspected, high pretest prob;    TECHNIQUE:  Axial CTA images performed through the chest after the administration of 100 cc intravenous contrast. Maximum intensity projections were performed and interpreted.    FINDINGS:  There is no evidence of pulmonary embolus. Pulmonary arteries are enlarged with the main pulmonary artery measuring 4.5 cm.  The heart is markedly enlarged with dense atherosclerotic calcifications of the coronary arteries.  The great vessels are patent.  Endotracheal tube in satisfactory position.  No mediastinal adenopathy.  Probable hemodialysis graft partially visualized the left upper extremity.    Extensive areas of consolidation involving the left upper and lower lobes.  There is also consolidation in the medial right lower lobe and patchy areas of ground-glass opacification within the right middle lobe.  Visualized abdominal organs are unremarkable.  Osseous structures are intact.  No effusion or pneumothorax.                               CT Head Without Contrast (Final result)  Result time 07/11/18 17:19:21    Final result by Adolfo Douglas Jr., MD (07/11/18 17:19:21)                 Impression:      1. No acute intracranial findings.  Stable exam.  All CT scans at this facility are performed  using dose modulation techniques as appropriate to performed exam including the following:  automated exposure control; adjustment of mA and/or kV according to the patients size (this includes techniques or standardized protocols for targeted exams where dose is matched to indication/reason for exam: i.e. extremities or head);  iterative reconstruction technique.      Electronically signed by: Adolfo Douglas Jr., MD  Date:    07/11/2018  Time:    17:19             Narrative:    EXAMINATION:  CT HEAD WITHOUT CONTRAST    CLINICAL HISTORY:  Decreased alertness;    TECHNIQUE:  CT scan was obtained of the head without administration of  contrast.    COMPARISON:  07/11/2018.    FINDINGS:  Stable atrophy with periventricular white matter changes.  No extra-axial acute fluid collection.Ventricles and basal cisterns are normal.  No hemorrhage, mass effect or midline shift.  No other cerebral or cerebellar parenchymal abnormality.  Paranasal sinuses are clear.  Mastoid air cells are clear.  Calvarium is intact.                               X-Ray Chest AP Portable (Final result)  Result time 07/11/18 15:17:22    Final result by Vamshi Lynne MD (07/11/18 15:17:22)                 Impression:      As above.      Electronically signed by: Vamshi Lynne MD  Date:    07/11/2018  Time:    15:17             Narrative:    EXAMINATION:  XR CHEST AP PORTABLE    CLINICAL HISTORY:  post intubation; respiratory distress    TECHNIQUE:  AP view of the chest was performed.    COMPARISON:  Earlier today.    FINDINGS:  Endotracheal tube in satisfactory position.  Worsening left upper lobe pneumonia.                               X-Ray Foot Complete Left (Final result)  Result time 07/11/18 13:18:19    Final result by Vamshi Lynne MD (07/11/18 13:18:19)                 Impression:      Charcot arthropathy.  Superimposed osteomyelitis is not excluded.  Consider MRI correlation.      Electronically signed by: Vamshi Lynne MD  Date:    07/11/2018  Time:    13:18             Narrative:    EXAMINATION:  XR FOOT COMPLETE 3 VIEW LEFT    CLINICAL HISTORY:  Osteomyelitis, unspecified.   Pain.    TECHNIQUE:  3 views.    COMPARISON:  None    FINDINGS:  Severe osteopenia.  Extensive vascular calcifications.  Plantar subluxation of the midfoot.  Sclerosis and partial collapse of the middle cuneiform.  Extensive erosive changes throughout the midfoot.  Diffuse soft tissue swelling.  Prominent plantar spur.                               X-Ray Chest AP Portable (Final result)  Result time 07/11/18 13:15:44    Final result by Vamshi Lynne MD (07/11/18 13:15:44)                 Impression:       Left upper lobe pneumonia.  Recommend close follow-up after treatment.      Electronically signed by: Vamshi Lynne MD  Date:    07/11/2018  Time:    13:15             Narrative:    EXAMINATION:  XR CHEST AP PORTABLE    CLINICAL HISTORY:  cough;    TECHNIQUE:  AP view of the chest was performed.    COMPARISON:  October 2, 2017    FINDINGS:  Left upper lobe consolidation.    Stable cardiomegaly.  Aortic atherosclerosis.  No acute osseous findings demonstrated.                               US Lower Extremity Veins Bilateral (Final result)  Result time 07/11/18 13:15:04    Final result by Vamshi Lynne MD (07/11/18 13:15:04)                 Impression:      Negative for bilateral lower extremity DVT.  Limited visualization of the left calf veins.      Electronically signed by: Vamshi Lynne MD  Date:    07/11/2018  Time:    13:15             Narrative:    EXAMINATION:  US LOWER EXTREMITY VEINS BILATERAL    CLINICAL HISTORY:  Edema, unspecified    FINDINGS:  Grayscale and color Doppler sonography was formed through the bilateral lower extremities.    The visualized bilateral lower extremity veins are widely patent with normal augmentation, and compressibility and respiratory variability.  The left leg and calf veins were not visualized secondary to use marked soft tissue edema and skin thickening.  No evidence of fluid collection.                               CT Head Without Contrast (Final result)  Result time 07/11/18 12:18:25    Final result by Benedict Henderson MD (07/11/18 12:18:25)                 Impression:      Chronic microvascular ischemic changes.      Electronically signed by: Benedict Henderson MD  Date:    07/11/2018  Time:    12:18             Narrative:    EXAMINATION:  CT HEAD WITHOUT CONTRAST    CLINICAL HISTORY:  ams;    TECHNIQUE:  Low dose axial CT images obtained throughout the head without intravenous contrast. Sagittal and coronal reconstructions were performed.  All CT scans at this facility use  dose modulation, iterative reconstruction and/or weight based dosing when appropriate to reduce radiation dose to as low as reasonably achievable.    COMPARISON:  03/14/2016    FINDINGS:  Intracranial compartment:    The brain parenchyma demonstrates areas of decreased attenuation with mild to moderate periventricular white matter consistent with chronic microvascular ischemic changes..  No parenchymal mass, hemorrhage, edema or major vascular distribution infarct.  Vascular calcifications are noted.    Mild prominence of the sulci and ventricles are consistent with age-related involutional changes.    No extra-axial blood or fluid collections.    Skull/extracranial contents (limited evaluation): No fracture. Mastoid air cells and paranasal sinuses are essentially clear.

## 2018-07-18 NOTE — SUBJECTIVE & OBJECTIVE
Review of Systems   Unable to perform ROS: Intubated     Objective:     Vital Signs (Most Recent):  Temp: 97.8 °F (36.6 °C) (07/18/18 0315)  Pulse: 85 (07/18/18 1148)  Resp: 19 (07/18/18 1148)  BP: (!) 152/64 (07/18/18 0701)  SpO2: 100 % (07/18/18 1148) Vital Signs (24h Range):  Temp:  [97.8 °F (36.6 °C)-98.8 °F (37.1 °C)] 97.8 °F (36.6 °C)  Pulse:  [72-88] 85  Resp:  [9-30] 19  SpO2:  [99 %-100 %] 100 %  BP: (134-203)/() 152/64     Weight: 52 kg (114 lb 10.2 oz)  Body mass index is 23.15 kg/m².    Intake/Output Summary (Last 24 hours) at 07/18/18 1239  Last data filed at 07/18/18 0600   Gross per 24 hour   Intake             1350 ml   Output             2500 ml   Net            -1150 ml      Physical Exam   Constitutional: She appears well-developed. She is intubated.   HENT:   Head: Normocephalic.   ET and OG tubes in place.   Neck: No thyromegaly present.   Cardiovascular: Normal rate and regular rhythm.  Exam reveals no friction rub.    Pulmonary/Chest: Breath sounds normal. She is intubated. She has no wheezes. She exhibits no tenderness.   Abdominal: Soft. Bowel sounds are normal. She exhibits no distension. There is no tenderness.   Musculoskeletal: She exhibits no edema.   Lymphadenopathy:     She has no cervical adenopathy.   Neurological: She is unresponsive.   Skin: Skin is warm and dry. No rash noted.       Significant Labs: All pertinent labs within the past 24 hours have been reviewed.    Significant Imaging: I have reviewed all pertinent imaging results/findings within the past 24 hours.

## 2018-07-19 NOTE — PHYSICIAN QUERY
PT Name: Talia Mcfarland  MR #: [id number]     Physician Query Form - Documentation Clarification      CDS/: Jolene Aiken RN CDI   Contact information: [email or web address]    This form is a permanent document in the medical record.     Query Date: July 19, 2018    By submitting this query, we are merely seeking further clarification of documentation. Please utilize your independent clinical judgment when addressing the question(s) below.    The Medical record reflects the following:    Supporting Clinical Findings Location in Medical Record   Pressure Injury 07/17/18 0800 Right Buttocks Deep tissue injury   Discharge summary 7/18   Filiberto Risk Assessment   Sensory Perception 2-->very limited   Moisture 4-->rarely moist   Activity 1-->bedfast   Mobility 1-->completely immobile   Nutrition 2-->probably inadequate   Friction and Shear 2-->potential problem   Filiberto Score 12     Called to see pt regarding new skin breakdown- new DTIs noted to right buttock and bilat lower legs-  New skin tear to right buttock - skin tear has already been painted with cavilon and foam dressing applied- pt is already on specialty bed- will continue with q2h turns and current treatment- family is discussing withdrawal of care      Patient remains on vent and specialty turn bed  7/12 wound care note                        Wound care note 7/17 1100 am                RN note 7/17 454 am                                                                           Doctor, Please specify diagnosis or diagnoses associated with above clinical findings.         Doctor, please further clarify pressure injury to rt. Calf, present on admit status.    Provider Use       [ X ] Pressure Injury, Right Buttocks, Deep tissue injury, present on admit (POA).      [  ] Pressure Injury, Right Buttocks, Deep tissue injury,  Not  (POA).      [  ] Other please specify____________________( ) POA  (  ) Not POA,                                                                                                              [  ] Clinically undetermined

## 2018-07-19 NOTE — PHYSICIAN QUERY
PT Name: Talia Mcfarland  MR #: [id number]     Physician Query Form - Documentation Clarification      CDS/: Jolene Aiken RN CDI   Contact information: [email or web address]    This form is a permanent document in the medical record.     Query Date: July 19, 2018    By submitting this query, we are merely seeking further clarification of documentation. Please utilize your independent clinical judgment when addressing the question(s) below.    The Medical record reflects the following:    Supporting Clinical Findings Location in Medical Record   Pressure Injury 07/16/18 1900 Right posterior Calf Unstageable - one day      Discharge summary 7/18   Filiberto Risk Assessment   Sensory Perception 2-->very limited   Moisture 4-->rarely moist   Activity 1-->bedfast   Mobility 1-->completely immobile   Nutrition 2-->probably inadequate   Friction and Shear 2-->potential problem   Filiberto Score 12      Called to see pt regarding new skin breakdown- new DTIs noted to right buttock and bilat lower legs-  New skin tear to right buttock - skin tear has already been painted with cavilon and foam dressing applied- pt is already on specialty bed- will continue with q2h turns and current treatment- family is discussing withdrawal of care        Patient remains on vent and specialty turn bed    7/12 wound care note                                   Wound care note 7/17 1100 am                       RN note 7/17 454 am                                                                           Doctor, Please specify diagnosis or diagnoses associated with above clinical findings.         Doctor, please further clarify pressure injury stage to Right Calf and present on admit status.    Provider Use       [X  ] Pressure injury to right posterior calf, unstageable, present on admit (POA)    [  ] Pressure injury to right posterior calf, unstageable, Not  (POA).      [  ] Pressure injury to right posterior calf, DTI, present on admit (POA)     [  ] Pressure injury to right posterior calf, DTI, Not  (POA).      [  ] Other please specify____________________________,                                                                                                             [  ] Clinically undetermined

## 2018-07-19 NOTE — PHYSICIAN QUERY
PT Name: Talia Mcfarland  MR #: [id number]     Physician Query Form - Documentation Clarification      CDS/: Jolene Aiken RN CDI   Contact information: [email or web address]    This form is a permanent document in the medical record.     Query Date: July 19, 2018    By submitting this query, we are merely seeking further clarification of documentation. Please utilize your independent clinical judgment when addressing the question(s) below.    The Medical record reflects the following:    Supporting Clinical Findings Location in Medical Record   Pressure Injury 07/16/18 1900 Left posterior Calf Unstageable - one day      Discharge summary 7/18   Filiberto Risk Assessment   Sensory Perception 2-->very limited   Moisture 4-->rarely moist   Activity 1-->bedfast   Mobility 1-->completely immobile   Nutrition 2-->probably inadequate   Friction and Shear 2-->potential problem   Filiberto Score 12      Called to see pt regarding new skin breakdown- new DTIs noted to right buttock and bilat lower legs-  New skin tear to right buttock - skin tear has already been painted with cavilon and foam dressing applied- pt is already on specialty bed- will continue with q2h turns and current treatment- family is discussing withdrawal of care        Patient remains on vent and specialty turn bed    7/12 wound care note                                   Wound care note 7/17 1100 am                       RN note 7/17 454 am                                                                           Doctor, Please specify diagnosis or diagnoses associated with above clinical findings.         Doctor, please further clarify pressure injury to Left Calf, stage and present on admit status.    Provider Use       [ X ] Pressure injury to left posterior calf, unstageable, present on admit (POA).    [  ]  Pressure injury to left posterior calf, unstageable, Not  (POA).      [  ] Pressure injury to left posterior calf, DTI, present on admit (POA).     [  ]  Pressure injury to left posterior calf, DTI, Not  (POA).      [  ] Other please specify____________________________,                                                                                                             [  ] Clinically undetermined

## 2018-07-19 NOTE — NURSING
EMS arrived and transferred patient via bed with 2 insulin pens, 5 liters O2 nasal cannula. Discharge paperwork given, daughter notified of pick-up.

## 2018-07-19 NOTE — PHYSICIAN QUERY
PT Name: Talia Mcfarland  MR #: [id number]     Physician Query Form - Documentation Clarification      CDS/: Jolene Aiken RN CDI   Contact information: [email or web address]    This form is a permanent document in the medical record.     Query Date: July 19, 2018    By submitting this query, we are merely seeking further clarification of documentation. Please utilize your independent clinical judgment when addressing the question(s) below.    The Medical record reflects the following:    Supporting Clinical Findings Location in Medical Record   Pressure Injury 07/16/18 1900 Right posterior Calf Unstageable - one day      Discharge summary 7/18                                                                           Doctor, Please specify diagnosis or diagnoses associated with above clinical findings.         Doctor, please further clarify pressure injury to rt. Calf, present on admit status.    Provider Use       [  ] Pressure injury to right posterior calf, unstageable, present on admit (POA).      [  ]  Pressure injury to right posterior calf, unstageable, Not  (POA).      [  ] Other please specify____________________________,                                                                                                             [  ] Clinically undetermined

## 2018-07-20 LAB
BACTERIA BLD CULT: NORMAL

## 2018-07-20 NOTE — PLAN OF CARE
Patient transferred to inpatient [facility]     07/19/18 0750   Final Note   Assessment Type Final Discharge Note   Discharge Disposition Hospice  (Inpatient [facility])   Discharge plans and expectations educations in teach back method with documentation complete? Yes   Right Care Referral Info   Post Acute Recommendation Other

## 2018-07-21 LAB — BACTERIA BLD CULT: NORMAL

## 2018-07-23 NOTE — PHYSICIAN QUERY
"PT Name: Talia Mcfarland  MR #: [id number]     Physician Query Form - Documentation Clarification      CDS/: Jolene Aiken RN CDI    Contact information: [email or web address]    This form is a permanent document in the medical record.     Query Date: July 23, 2018    By submitting this query, we are merely seeking further clarification of documentation. Please utilize your independent clinical judgment when addressing the question(s) below.    The Medical record reflects the following:    Supporting Clinical Findings Location in Medical Record   Called to see pt regarding new skin breakdown- new DTIs noted to right buttock and bilat lower legs-  New skin tear to right buttock - skin tear has already been painted with cavilon and foam dressing applied- pt is already on specialty bed- will continue with q2h turns and current treatment- family is discussing withdrawal of care    1. Pressure Injury 07/16/18 1900 Right posterior Calf Unstageable - one day    2.Pressure Injury 07/16/18 1900 Left posterior Calf Unstageable - one day      3.Pressure Injury 07/17/18 0800 Right Buttocks Deep tissue injury   Wound care note 7/12                Discharge summary   Filiberto Risk Assessment   Sensory Perception 2-->very limited   Moisture 4-->rarely moist   Activity 1-->bedfast   Mobility 1-->completely immobile   Nutrition 2-->probably inadequate   Friction and Shear 2-->potential problem   Filiberto Score 12     Patient remains on vent and specialty turn bed    Wound care note 7/12                          RN note 7/17 454 am                                                                            Doctor, Please specify diagnosis or diagnoses associated with above clinical findings.      Provider do you agree with wound care documentation of "New skin breakdown."     Provider Use Only      [   ] Yes, I do agree with the diagnosis of              Pressure injury to right buttock, DTI (deep tissue injury, Not POA, and           "    Pressure injury to both posterior calves, DTI, Not POA, (present on admit).      [   ] No, I do not agree with the diagnosis of              Pressure injury to right buttock, DTI (deep tissue injury, Not POA, and              Pressure injury to both posterior calves, DTI, Not POA.      [   ] Other wounds/injuries specify type ___DTI____________             Specify locations___R Buttock and Posterior Calves             Stage____IV_____________              POA yes ( x )  POA no (  )                                                                                                                 [  ] Clinically undetermined

## 2019-01-28 NOTE — PROGRESS NOTES
Positive blood cultures from aerobic bottle resulted gram neg rods.  BERT Bowie notified and pt already on vanc and zosyn.  No new orders.  Will monitor.   [Negative] : Heme/Lymph

## 2022-01-01 NOTE — PLAN OF CARE
Health Maintenance Due   Topic Date Due   • Hepatitis B Vaccine (2 of 3 - 3-dose series) 2022       Patient is up to date, no discussion needed.   Patient is intubated at the present time and no family at the bedside. CM inable to determine an optimal d/c plan at this time. CM to f/u for safe transition     07/12/18 1030   Discharge Assessment   Assessment Type Discharge Planning Assessment   Confirmed/corrected address and phone number on facesheet? Yes   Assessment information obtained from? Patient;Medical Record   Expected Length of Stay (days) (TBD)   Prior to hospitilization cognitive status: Unable to Assess   Current cognitive status: Unable to Assess   Lives With spouse   Able to Return to Prior Arrangements unable to determine at this time (comments)   Is patient able to care for self after discharge? Unable to determine at this time (comments)   Patient's perception of discharge disposition other (comments)   Readmission Within The Last 30 Days no previous admission in last 30 days   Patient currently being followed by outpatient case management? No   Patient currently receives any other outside agency services? No   Equipment Currently Used at Home other (see comments)   Do you have any problems affording any of your prescribed medications? No   Does the patient have transportation home? Yes   Transportation Available family or friend will provide   Discharge Plan A Other   Discharge Plan B Other   Patient/Family In Agreement With Plan unable to assess

## 2023-04-28 NOTE — SUBJECTIVE & OBJECTIVE
Impression: Presence of pseudophakia: Z96.1. Plan: Discussed diagnosis. Will continue to observe. Interval History:   Review of Systems   Unable to perform ROS: Intubated       Objective:     Vital Signs (Most Recent):  Temp: 98.9 °F (37.2 °C) (07/14/18 0700)  Pulse: 94 (07/14/18 1100)  Resp: 20 (07/14/18 1100)  BP: (!) 155/59 (07/14/18 1000)  SpO2: 100 % (07/14/18 1100) Vital Signs (24h Range):  Temp:  [98 °F (36.7 °C)-98.9 °F (37.2 °C)] 98.9 °F (37.2 °C)  Pulse:  [] 94  Resp:  [15-29] 20  SpO2:  [97 %-100 %] 100 %  BP: ()/(37-96) 155/59     Weight: 52 kg (114 lb 10.2 oz)  Body mass index is 23.15 kg/m².      Intake/Output Summary (Last 24 hours) at 07/14/18 1229  Last data filed at 07/14/18 0900   Gross per 24 hour   Intake              685 ml   Output             4500 ml   Net            -3815 ml       Physical Exam   Constitutional:   Ill appearing, frail   HENT:   ETT +    Eyes:   Constricted a reactive   Neck: Neck supple.   Cardiovascular:   Tachy at 87    Pulmonary/Chest: Effort normal. No respiratory distress.   + ronchi bilaterally    Abdominal: Soft. There is no tenderness.   Musculoskeletal: She exhibits no edema.   Neurological:   Unresponsive , not moving extremities   One breath over the vent   Skin: Skin is warm.       Vents:  Vent Mode: A/C (07/14/18 1100)  Ventilator Initiated: Yes (07/11/18 7264)  Set Rate: 16 bmp (07/14/18 1100)  Vt Set: 350 mL (07/14/18 1100)  Pressure Support: 0 cmH20 (07/14/18 1100)  PEEP/CPAP: 8 cmH20 (07/14/18 1100)  Oxygen Concentration (%): 35 (07/14/18 1100)  Peak Airway Pressure: 24 cmH2O (07/14/18 1100)  Plateau Pressure: 0 cmH20 (07/14/18 1100)  Total Ve: 6.55 mL (07/14/18 1100)  F/VT Ratio<105 (RSBI): (!) 60.06 (07/14/18 1100)    Lines/Drains/Airways     Drain                 Hemodialysis AV Fistula Left upper arm -- days         NG/OG Tube 07/11/18 1811 orogastric 18 Fr. Left mouth 2 days          Airway                 Airway - Non-Surgical 07/11/18 1426 Endotracheal Tube 2 days          Pressure Ulcer                 Pressure Injury 07/12/18 Left  Heel Deep tissue injury 2 days         Pressure Injury 07/12/18 Right Heel Deep tissue injury 2 days          Peripheral Intravenous Line                 Peripheral IV - Single Lumen 07/11/18 1240 Right Forearm 2 days         Peripheral IV - Single Lumen 07/11/18 1438 Right  2 days                Significant Labs:    CBC/Anemia Profile:    Recent Labs  Lab 07/13/18  0303 07/14/18  0405   WBC 13.43* 16.31*   HGB 10.3* 10.1*   HCT 34.1* 32.7*   * 108*   MCV 80* 80*   RDW 19.8* 19.7*        Chemistries:    Recent Labs  Lab 07/13/18  0303 07/14/18  0405    140   K 4.1 4.2   CL 99 99   CO2 23 26   BUN 25* 47*   CREATININE 3.1* 3.9*   CALCIUM 9.6 10.0   ALBUMIN 2.3* 2.4*   PROT 6.2 6.5   BILITOT 1.2* 0.9   ALKPHOS 93 111   ALT 26 19   AST 27 19   MG 1.8 2.2   PHOS 3.3 3.5    Acinetobacter in the blood pansensitive  Significant Imaging:  CXR: I have reviewed all pertinent results/findings within the past 24 hours and my personal findings are:  ET tube in good position.  Improving infiltrates.